# Patient Record
Sex: FEMALE | Race: WHITE | NOT HISPANIC OR LATINO | Employment: OTHER | URBAN - METROPOLITAN AREA
[De-identification: names, ages, dates, MRNs, and addresses within clinical notes are randomized per-mention and may not be internally consistent; named-entity substitution may affect disease eponyms.]

---

## 2017-06-29 RX ORDER — LISINOPRIL 40 MG/1
40 TABLET ORAL NIGHTLY
Qty: 90 TABLET | Refills: 1 | Status: SHIPPED | OUTPATIENT
Start: 2017-06-29 | End: 2018-02-02 | Stop reason: SDUPTHER

## 2017-08-01 ENCOUNTER — OFFICE VISIT (OUTPATIENT)
Dept: CARDIOLOGY | Facility: CLINIC | Age: 82
End: 2017-08-01
Payer: MEDICARE

## 2017-08-01 ENCOUNTER — TELEPHONE (OUTPATIENT)
Dept: PHARMACY | Facility: CLINIC | Age: 82
End: 2017-08-01

## 2017-08-01 ENCOUNTER — LAB VISIT (OUTPATIENT)
Dept: LAB | Facility: HOSPITAL | Age: 82
End: 2017-08-01
Attending: INTERNAL MEDICINE
Payer: MEDICARE

## 2017-08-01 VITALS
SYSTOLIC BLOOD PRESSURE: 156 MMHG | BODY MASS INDEX: 35.9 KG/M2 | DIASTOLIC BLOOD PRESSURE: 64 MMHG | WEIGHT: 210.31 LBS | HEIGHT: 64 IN | HEART RATE: 68 BPM

## 2017-08-01 DIAGNOSIS — E78.2 MIXED HYPERLIPIDEMIA: ICD-10-CM

## 2017-08-01 DIAGNOSIS — I11.9 HYPERTENSIVE HEART DISEASE WITHOUT HEART FAILURE: ICD-10-CM

## 2017-08-01 DIAGNOSIS — I25.118 CORONARY ARTERY DISEASE OF NATIVE ARTERY OF NATIVE HEART WITH STABLE ANGINA PECTORIS: ICD-10-CM

## 2017-08-01 DIAGNOSIS — I25.118 CORONARY ARTERY DISEASE OF NATIVE ARTERY OF NATIVE HEART WITH STABLE ANGINA PECTORIS: Primary | ICD-10-CM

## 2017-08-01 LAB
ALBUMIN SERPL BCP-MCNC: 4.3 G/DL
ALP SERPL-CCNC: 48 U/L
ALT SERPL W/O P-5'-P-CCNC: 17 U/L
ANION GAP SERPL CALC-SCNC: 11 MMOL/L
AST SERPL-CCNC: 18 U/L
BILIRUB SERPL-MCNC: 0.3 MG/DL
BUN SERPL-MCNC: 20 MG/DL
CALCIUM SERPL-MCNC: 10.5 MG/DL
CHLORIDE SERPL-SCNC: 103 MMOL/L
CO2 SERPL-SCNC: 23 MMOL/L
CREAT SERPL-MCNC: 1 MG/DL
EST. GFR  (AFRICAN AMERICAN): >60 ML/MIN/1.73 M^2
EST. GFR  (NON AFRICAN AMERICAN): 52 ML/MIN/1.73 M^2
GLUCOSE SERPL-MCNC: 115 MG/DL
POTASSIUM SERPL-SCNC: 4.6 MMOL/L
PROT SERPL-MCNC: 7.2 G/DL
SODIUM SERPL-SCNC: 137 MMOL/L

## 2017-08-01 PROCEDURE — 1159F MED LIST DOCD IN RCRD: CPT | Mod: S$GLB,,, | Performed by: INTERNAL MEDICINE

## 2017-08-01 PROCEDURE — 99214 OFFICE O/P EST MOD 30 MIN: CPT | Mod: S$GLB,,, | Performed by: INTERNAL MEDICINE

## 2017-08-01 PROCEDURE — 99999 PR PBB SHADOW E&M-EST. PATIENT-LVL III: CPT | Mod: PBBFAC,,, | Performed by: INTERNAL MEDICINE

## 2017-08-01 PROCEDURE — 80053 COMPREHEN METABOLIC PANEL: CPT | Mod: PO

## 2017-08-01 PROCEDURE — 36415 COLL VENOUS BLD VENIPUNCTURE: CPT | Mod: PO

## 2017-08-01 PROCEDURE — 1126F AMNT PAIN NOTED NONE PRSNT: CPT | Mod: S$GLB,,, | Performed by: INTERNAL MEDICINE

## 2017-08-01 PROCEDURE — 99499 UNLISTED E&M SERVICE: CPT | Mod: S$GLB,,, | Performed by: INTERNAL MEDICINE

## 2017-08-01 RX ORDER — RANOLAZINE 500 MG/1
500 TABLET, EXTENDED RELEASE ORAL NIGHTLY
Qty: 60 TABLET | Refills: 3 | Status: SHIPPED | OUTPATIENT
Start: 2017-08-01 | End: 2017-08-08 | Stop reason: SDUPTHER

## 2017-08-01 RX ORDER — PRAVASTATIN SODIUM 20 MG/1
10 TABLET ORAL DAILY
COMMUNITY
End: 2018-03-19

## 2017-08-01 NOTE — PROGRESS NOTES
Subjective:    Patient ID:  Yisel العلي is a 83 y.o. female who presents for Hypertension; Coronary Artery Disease; Hyperlipidemia; and Valvular Heart Disease      HPI  Pt was followed at [facility], new to this clinic, doing ok, see ros  Past Medical History:   Diagnosis Date    Anticoagulant long-term use     Arthritis     CAD (coronary artery disease)     Cancer     skin    Cataract     CKD (chronic kidney disease), stage III     HEARING LOSS     Hematoma complicating a procedure 1213    ant abd wall    [place] (hard of hearing)     BILAT AIDS    Hyperlipidemia     Hypertension     Meniere disease     Pneumonia     Retinal detachment     not sure which eye    Vertigo     Wears glasses      Past Surgical History:   Procedure Laterality Date    carotid surgery      left endarterectomy    CATARACT EXTRACTION      ou    CORONARY STENT PLACEMENT      x1    HYSTERECTOMY      PARTIAL NEPHRECTOMY  1013    benign left kidney neoplasm    RETINAL DETACHMENT SURGERY       Family History   Problem Relation Age of Onset    Cancer Father      ?    Heart failure Father     Amblyopia Neg Hx     Blindness Neg Hx     Cataracts Neg Hx     Diabetes Neg Hx     Glaucoma Neg Hx     Hypertension Neg Hx     Macular degeneration Neg Hx     Retinal detachment Neg Hx     Strabismus Neg Hx     Stroke Neg Hx     Thyroid disease Neg Hx      Social History     Social History    Marital status:      Spouse name: N/A    Number of children: N/A    Years of education: N/A     Social History Main Topics    Smoking status: Former Smoker    Smokeless tobacco: Never Used    Alcohol use Yes      Comment: occasional    Drug use: No    Sexual activity: Not Asked     Other Topics Concern    None     Social History Narrative    None       Review of patient's allergies indicates:   Allergen Reactions    Codeine Other (See Comments)     dosent remember reaction;maybe nausea       Current Outpatient Prescriptions:     " aspirin (ECOTRIN) 81 MG EC tablet, Take 81 mg by mouth once daily., Disp: , Rfl:     diazePAM (VALIUM) 5 MG tablet, TAKE 1 TABLET BY MOUTH EACH DAY AS NEEDED FOR ANXIETY (MAY CAUSE DROWSINESS!) "THANK YOU", Disp: 30 tablet, Rfl: 1    lisinopril (PRINIVIL,ZESTRIL) 40 MG tablet, Take 1 tablet (40 mg total) by mouth nightly., Disp: 90 tablet, Rfl: 1    nitroGLYCERIN (NITRODUR) 0.1 mg/hr, 1 patch once daily. , Disp: , Rfl:     pravastatin (PRAVACHOL) 20 MG tablet, Take 10 mg by mouth once daily., Disp: , Rfl:     ranolazine (RANEXA) 500 MG Tb12, Take 1 tablet (500 mg total) by mouth nightly., Disp: 60 tablet, Rfl: 3    Review of Systems   Constitution: Negative for chills, diaphoresis, weakness, malaise/fatigue and night sweats.   HENT: Negative for congestion, hearing loss and nosebleeds.    Eyes: Negative for blurred vision, discharge, double vision and visual disturbance.   Cardiovascular: Negative for chest pain (rare angina), claudication, cyanosis, dyspnea on exertion (minimal), irregular heartbeat, leg swelling, near-syncope, orthopnea, palpitations, paroxysmal nocturnal dyspnea and syncope.   Respiratory: Negative for cough, hemoptysis, shortness of breath, sleep disturbances due to breathing, sputum production and wheezing.    Endocrine: Negative for cold intolerance, heat intolerance and polyuria.   Hematologic/Lymphatic: Negative for adenopathy. Does not bruise/bleed easily.   Skin: Negative for color change, itching and nail changes.   Musculoskeletal: Positive for arthritis and back pain. Negative for falls. Muscle cramps: some.   Gastrointestinal: Negative for bloating, abdominal pain, change in bowel habit, dysphagia, heartburn, hematemesis, jaundice, melena and vomiting.   Genitourinary: Negative for dysuria, flank pain and frequency.   Neurological: Negative for brief paralysis, difficulty with concentration, disturbances in coordination, dizziness, focal weakness, light-headedness, loss of " "balance, numbness, paresthesias, seizures, sensory change and tremors.   Psychiatric/Behavioral: Negative for altered mental status, depression, memory loss and substance abuse. The patient is not nervous/anxious.    Allergic/Immunologic: Negative for persistent infections.        Objective:      Vitals:    08/01/17 1309   BP: (!) 156/64   Pulse: 68   Weight: 95.4 kg (210 lb 5.1 oz)   Height: 5' 4" (1.626 m)   PainSc: 0-No pain     Body mass index is 36.1 kg/m².    Physical Exam   Constitutional: She is oriented to person, place, and time. She appears well-developed and well-nourished. She is active.   obese   HENT:   Head: Normocephalic and atraumatic.   Mouth/Throat: Oropharynx is clear and moist and mucous membranes are normal.   Eyes: Conjunctivae and EOM are normal. Pupils are equal, round, and reactive to light.   Neck: Trachea normal and normal range of motion. Neck supple. Normal carotid pulses, no hepatojugular reflux and no JVD present. Carotid bruit is not present. No tracheal deviation, no edema and no erythema present. No thyromegaly present.   Cardiovascular: Normal rate and regular rhythm.   No extrasystoles are present. PMI is not displaced.  Exam reveals no gallop and no friction rub.    Murmur heard.   Systolic murmur is present with a grade of 1/6  at the lower left sternal border  Pulses:       Carotid pulses are 2+ on the right side, and 2+ on the left side.       Radial pulses are 2+ on the right side, and 2+ on the left side.        Femoral pulses are 2+ on the right side, and 2+ on the left side.       Posterior tibial pulses are 2+ on the right side, and 2+ on the left side.   Pulmonary/Chest: Effort normal and breath sounds normal. No tachypnea and no bradypnea. She has no wheezes. She has no rales. She exhibits no tenderness.   Abdominal: Soft. Bowel sounds are normal. She exhibits no distension and no mass. There is no hepatosplenomegaly. There is no tenderness. There is no guarding and " no CVA tenderness.   Musculoskeletal: Normal range of motion. She exhibits no edema or tenderness.   Lymphadenopathy:     She has no cervical adenopathy.   Neurological: She is alert and oriented to person, place, and time. She has normal strength. She displays no tremor. No cranial nerve deficit. She exhibits normal muscle tone. Coordination normal.   Skin: Skin is warm and dry. No rash noted. No cyanosis or erythema. No pallor.   Psychiatric: She has a normal mood and affect. Her speech is normal and behavior is normal. Judgment and thought content normal.               ..    Chemistry        Component Value Date/Time     08/01/2017 1404    K 4.6 08/01/2017 1404     08/01/2017 1404    CO2 23 08/01/2017 1404    BUN 20 08/01/2017 1404    CREATININE 1.0 08/01/2017 1404     (H) 08/01/2017 1404        Component Value Date/Time    CALCIUM 10.5 08/01/2017 1404    ALKPHOS 48 (L) 08/01/2017 1404    AST 18 08/01/2017 1404    ALT 17 08/01/2017 1404    BILITOT 0.3 08/01/2017 1404    ESTGFRAFRICA >60 08/01/2017 1404    EGFRNONAA 52 (A) 08/01/2017 1404            ..  Lab Results   Component Value Date    CHOL 221 (H) 01/23/2015     Lab Results   Component Value Date    HDL 41 (L) 01/23/2015     Lab Results   Component Value Date    LDLCALC 108 01/23/2015     Lab Results   Component Value Date    TRIG 361 (H) 01/23/2015     Lab Results   Component Value Date    CHOLHDL 5.4 (H) 01/23/2015     ..  Lab Results   Component Value Date    WBC 6.60 07/27/2016    HGB 12.8 07/27/2016    HCT 37.4 07/27/2016    MCV 93 07/27/2016     (H) 07/27/2016       Test(s) Reviewed  I have reviewed the following in detail:  [] Stress test   [] Angiography   [] Echocardiogram   [] Labs   [x] Other:       Assessment:         ICD-10-CM ICD-9-CM   1. Coronary artery disease of native artery of native heart with stable angina pectoris I25.118 414.01     413.9   2. Hypertensive heart disease without heart failure I11.9 402.90   3.  Mixed hyperlipidemia E78.2 272.2     Problem List Items Addressed This Visit        Cardiac    Coronary artery disease of native artery of native heart with stable angina pectoris - Primary    Relevant Orders    Comprehensive metabolic panel (Completed)    Comprehensive metabolic panel    Hypertensive heart disease without heart failure    Relevant Orders    Comprehensive metabolic panel (Completed)    Comprehensive metabolic panel       Fluids/Electrolytes/Nutrition/GI    Mixed hyperlipidemia    Relevant Orders    Comprehensive metabolic panel (Completed)    Lipid panel    Comprehensive metabolic panel      Other Visit Diagnoses    None.          Plan:     compliance with meds, refill medsd, c/o cost, daily PRAVACHOL PLUS CO-Q-10, LABS TODAY,ALL CV CLINICALLY STABLE, CLASS 1  ANGINA, NO HF, NO TIA, NO CLINICAL ARRHYTHMIA,CONTINUE CURRENT MEDS, EDUCATION, DIET, EXERCISE, WEIGHT LOSS,  RTC IN 6 MO WITH LABS      Coronary artery disease of native artery of native heart with stable angina pectoris  -     Comprehensive metabolic panel; Future; Expected date: 08/01/2017  -     Comprehensive metabolic panel; Future; Expected date: 08/01/2017    Hypertensive heart disease without heart failure  -     Comprehensive metabolic panel; Future; Expected date: 08/01/2017  -     Comprehensive metabolic panel; Future; Expected date: 08/01/2017    Mixed hyperlipidemia  -     Comprehensive metabolic panel; Future; Expected date: 08/01/2017  -     Lipid panel; Future; Expected date: 08/01/2017  -     Comprehensive metabolic panel; Future; Expected date: 08/01/2017    Other orders  -     ranolazine (RANEXA) 500 MG Tb12; Take 1 tablet (500 mg total) by mouth nightly.  Dispense: 60 tablet; Refill: 3    RTC Low level/low impact aerobic exercise 5x's/wk. Heart healthy diet and risk factor modification.    See labs and med orders.    Aerobic exercise 5x's/wk. Heart healthy diet and risk factor modification.    See labs and med  orders.

## 2017-08-01 NOTE — PATIENT INSTRUCTIONS
What Is Angina?  Angina is a warning that your heart muscle is not getting enough oxygen-rich blood and is at risk for damage. Medicines, certain medical procedures, and lifestyle changes can help control angina. Talk with your healthcare provider about how to prevent angina and what to do if you get it.    How does angina feel?  Angina is often described as chest pain, but this can be misleading. Angina is not always painful, and it isnt always felt in the chest. Angina might feel like:  · Discomfort, aching, tightness, or pressure that comes and goes. You may feel this in your chest, back, abdomen, arm, shoulder, neck, or jaw.  · Tiredness that gets worse or you have more tiredness than usual for no clear reason  · Shortness of breath while doing something that used to be easy  · Heartburn, indigestion, nausea, or sweating  Call 911 right away if any of your symptoms lasts for more than a few minutes. Or if they go away and come back. Or if they happen at rest and don't go away after taking nitroglycerin. Or if they continue to get worse. You could be having a heart attack (acute myocardial infarction).  When does angina happen?  · Angina usually happens during activity. It can also occur when youre upset or after a large meal. Sometimes angina can happen when the weather is too hot or too cold. All of these things can put more stress on your body and your heart.  · You may have unstable angina if angina starts occurring more often, lasts longer, happens even when you are resting, or causes more discomfort. Its a sign that your heart problem may be getting worse. You need to call your healthcare provider right away.  Date Last Reviewed: 10/1/2016  © 3932-9242 [employer]. [address] [place] [address], [place] [address]. All rights reserved. This information is not intended as a substitute for professional medical care. Always follow your healthcare professional's instructions.        Controlling Your  "Cholesterol  Cholesterol is a waxy substance. It travels in your blood through the blood vessels. When you have high cholesterol, it builds up in the walls of the blood vessels. This makes the vessels narrower. Blood flow decreases. You are then at greater risk for having a heart attack or a stroke.  Good and bad cholesterol  Lipids are fats. Blood is mostly water. Fat and water don't mix. So our bodies need lipoproteins (lipids inside a protein shell) to carry the lipids. The protein shell carries its lipids through the bloodstream. There are two main kinds of lipoproteins:  · LDL (low-density lipoprotein) is known as "bad cholesterol." It mainly carries cholesterol. It delivers this cholesterol to body cells. Excess LDL cholesterol will build up in artery walls. This increases your risk for heart disease and stroke.  · HDL (high-density lipoprotein) is known as "good cholesterol." This protein shell collects excess cholesterol that LDLs have left behind on blood vessel walls. That's why high levels of HDL cholesterol can decrease your risk of heart disease and stroke.  Controlling cholesterol levels  Total cholesterol includes LDL and HDL cholesterol, as well as other fats in the bloodstream. If your total cholesterol is high, follow the steps below to help lower your total cholesterol level:  · Eat less unhealthy fat:  ¨ Cut back on saturated fats and trans (also called hydrogenated) fats by selecting lean cuts of meat, low-fat dairy, and using oils instead of solid fats. Limit baked goods, processed meats, and fried foods. A diet thats high in these fats increases your bad cholesterol. It's not enough to just cut back on foods containing cholesterol.  ¨ Eat about 2 servings of fish per week. Most fish contain omega-3 fatty acids. These help lower blood cholesterol.  ¨ Eat more whole grains and soluble fiber (such as oat bran). These lower overall cholesterol.  · Be active:  ¨ Choose an activity you enjoy. " Walking, swimming, and riding a bike are some good ways to be active.  ¨ Start at a level where you feel comfortable. Increase your time and pace a little each week.  ¨ Work up to 40 minutes of moderate to high intensity physical activity at least 3 to 4 days per week.  ¨ Remember, some activity is better than none.  ¨ If you haven't been exercising regularly, start slowly. Check with your doctor to make sure the exercise plan is right for you.  · Quit smoking. Quitting smoking can improve your lipid levels. It also lowers your risk for heart disease and stroke.  · Weight management. If you are overweight or obese, your health care provider will work with you to lose weight and lower your BMI (body mass index) to a normal or near-normal level. Making diet changes and increasing physical activity can help.  · Take medication as directed. Many people need medication to get their LDL levels to a safe level. Medication to lower cholesterol levels is effective and safe. (But taking medication is not a substitute for exercise or watching your diet!) Your doctor can tell you whether you might benefit from a cholesterol-lowering medication.  Date Last Reviewed: 5/11/2015  © 6968-8229 [employer]. [address] [place] [address], [place] [id number]. All rights reserved. This information is not intended as a substitute for professional medical care. Always follow your healthcare professional's instructions.        Established High Blood Pressure    High blood pressure (hypertension) is a chronic disease. Often health care providers dont know what causes it. But it can be caused by certain health conditions and medicines.  If you have high blood pressure, you may not have any symptoms. If you do have symptoms, they may include headache, dizziness, changes in your vision, chest pain, and shortness of breath. But even without symptoms, high blood pressure thats not treated raises your risk for heart attack and stroke. High  blood pressure is a serious health risk and shouldnt be ignored.  A blood pressure reading is made up of two numbers: a higher number over a lower number. The top number is the systolic pressure. The bottom number is the diastolic pressure. A normal blood pressure is less than 120 over less than 80.  High blood pressure is when either the top number is 140 or higher, or the bottom number is 90 or higher. This must be the result when taking your blood pressure a number of times. The blood pressures between normal and high are called prehypertension.  Home care  If you have high blood pressure, you should do what is listed below to lower your blood pressure. If you are taking medicines for high blood pressure, these methods may reduce or end your need for medicines in the future.  · Begin a weight-loss program if you are overweight.  · Cut back on how much salt you get in your diet. Heres how to do this:  ¨ Dont eat foods that have a lot of salt. These include olives, pickles, smoked meats, and salted potato chips.  ¨ Dont add salt to your food at the table.  ¨ Use only small amounts of salt when cooking.  · Begin an exercise program. Talk with your health care provider about the type of exercise program that would be best for you. It doesn't have to be hard. Even brisk walking for 20 minutes 3 times a week is a good form of exercise.  · Dont take medicines that have heart stimulants. This includes many cold and sinus decongestant pills and sprays, as well as diet pills. Check the warnings about hypertension on the label. Stimulants such as amphetamine or cocaine could be lethal for someone with high blood pressure. Never take these.  · Limit how much caffeine you get in your diet. Switch to caffeine-free products.  · Stop smoking. If you are a long-time smoker, this can be hard. Enroll in a stop-smoking program to make it more likely that you will quit for good.  · Learn how to handle stress. This is an  important part of any program to lower blood pressure. Learn about relaxation methods like meditation, yoga, or biofeedback.  · If your provider prescribed medicines, take them exactly as directed. Missing doses may cause your blood pressure get out of control.  · Consider buying an automatic blood pressure machine. You can get one of these at most pharmacies. Use this to watch your blood pressure at home. Give the results to your provider.  Follow-up care  You will need to make regular visits to your health care provider. This is to check your blood pressure and to make changes to your medicines. Make a follow-up appointment as directed.  When to seek medical advice  Call your health care provider right away if any of these occur:  · Chest pain or shortness of breath  · Severe headache  · Throbbing or rushing sound in the ears  · Nosebleed  · Sudden severe pain in your belly (abdomen)  · Extreme drowsiness, confusion, or fainting  · Dizziness or dizziness with a spinning sensation (vertigo)  · Weakness of an arm or leg or one side of the face  · You have problems speaking or seeing   Date Last Reviewed: 11/25/2014  © 4795-4807 [employer]. [address] [place] [address]. All rights reserved. This information is not intended as a substitute for professional medical care. Always follow your healthcare professional's instructions.        Weight Management: Getting Started  Healthy bodies come in all shapes and sizes. Not all bodies are made to be thin. For some people, a healthy weight is higher than the average weight listed on weight charts. Your healthcare provider can help you decide on a healthy weight for you.    Reasons to lose weight  Losing weight can help with some health problems, such as high blood pressure, heart disease, diabetes, sleep apnea, and arthritis. You may also feel more energy.  Set your long-term goal  Your goal doesn't even have to be a specific weight. You may  decide on a fitness goal (such as being able to walk 10 miles a week), or a health goal (such as lowering your blood pressure). Choose a goal that is measurable and reasonable, so you know when you've reached it. A goal of reaching a BMI of less than 25 is not always reasonable (or possible).   Make an action plan  Habits dont change overnight. Setting your goals too high can leave you feeling discouraged if you cant reach them. Be realistic. Choose one or two small changes you can make now. Set an action plan for how you are going to make these changes. When you can stick to this plan, keep making a few more small changes. Taking small steps will help you stay on the path to success.  Track your progress  Write down your goals. Then, keep a daily record of your progress. Write down what you eat and how active you are. This record lets you look back on how much youve done. It may also help when youre feeling frustrated. Reward yourself for success. Even if you dont reach every goal, give yourself credit for what you do get done.  Get support  Encouragement from others can help make losing weight easier. Ask your family members and friends for support. They may even want to join you. Also look to your healthcare provider, registered dietitian, and  for help. Your local hospital can give you more information about nutrition, exercise, and weight loss.  Date Last Reviewed: 1/31/2016  © 5603-7395 [employer]. [address] [place] [address], [place] [address]. All rights reserved. This information is not intended as a substitute for professional medical care. Always follow your healthcare professional's instructions.

## 2017-08-02 NOTE — PROGRESS NOTES
Patient, Yisel العلي (MRN #[id number]), presented with a recorded BMI of 36.1 kg/m^2 and a documented comorbidity(s):  - Hypertension  - Hyperlipidemia  to which the severe obesity is a contributing factor. This is consistent with the definition of severe obesity (BMI 35.0-35.9) with comorbidity (ICD-10 E66.01, Z68.35). The patient's severe obesity was monitored, evaluated, addressed and/or treated. This addendum to the medical record is made on 08/02/2017.

## 2017-08-08 RX ORDER — RANOLAZINE 500 MG/1
500 TABLET, EXTENDED RELEASE ORAL NIGHTLY
Qty: 60 TABLET | Refills: 3 | Status: SHIPPED | OUTPATIENT
Start: 2017-08-08 | End: 2018-11-05

## 2017-08-08 NOTE — TELEPHONE ENCOUNTER
----- Message from Cecily Ladd sent at 8/8/2017  3:38 PM CDT -----  Contact: [phone]  Patient is requesting a call back from the nurse stated she's requesting a generic prescription renaxa.    Please call the patient upon request at phone number [phone].

## 2017-10-24 ENCOUNTER — PATIENT MESSAGE (OUTPATIENT)
Dept: CARDIOLOGY | Facility: CLINIC | Age: 82
End: 2017-10-24

## 2017-10-24 ENCOUNTER — TELEPHONE (OUTPATIENT)
Dept: CARDIOLOGY | Facility: CLINIC | Age: 82
End: 2017-10-24

## 2017-10-24 NOTE — TELEPHONE ENCOUNTER
----- Message from Flora Willett sent at 10/24/2017  4:11 PM CDT -----  Contact: Patient  Patient states that she would like to talk to you and can you please call her back at [phone].  Thank you

## 2017-10-25 ENCOUNTER — TELEPHONE (OUTPATIENT)
Dept: CARDIOLOGY | Facility: CLINIC | Age: 82
End: 2017-10-25

## 2017-10-25 ENCOUNTER — OFFICE VISIT (OUTPATIENT)
Dept: FAMILY MEDICINE | Facility: CLINIC | Age: 82
End: 2017-10-25
Payer: MEDICARE

## 2017-10-25 VITALS
HEIGHT: 64 IN | TEMPERATURE: 97 F | WEIGHT: 202.38 LBS | OXYGEN SATURATION: 96 % | SYSTOLIC BLOOD PRESSURE: 130 MMHG | BODY MASS INDEX: 34.55 KG/M2 | DIASTOLIC BLOOD PRESSURE: 74 MMHG | HEART RATE: 72 BPM | RESPIRATION RATE: 16 BRPM

## 2017-10-25 DIAGNOSIS — F41.9 ANXIETY: ICD-10-CM

## 2017-10-25 DIAGNOSIS — F43.9 STRESS: ICD-10-CM

## 2017-10-25 DIAGNOSIS — J30.9 ALLERGIC RHINITIS, UNSPECIFIED CHRONICITY, UNSPECIFIED SEASONALITY, UNSPECIFIED TRIGGER: Primary | ICD-10-CM

## 2017-10-25 PROCEDURE — 99999 PR PBB SHADOW E&M-EST. PATIENT-LVL III: CPT | Mod: PBBFAC,,, | Performed by: INTERNAL MEDICINE

## 2017-10-25 PROCEDURE — 99214 OFFICE O/P EST MOD 30 MIN: CPT | Mod: S$GLB,,, | Performed by: INTERNAL MEDICINE

## 2017-10-25 RX ORDER — LORAZEPAM 0.5 MG/1
TABLET ORAL
Qty: 30 TABLET | Refills: 0 | Status: SHIPPED | OUTPATIENT
Start: 2017-10-25 | End: 2018-03-19

## 2017-10-25 RX ORDER — TRIAMCINOLONE ACETONIDE 55 UG/1
2 SPRAY, METERED NASAL DAILY PRN
Qty: 16.9 G | Refills: 0 | Status: SHIPPED | OUTPATIENT
Start: 2017-10-25 | End: 2018-03-19

## 2017-10-25 RX ORDER — TRIAMCINOLONE ACETONIDE 55 UG/1
2 SPRAY, METERED NASAL DAILY PRN
Qty: 16.9 G | Refills: 0 | Status: SHIPPED | OUTPATIENT
Start: 2017-10-25 | End: 2017-10-25 | Stop reason: SDUPTHER

## 2017-10-25 RX ORDER — LORAZEPAM 0.5 MG/1
TABLET ORAL
Qty: 30 TABLET | Refills: 0 | Status: SHIPPED | OUTPATIENT
Start: 2017-10-25 | End: 2017-10-25 | Stop reason: SDUPTHER

## 2017-10-25 NOTE — TELEPHONE ENCOUNTER
----- Message from Cristobal MAJANO Lisseth sent at 10/25/2017 12:48 PM CDT -----  Contact: same  Patient called in and stated her  passed away and would like to see if Dr. Celeste could call her in a Rx for her anxiety that she is having.  ( Valium 5 mg) Patient would like a call back number is [phone] cell.      For today only : [facility] [phone] phone     Patient is going back home tomorrow and would need Rx called in to the below:      [id number] [place] - [address]  [address] [place] [address]  Phone: [phone] Fax: [phone]

## 2017-10-25 NOTE — TELEPHONE ENCOUNTER
----- Message from Ellen Garrett sent at 10/25/2017  8:49 AM CDT -----  Contact: [phone]  Patient requesting to speak with nurse (only information given)please call patient back at [phone].

## 2017-10-25 NOTE — PROGRESS NOTES
Subjective:       Patient ID: Yisel العلي is a 83 y.o. female.    Chief Complaint: Establish Care; Anxiety; and Insomnia  -est care---------here with daughter------------who she has been staying with in ------lives in [facility].       passed 2 weeks ago and she is having stress/anxiety,/insomnia.          ---------  HPI  Review of Systems   Constitutional: Negative for activity change, appetite change, chills, diaphoresis, fatigue, fever and unexpected weight change.   HENT: Positive for postnasal drip. Negative for congestion, drooling, ear discharge, ear pain, facial swelling, hearing loss, mouth sores, nosebleeds, rhinorrhea, sinus pressure, sneezing, sore throat, tinnitus, trouble swallowing and voice change.    Eyes: Negative for photophobia and redness.   Respiratory: Negative for apnea, cough, choking, chest tightness, shortness of breath and wheezing.    Cardiovascular: Negative for chest pain, palpitations and leg swelling.   Gastrointestinal: Negative for abdominal distention, abdominal pain, blood in stool, constipation, diarrhea, nausea and vomiting.   Endocrine: Negative for cold intolerance, heat intolerance, polydipsia, polyphagia and polyuria.   Genitourinary: Negative for decreased urine volume, difficulty urinating, dysuria, flank pain, frequency, genital sores, hematuria and urgency.   Musculoskeletal: Negative for arthralgias, back pain, gait problem, joint swelling, myalgias, neck pain and neck stiffness.   Skin: Negative for color change, pallor, rash and wound.   Allergic/Immunologic: Negative for food allergies and immunocompromised state.   Neurological: Negative for dizziness, tremors, seizures, syncope, speech difficulty, weakness, light-headedness, numbness and headaches.   Hematological: Negative for adenopathy. Does not bruise/bleed easily.   Psychiatric/Behavioral: Positive for sleep disturbance. Negative for agitation, behavioral problems, confusion, decreased concentration,  dysphoric mood, hallucinations, self-injury and suicidal ideas. The patient is nervous/anxious. The patient is not hyperactive.    All other systems reviewed and are negative.      Objective:      Physical Exam   Constitutional: She is oriented to person, place, and time. She appears well-developed and well-nourished. No distress.   HENT:   Head: Normocephalic and atraumatic.   Neck: Normal range of motion. Neck supple. Carotid bruit is not present.   Cardiovascular: Normal rate, regular rhythm, normal heart sounds and intact distal pulses.    Pulmonary/Chest: Effort normal and breath sounds normal. No respiratory distress. She has no wheezes. She has no rales. She exhibits no tenderness.   Abdominal: Soft. Bowel sounds are normal. She exhibits no distension. There is no tenderness. There is no rebound and no guarding.   Musculoskeletal: Normal range of motion. She exhibits no edema or tenderness.   Neurological: She is alert and oriented to person, place, and time. Coordination normal.   Skin: Skin is warm and dry. No rash noted. She is not diaphoretic. No erythema. No pallor.   Psychiatric: She has a normal mood and affect. Her behavior is normal. Judgment and thought content normal.   Nursing note and vitals reviewed.      Assessment:       1. Allergic rhinitis, unspecified chronicity, unspecified seasonality, unspecified trigger    2. Anxiety    3. Stress        Plan:         notes/labs reviewed.          Continue meds.              Trial ativan 0.5 mg--1/2 to 1 bid prn.        Call for problems.

## 2017-10-26 ENCOUNTER — TELEPHONE (OUTPATIENT)
Dept: CARDIOLOGY | Facility: CLINIC | Age: 82
End: 2017-10-26

## 2017-10-26 NOTE — TELEPHONE ENCOUNTER
30 day supply of Valium 5mg PO daily PRN anxiety, called into [facility] in [place], pt verbalizes understanding

## 2017-12-04 ENCOUNTER — TELEPHONE (OUTPATIENT)
Dept: CARDIOLOGY | Facility: CLINIC | Age: 82
End: 2017-12-04

## 2017-12-04 NOTE — TELEPHONE ENCOUNTER
----- Message from Mayte Sanchez sent at 12/4/2017 10:22 AM CST -----  Contact: self  Patient called regarding her lab orders would like mailed to her before her appt in January. Please contact [phone] (home)

## 2018-01-18 ENCOUNTER — TELEPHONE (OUTPATIENT)
Dept: CARDIOLOGY | Facility: CLINIC | Age: 83
End: 2018-01-18

## 2018-01-18 NOTE — TELEPHONE ENCOUNTER
----- Message from Domonique Liu sent at 1/16/2018  4:01 PM CST -----  Contact: patient  Patient calling to request lab work orders to be mailed to her and she will have them done before her appt. Please advise.  Call back   Thanks!

## 2018-02-02 RX ORDER — LISINOPRIL 40 MG/1
40 TABLET ORAL NIGHTLY
Qty: 90 TABLET | Refills: 1 | Status: SHIPPED | OUTPATIENT
Start: 2018-02-02 | End: 2018-03-19 | Stop reason: SDUPTHER

## 2018-02-02 NOTE — TELEPHONE ENCOUNTER
----- Message from Adwoa Argueta sent at 2/2/2018 10:37 AM CST -----  Contact: self  Patient [phone]  has been trying to reach Dr Celeste's office for a refill on her blood pressure medication but has not been able to reach anyone for two weeks/she has been out of her Lisinopril 40mg for two weeks and her blood pressure is running high - per patient/please call prescription to [facility] [phone] as soon as possible today and advise patient when sent to pharmacy/if patient is not able to answer please leave her a voicemail

## 2018-02-12 ENCOUNTER — PATIENT MESSAGE (OUTPATIENT)
Dept: CARDIOLOGY | Facility: CLINIC | Age: 83
End: 2018-02-12

## 2018-02-19 ENCOUNTER — TELEPHONE (OUTPATIENT)
Dept: CARDIOLOGY | Facility: CLINIC | Age: 83
End: 2018-02-19

## 2018-02-19 NOTE — TELEPHONE ENCOUNTER
----- Message from Mayte Sanchez sent at 2/19/2018  4:27 PM CST -----  Contact: self  Patient called regarding orders needed for labs before her appt. Please mail to her home. Please contact [phone] (home)

## 2018-03-19 ENCOUNTER — OFFICE VISIT (OUTPATIENT)
Dept: CARDIOLOGY | Facility: CLINIC | Age: 83
End: 2018-03-19
Payer: MEDICARE

## 2018-03-19 VITALS — BODY MASS INDEX: 32.85 KG/M2 | WEIGHT: 192.44 LBS | HEIGHT: 64 IN

## 2018-03-19 DIAGNOSIS — E87.1 HYPONATREMIA: ICD-10-CM

## 2018-03-19 DIAGNOSIS — E78.2 MIXED HYPERLIPIDEMIA: ICD-10-CM

## 2018-03-19 DIAGNOSIS — I10 HTN (HYPERTENSION), BENIGN: ICD-10-CM

## 2018-03-19 DIAGNOSIS — E66.9 OBESITY, CLASS I, BMI 30-34.9: ICD-10-CM

## 2018-03-19 DIAGNOSIS — I25.118 CORONARY ARTERY DISEASE OF NATIVE ARTERY OF NATIVE HEART WITH STABLE ANGINA PECTORIS: Primary | ICD-10-CM

## 2018-03-19 PROBLEM — E66.811 OBESITY, CLASS I, BMI 30-34.9: Status: ACTIVE | Noted: 2018-03-19

## 2018-03-19 PROCEDURE — 99214 OFFICE O/P EST MOD 30 MIN: CPT | Mod: S$GLB,,, | Performed by: INTERNAL MEDICINE

## 2018-03-19 PROCEDURE — 99999 PR PBB SHADOW E&M-EST. PATIENT-LVL II: CPT | Mod: PBBFAC,,, | Performed by: INTERNAL MEDICINE

## 2018-03-19 RX ORDER — LISINOPRIL 40 MG/1
40 TABLET ORAL NIGHTLY
Qty: 90 TABLET | Refills: 1 | Status: SHIPPED | OUTPATIENT
Start: 2018-03-19 | End: 2018-07-21 | Stop reason: SDUPTHER

## 2018-03-19 RX ORDER — PRAVASTATIN SODIUM 40 MG/1
40 TABLET ORAL DAILY
Qty: 90 TABLET | Refills: 1 | Status: SHIPPED | OUTPATIENT
Start: 2018-03-19 | End: 2018-11-07 | Stop reason: SDUPTHER

## 2018-03-19 NOTE — PROGRESS NOTES
Subjective:    Patient ID:  Yisel العلي is a 84 y.o. female who presents for Coronary Artery Disease (Labs); Hypertension; and Hyperlipidemia        HPI  DISCUSSED LABS AND GOALS, , NOT TAKING MEDS, , CR 1.06, NO CP OR OCC / SOB, NOT ACTIVE, SEE ROS    Past Medical History:   Diagnosis Date    Anticoagulant long-term use     Arthritis     CAD (coronary artery disease)     Cancer     skin    Cataract     CKD (chronic kidney disease), stage III     HEARING LOSS     Hematoma complicating a procedure 1213    ant abd wall    [place] (hard of hearing)     BILAT AIDS    Hyperlipidemia     Hypertension     Meniere disease     Pneumonia     Retinal detachment     not sure which eye    Vertigo     Wears glasses      Past Surgical History:   Procedure Laterality Date    carotid surgery      left endarterectomy    CATARACT EXTRACTION      ou    CORONARY STENT PLACEMENT      x1    HYSTERECTOMY      PARTIAL NEPHRECTOMY  1013    benign left kidney neoplasm    RETINAL DETACHMENT SURGERY       Family History   Problem Relation Age of Onset    Cancer Father      ?    Heart failure Father     Amblyopia Neg Hx     Blindness Neg Hx     Cataracts Neg Hx     Diabetes Neg Hx     Glaucoma Neg Hx     Hypertension Neg Hx     Macular degeneration Neg Hx     Retinal detachment Neg Hx     Strabismus Neg Hx     Stroke Neg Hx     Thyroid disease Neg Hx      Social History     Social History    Marital status:      Spouse name: N/A    Number of children: N/A    Years of education: N/A     Social History Main Topics    Smoking status: Former Smoker    Smokeless tobacco: Never Used    Alcohol use Yes      Comment: occasional    Drug use: No    Sexual activity: Not on file     Other Topics Concern    Not on file     Social History Narrative    No narrative on file       Review of patient's allergies indicates:   Allergen Reactions    Codeine Other (See Comments)     dosent remember  reaction;maybe nausea       Current Outpatient Prescriptions:     aspirin (ECOTRIN) 81 MG EC tablet, Take 81 mg by mouth once daily., Disp: , Rfl:     FLUZONE HIGH-DOSE 2017-18, PF, 180 mcg/0.5 mL vaccine, ADM 0.5ML IM UTD, Disp: , Rfl: 0    lisinopril (PRINIVIL,ZESTRIL) 40 MG tablet, Take 1 tablet (40 mg total) by mouth nightly., Disp: 90 tablet, Rfl: 1    nitroGLYCERIN (NITRODUR) 0.1 mg/hr, 1 patch once daily. , Disp: , Rfl:     ranolazine (RANEXA) 500 MG Tb12, Take 1 tablet (500 mg total) by mouth nightly., Disp: 60 tablet, Rfl: 3    pravastatin (PRAVACHOL) 40 MG tablet, Take 1 tablet (40 mg total) by mouth once daily., Disp: 90 tablet, Rfl: 1    Review of Systems   Constitution: Negative for chills, diaphoresis, weakness, malaise/fatigue and night sweats.   HENT: Negative for congestion, hearing loss and nosebleeds.    Eyes: Negative for blurred vision, discharge, double vision and visual disturbance.   Cardiovascular: Negative for chest pain (OCC ANGINA, NITRATES), claudication, cyanosis, dyspnea on exertion (minimal), irregular heartbeat, leg swelling, near-syncope, orthopnea, palpitations, paroxysmal nocturnal dyspnea and syncope.   Respiratory: Negative for cough, hemoptysis, shortness of breath and wheezing.    Endocrine: Negative for cold intolerance, heat intolerance and polyuria.   Hematologic/Lymphatic: Negative for adenopathy. Does not bruise/bleed easily.   Skin: Negative for color change, itching and rash.   Musculoskeletal: Negative for back pain (CHRONIC, MILD) and falls. Muscle cramps: some.   Gastrointestinal: Negative for bloating, abdominal pain, change in bowel habit, dysphagia, heartburn, hematemesis, jaundice, melena and vomiting.   Genitourinary: Negative for dysuria, flank pain and frequency.   Neurological: Negative for brief paralysis, dizziness, focal weakness, light-headedness, loss of balance, numbness, paresthesias, seizures, sensory change and tremors.  "  Psychiatric/Behavioral: Negative for altered mental status, depression and memory loss. The patient is not nervous/anxious.    Allergic/Immunologic: Negative for hives and persistent infections.        Objective:      Vitals:    03/19/18 1333   Weight: 87.3 kg (192 lb 7.4 oz)   Height: 5' 4" (1.626 m)     Body mass index is 33.04 kg/m².    Physical Exam   Constitutional: She is oriented to person, place, and time. She appears well-developed and well-nourished. She is active.   obese   HENT:   Head: Normocephalic and atraumatic.   Mouth/Throat: Oropharynx is clear and moist and mucous membranes are normal.   Eyes: Conjunctivae and EOM are normal. Pupils are equal, round, and reactive to light.   Neck: Trachea normal and normal range of motion. Neck supple. Normal carotid pulses, no hepatojugular reflux and no JVD present. Carotid bruit is not present. No tracheal deviation, no edema and no erythema present. No thyromegaly present.   Cardiovascular: Normal rate and regular rhythm.   No extrasystoles are present. PMI is not displaced.  Exam reveals no gallop and no friction rub.    Murmur heard.   Systolic murmur is present with a grade of 1/6  at the lower left sternal border  Pulses:       Carotid pulses are 2+ on the right side, and 2+ on the left side.       Radial pulses are 2+ on the right side, and 2+ on the left side.        Femoral pulses are 2+ on the right side, and 2+ on the left side.       Posterior tibial pulses are 2+ on the right side, and 2+ on the left side.   Pulmonary/Chest: Effort normal and breath sounds normal. No tachypnea and no bradypnea. She has no wheezes. She has no rales. She exhibits no tenderness.   Abdominal: Soft. Bowel sounds are normal. She exhibits no distension and no mass. There is no hepatosplenomegaly. There is no tenderness. There is no guarding and no CVA tenderness.   Musculoskeletal: Normal range of motion. She exhibits no edema or tenderness.   Lymphadenopathy:     She " has no cervical adenopathy.   Neurological: She is alert and oriented to person, place, and time. She has normal strength. She displays no tremor. No cranial nerve deficit. She exhibits normal muscle tone. Coordination normal.   Skin: Skin is warm and dry. No rash noted. No cyanosis or erythema. No pallor.   Psychiatric: She has a normal mood and affect. Her speech is normal and behavior is normal. Judgment and thought content normal.               ..    Chemistry        Component Value Date/Time     08/01/2017 1404    K 4.6 08/01/2017 1404     08/01/2017 1404    CO2 23 08/01/2017 1404    BUN 20 08/01/2017 1404    CREATININE 1.0 08/01/2017 1404     (H) 08/01/2017 1404        Component Value Date/Time    CALCIUM 10.5 08/01/2017 1404    ALKPHOS 48 (L) 08/01/2017 1404    AST 18 08/01/2017 1404    ALT 17 08/01/2017 1404    BILITOT 0.3 08/01/2017 1404    ESTGFRAFRICA >60 08/01/2017 1404    EGFRNONAA 52 (A) 08/01/2017 1404            ..  Lab Results   Component Value Date    CHOL 221 (H) 01/23/2015     Lab Results   Component Value Date    HDL 41 (L) 01/23/2015     Lab Results   Component Value Date    LDLCALC 108 01/23/2015     Lab Results   Component Value Date    TRIG 361 (H) 01/23/2015     Lab Results   Component Value Date    CHOLHDL 5.4 (H) 01/23/2015     ..  Lab Results   Component Value Date    WBC 6.60 07/27/2016    HGB 12.8 07/27/2016    HCT 37.4 07/27/2016    MCV 93 07/27/2016     (H) 07/27/2016       Test(s) Reviewed  I have reviewed the following in detail:  [] Stress test   [] Angiography   [] Echocardiogram   [x] Labs   [] Other:       Assessment:         ICD-10-CM ICD-9-CM   1. Coronary artery disease of native artery of native heart with stable angina pectoris I25.118 414.01     413.9   2. HTN (hypertension), benign I10 401.1   3. Hyponatremia E87.1 276.1   4. Mixed hyperlipidemia E78.2 272.2   5. Obesity, Class I, BMI 30-34.9 E66.9 278.00     Problem List Items Addressed This  Visit        Cardiac/Vascular    Coronary artery disease of native artery of native heart with stable angina pectoris - Primary    Relevant Orders    Comprehensive metabolic panel    Lipid panel    Mixed hyperlipidemia    Relevant Orders    Comprehensive metabolic panel    Lipid panel    HTN (hypertension), benign    Relevant Orders    Comprehensive metabolic panel       Renal/    Hyponatremia    Relevant Orders    Comprehensive metabolic panel       Endocrine    Obesity, Class I, BMI 30-34.9    Relevant Orders    Comprehensive metabolic panel           Plan:     RE-START PRAVACHOL, DIET,AVOID EXCESS WATER,  ALL CV CLINICALLY STABLE, CLASS 1  ANGINA, NO HF, NO TIA, NO CLINICAL ARRHYTHMIA,CONTINUE CURRENT MEDS, EDUCATION, DIET, EXERCISE, WEIGHT LOSS, F/U IN SLIDEL/ DRIVING DISTANCE      Coronary artery disease of native artery of native heart with stable angina pectoris  -     Comprehensive metabolic panel; Future; Expected date: 03/19/2018  -     Lipid panel; Future; Expected date: 03/19/2018    HTN (hypertension), benign  -     Comprehensive metabolic panel; Future; Expected date: 03/19/2018    Hyponatremia  -     Comprehensive metabolic panel; Future; Expected date: 03/19/2018    Mixed hyperlipidemia  -     Comprehensive metabolic panel; Future; Expected date: 03/19/2018  -     Lipid panel; Future; Expected date: 03/19/2018    Obesity, Class I, BMI 30-34.9  -     Comprehensive metabolic panel; Future; Expected date: 03/19/2018    Other orders  -     pravastatin (PRAVACHOL) 40 MG tablet; Take 1 tablet (40 mg total) by mouth once daily.  Dispense: 90 tablet; Refill: 1  -     lisinopril (PRINIVIL,ZESTRIL) 40 MG tablet; Take 1 tablet (40 mg total) by mouth nightly.  Dispense: 90 tablet; Refill: 1    RTC Low level/low impact aerobic exercise 5x's/wk. Heart healthy diet and risk factor modification.    See labs and med orders.    Aerobic exercise 5x's/wk. Heart healthy diet and risk factor modification.    See labs  and med orders.

## 2018-07-21 RX ORDER — LISINOPRIL 40 MG/1
TABLET ORAL
Qty: 90 TABLET | Refills: 1 | Status: SHIPPED | OUTPATIENT
Start: 2018-07-21 | End: 2019-03-12 | Stop reason: SDUPTHER

## 2018-10-15 ENCOUNTER — TELEPHONE (OUTPATIENT)
Dept: CARDIOLOGY | Facility: CLINIC | Age: 83
End: 2018-10-15

## 2018-10-15 NOTE — TELEPHONE ENCOUNTER
Called to schedule appt with provider. New pt//self referral// former Dr Celeste pt. Appt 11/5/2018

## 2018-10-15 NOTE — TELEPHONE ENCOUNTER
----- Message from Sravan Hughes sent at 10/15/2018 10:08 AM CDT -----  Type:  Patient Requesting Referral    Who Called:  Patient  Does the patient already have the specialty appointment scheduled?:  No  If yes, what is the date of that appointment?:   Referral to What Specialty:  Cardiology  Reason for Referral:  Establish care for [facility].  Patient doesn't want to go to [place]  Does the patient want the referral with a specific physician?:  Dr. Franklin or anyone in [place]  [facility] the [facility]  Patient Requesting a Call Back?:  Yes  Best Call Back Number:  [phone]

## 2018-10-15 NOTE — TELEPHONE ENCOUNTER
Please contact patient to schedule appt. Former patient of Dr. Celeste, wanting to see a provider in [place].

## 2018-10-24 RX ORDER — PRAVASTATIN SODIUM 40 MG/1
40 TABLET ORAL DAILY
Qty: 90 TABLET | Refills: 1 | OUTPATIENT
Start: 2018-10-24 | End: 2019-10-24

## 2018-11-05 ENCOUNTER — OFFICE VISIT (OUTPATIENT)
Dept: CARDIOLOGY | Facility: CLINIC | Age: 83
End: 2018-11-05
Payer: MEDICARE

## 2018-11-05 VITALS
OXYGEN SATURATION: 98 % | WEIGHT: 193.56 LBS | BODY MASS INDEX: 33.05 KG/M2 | HEIGHT: 64 IN | DIASTOLIC BLOOD PRESSURE: 70 MMHG | HEART RATE: 65 BPM | SYSTOLIC BLOOD PRESSURE: 168 MMHG

## 2018-11-05 DIAGNOSIS — E65 ABDOMINAL OBESITY: ICD-10-CM

## 2018-11-05 DIAGNOSIS — Z76.89 ENCOUNTER TO ESTABLISH CARE: Primary | ICD-10-CM

## 2018-11-05 DIAGNOSIS — G89.29 CHRONIC BILATERAL LOW BACK PAIN WITH LEFT-SIDED SCIATICA: ICD-10-CM

## 2018-11-05 DIAGNOSIS — Z91.89 SEDENTARY LIFESTYLE: ICD-10-CM

## 2018-11-05 DIAGNOSIS — R41.3 MEMORY DIFFICULTIES: ICD-10-CM

## 2018-11-05 DIAGNOSIS — Z76.89 ENCOUNTER TO ESTABLISH CARE: ICD-10-CM

## 2018-11-05 DIAGNOSIS — M54.42 CHRONIC BILATERAL LOW BACK PAIN WITH LEFT-SIDED SCIATICA: ICD-10-CM

## 2018-11-05 DIAGNOSIS — I25.118 CORONARY ARTERY DISEASE OF NATIVE ARTERY OF NATIVE HEART WITH STABLE ANGINA PECTORIS: Primary | ICD-10-CM

## 2018-11-05 DIAGNOSIS — I25.10 ATHEROSCLEROSIS OF NATIVE CORONARY ARTERY OF NATIVE HEART WITHOUT ANGINA PECTORIS: ICD-10-CM

## 2018-11-05 DIAGNOSIS — G47.19 EXCESSIVE DAYTIME SLEEPINESS: ICD-10-CM

## 2018-11-05 DIAGNOSIS — I11.9 HYPERTENSIVE HEART DISEASE WITHOUT HEART FAILURE: ICD-10-CM

## 2018-11-05 DIAGNOSIS — E78.2 MIXED HYPERLIPIDEMIA: ICD-10-CM

## 2018-11-05 DIAGNOSIS — R26.89 POOR BALANCE: ICD-10-CM

## 2018-11-05 DIAGNOSIS — Z95.5 STATUS POST CORONARY ARTERY STENT PLACEMENT: ICD-10-CM

## 2018-11-05 PROCEDURE — 3077F SYST BP >= 140 MM HG: CPT | Mod: CPTII,S$GLB,, | Performed by: INTERNAL MEDICINE

## 2018-11-05 PROCEDURE — 1101F PT FALLS ASSESS-DOCD LE1/YR: CPT | Mod: CPTII,S$GLB,, | Performed by: INTERNAL MEDICINE

## 2018-11-05 PROCEDURE — 99999 PR PBB SHADOW E&M-EST. PATIENT-LVL III: CPT | Mod: PBBFAC,,, | Performed by: INTERNAL MEDICINE

## 2018-11-05 PROCEDURE — 93000 ELECTROCARDIOGRAM COMPLETE: CPT | Mod: S$GLB,,, | Performed by: INTERNAL MEDICINE

## 2018-11-05 PROCEDURE — 99215 OFFICE O/P EST HI 40 MIN: CPT | Mod: S$GLB,,, | Performed by: INTERNAL MEDICINE

## 2018-11-05 PROCEDURE — 3078F DIAST BP <80 MM HG: CPT | Mod: CPTII,S$GLB,, | Performed by: INTERNAL MEDICINE

## 2018-11-05 NOTE — PATIENT INSTRUCTIONS

## 2018-11-05 NOTE — PROGRESS NOTES
"Subjective:    Patient ID:  Yisel العلي is a 84 y.o. female who presents for evaluation of Establish Care  Came on own  PCP: none  Gyn: Dr. Carmona in [facility]  Prior cardiologist: Dr. Celeste, last seen in 3/2018  Lives alone, no pet, worry about tripping with poor balance for 5 years.   Daughter, Vandana, RN in [facility], do not have POA   Son, David, in [place] do have POA  Retire teacher, [employer], play competitive bridge twice a week    Patient is a new patient to me.  Difficult historian with memory difficulties.     HPI  Dr. Celeste's note - "84 y.o. female who presents for Coronary Artery Disease (Labs); Hypertension; and Hyperlipidemia   DISCUSSED LABS AND GOALS, , NOT TAKING MEDS, , CR 1.06, NO CP OR OCC / SOB, NOT ACTIVE.   Recall CAD with single stent placed maybe 15+ year ago. Do not recall any cardiac testing. ECG in sinus bradycardia with right axis deviation. Had smoked socially during college. 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 No recent repeat lipid panel. Report compliance with medications with restart of Pravastatin since visit in 3/2018. Not active now except for card playing, stopped walking in the poor for the last 3 weeks due to the water temperature. Was doing about an hour a day. Recall being on Ranexa for a number of years, only take one a day, do not see much benefit. Lot of worries about the not able to walk due to OA with CLBP with left sciatica, also concern of poor balance and falling. Willing to consider going to the gym.    Review of Systems   Constitution: Negative for diaphoresis, fever, weakness, malaise/fatigue, night sweats and weight gain.   HENT: Positive for congestion. Negative for nosebleeds and tinnitus.    Eyes: Positive for redness. Negative for visual disturbance.   Cardiovascular: Positive for chest pain and irregular heartbeat. Negative for claudication, cyanosis, dyspnea on exertion, leg swelling, " "near-syncope, orthopnea, palpitations and paroxysmal nocturnal dyspnea.   Respiratory: Positive for snoring and sputum production. Negative for cough, shortness of breath, sleep disturbances due to breathing and wheezing.         Gunlock score 11   Endocrine: Positive for polyuria. Negative for polydipsia.   Hematologic/Lymphatic: Does not bruise/bleed easily.   Skin: Positive for itching. Negative for color change, flushing, nail changes, poor wound healing and suspicious lesions.   Musculoskeletal: Positive for arthritis, back pain, joint pain, muscle cramps, muscle weakness, myalgias, neck pain and stiffness. Negative for falls, gout and joint swelling.   Gastrointestinal: Positive for bloating, bowel incontinence and constipation. Negative for heartburn, hematemesis, hematochezia, melena and nausea.   Genitourinary: Positive for bladder incontinence, flank pain, frequency, nocturia, pelvic pain and urgency.   Neurological: Positive for excessive daytime sleepiness, dizziness, light-headedness, loss of balance, paresthesias, sensory change and vertigo. Negative for disturbances in coordination, focal weakness, headaches and numbness.   Psychiatric/Behavioral: Positive for memory loss. Negative for depression and substance abuse. The patient does not have insomnia and is not nervous/anxious.         Objective:    Physical Exam   Constitutional: She is oriented to person, place, and time. She appears well-developed and well-nourished.   HENT:   Head: Normocephalic.   Eyes: Conjunctivae and EOM are normal. Pupils are equal, round, and reactive to light.   Neck: Normal range of motion. Neck supple. No JVD present. No thyromegaly present.   Circumference 14"   Cardiovascular: Normal rate, regular rhythm and intact distal pulses. Exam reveals no gallop and no friction rub.   Murmur heard.   Harsh midsystolic murmur is present with a grade of 2/6 at the upper right sternal border radiating to the neck.  Pulses:       " "Carotid pulses are 2+ on the right side, and 2+ on the left side.       Radial pulses are 2+ on the right side, and 2+ on the left side.        Femoral pulses are 2+ on the right side, and 2+ on the left side.       Popliteal pulses are 2+ on the right side, and 2+ on the left side.        Dorsalis pedis pulses are 2+ on the right side, and 2+ on the left side.        Posterior tibial pulses are 2+ on the right side, and 2+ on the left side.   Pulmonary/Chest: Effort normal and breath sounds normal. She has no rales. She exhibits no tenderness.   Abdominal: Soft. Bowel sounds are normal. There is no tenderness.   Waist 46", hip 45"   Musculoskeletal: Normal range of motion. She exhibits no edema.   Lymphadenopathy:     She has no cervical adenopathy.   Neurological: She is alert and oriented to person, place, and time.   Skin: Skin is warm and dry. No rash noted.         Assessment:       1. Coronary artery disease of native artery of native heart with stable angina pectoris    2. Memory difficulties    3. Poor balance, onset 2014    4. Mixed hyperlipidemia, baseline LDL not available    5. Status post coronary artery stent placement    6. Hypertensive heart disease without heart failure    7. Atherosclerosis of native coronary artery of native heart without angina pectoris     8. BMI 33.0-33.9,adult    9. Abdominal obesity    10. Sedentary lifestyle    11. Chronic bilateral low back pain with left-sided sciatica         Plan:       Yisel was seen today for establish care.    Diagnoses and all orders for this visit:    Coronary artery disease of native artery of native heart with stable angina pectoris  -     Echocardiogram stress test with CFD (Cupid Only); Future  -     Lipid panel; Future  -     High sensitivity CRP (Cardiac CRP); Future    Memory difficulties    Poor balance, onset 2014    Mixed hyperlipidemia, baseline LDL not available  -     Lipid panel; Future    Status post coronary artery stent " placement  -     Echocardiogram stress test with CFD (Cupid Only); Future  -     High sensitivity CRP (Cardiac CRP); Future    Hypertensive heart disease without heart failure  -     Echocardiogram stress test with CFD (Cupid Only); Future    Atherosclerosis of native coronary artery of native heart without angina pectoris   -     High sensitivity CRP (Cardiac CRP); Future    BMI 33.0-33.9,adult    Abdominal obesity    Sedentary lifestyle    Chronic bilateral low back pain with left-sided sciatica    Excessive daytime sleepiness    - All medical issues reviewed, continue current Rx.  - CV status stable, continue current Rx, all medications reviewed, patient acknowledge good understanding.  - Instruction for Mediterranean diet and heart healthy exercise given.  - Check home blood pressure, 2 days weekly, do 2 readings within 5 minutes in AM and PM, keep log for review.  - Weigh twice weekly, try to lose 1-2 lbs per week  - Highly recommend 30 minutes of exercise daily, can have Sunday off, with 2-3 sessions of muscle strengthening weekly. A  would be very helpful.  - Highly recommend Yoga, Macho-Chi and or meditation  - Recommend at least biannual cardiovascular evaluation in view of her significant risk factors. Patient's preference  - Phone review / encourage use of [employer]      Patient Active Problem List   Diagnosis    Coronary artery disease of native artery of native heart with stable angina pectoris    Elevated fasting glucose    Hypercalcemia    Mixed hyperlipidemia, baseline LDL not available    HTN (hypertension), benign    Annual physical exam    Rhinitis    Tear of retina without detachment - Right Eye    EPIPHORA    Dry eyes    Pseudophakia - Both Eyes    Unspecified vitamin D deficiency    Anemia    Difficulty walking    Lower extremity pain    Back pain    Nephrolithiasis    Hypertensive heart disease without heart failure    Anxiety    Stress    Hyponatremia     Obesity, Class I, BMI 30-34.9    Memory difficulties    Poor balance, onset 2014    Status post coronary artery stent placement    BMI 33.0-33.9,adult    Abdominal obesity    Sedentary lifestyle    Chronic bilateral low back pain with left-sided sciatica    Excessive daytime sleepiness     Total face-to-face time with the patient was 60 minutes and greater than 50% was spent in counseling and coordination of care. The above assessment and plan have been discussed at length. Referring physician's note reviewed. Labs and procedure over the last 6 months reviewed. Problem List updated. Asked to bring in all active medications / pills bottles with next visit.

## 2018-11-07 DIAGNOSIS — E78.2 MIXED HYPERLIPIDEMIA: Primary | ICD-10-CM

## 2018-11-07 RX ORDER — PRAVASTATIN SODIUM 40 MG/1
40 TABLET ORAL DAILY
Qty: 90 TABLET | Refills: 3 | Status: SHIPPED | OUTPATIENT
Start: 2018-11-07 | End: 2019-05-30

## 2018-11-07 NOTE — TELEPHONE ENCOUNTER
----- Message from Stormy Miranda sent at 11/7/2018  9:52 AM CST -----  Contact: self  Type:  RX Refill Request    Who Called:  self  Refill or New Rx:  new  RX Name and Strength:  pravastatin (PRAVACHOL) 40 MG tablet  How is the patient currently taking it? (ex. 1XDay):  1Xday  Is this a 30 day or 90 day RX:  90  Preferred Pharmacy with phone number:  [facility]  Local or Mail Order:  Mail order  Ordering Provider:  Dr Celeste  [facility] Call Back Number:  [phone]  Additional Information:  Patient needs doctor to call it needs so he will be in the system for her medications. Please call patient when called in. Thanks!

## 2018-11-08 ENCOUNTER — HOSPITAL ENCOUNTER (OUTPATIENT)
Dept: CARDIOLOGY | Facility: HOSPITAL | Age: 83
Discharge: HOME | End: 2018-11-08
Attending: INTERNAL MEDICINE
Payer: MEDICARE

## 2018-11-08 VITALS
HEIGHT: 64 IN | HEART RATE: 58 BPM | SYSTOLIC BLOOD PRESSURE: 147 MMHG | DIASTOLIC BLOOD PRESSURE: 93 MMHG | WEIGHT: 193 LBS | BODY MASS INDEX: 32.95 KG/M2

## 2018-11-08 DIAGNOSIS — I11.9 HYPERTENSIVE HEART DISEASE WITHOUT HEART FAILURE: ICD-10-CM

## 2018-11-08 DIAGNOSIS — I25.118 CORONARY ARTERY DISEASE OF NATIVE ARTERY OF NATIVE HEART WITH STABLE ANGINA PECTORIS: ICD-10-CM

## 2018-11-08 DIAGNOSIS — Z95.5 STATUS POST CORONARY ARTERY STENT PLACEMENT: ICD-10-CM

## 2018-11-08 PROCEDURE — 93351 STRESS TTE COMPLETE: CPT | Mod: 26,,, | Performed by: INTERNAL MEDICINE

## 2018-11-08 PROCEDURE — 93320 DOPPLER ECHO COMPLETE: CPT | Mod: 26,,, | Performed by: INTERNAL MEDICINE

## 2018-11-08 PROCEDURE — 63600175 PHARM REV CODE 636 W HCPCS

## 2018-11-08 PROCEDURE — 93320 DOPPLER ECHO COMPLETE: CPT

## 2018-11-08 PROCEDURE — 93325 DOPPLER ECHO COLOR FLOW MAPG: CPT | Mod: 26,,, | Performed by: INTERNAL MEDICINE

## 2018-11-08 RX ORDER — DOBUTAMINE HYDROCHLORIDE 200 MG/100ML
50 INJECTION INTRAVENOUS ONCE
Status: COMPLETED | OUTPATIENT
Start: 2018-11-08 | End: 2018-11-08

## 2018-11-08 RX ORDER — ATROPINE SULFATE 0.1 MG/ML
INJECTION INTRAVENOUS
Status: DISCONTINUED
Start: 2018-11-08 | End: 2018-11-08 | Stop reason: WASHOUT

## 2018-11-08 RX ORDER — DOBUTAMINE HYDROCHLORIDE 200 MG/100ML
INJECTION INTRAVENOUS
Status: COMPLETED
Start: 2018-11-08 | End: 2018-11-08

## 2018-11-08 RX ADMIN — DOBUTAMINE HYDROCHLORIDE 500000 MCG: 200 INJECTION INTRAVENOUS at 10:11

## 2018-11-09 LAB
AORTIC ROOT ANNULUS: 2.57 CM
AORTIC VALVE CUSP SEPERATION: 1.49 CM
AV MEAN GRADIENT: 6.31 MMHG
AV PEAK GRADIENT: 10.76 MMHG
AV VALVE AREA: 1.61 CM2
BSA FOR ECHO PROCEDURE: 1.99 M2
CV ECHO LV RWT: 0.45 CM
CV STRESS BASE HR: 79
DIASTOLIC BLOOD PRESSURE: 73
DOP CALC AO PEAK VEL: 1.64 M/S
DOP CALC AO VTI: 35.8 CM
DOP CALC LVOT AREA: 2.43 CM2
DOP CALC LVOT DIAMETER: 1.76 CM
DOP CALC LVOT STROKE VOLUME: 57.73 CM3
DOP CALCLVOT PEAK VEL VTI: 23.74 CM
E WAVE DECELERATION TIME: 286.44 MSEC
E/A RATIO: 0.69
E/E' RATIO: 10.71
ECHO LV POSTERIOR WALL: 1.08 CM (ref 0.6–1.1)
FRACTIONAL SHORTENING: 42 % (ref 28–44)
INTERVENTRICULAR SEPTUM: 1.08 CM (ref 0.6–1.1)
IVRT: 0.05 MSEC
LEFT ATRIUM SIZE: 3.57 CM
LEFT INTERNAL DIMENSION IN SYSTOLE: 2.81 CM (ref 2.1–4)
LEFT VENTRICLE DIASTOLIC VOLUME INDEX: 54.67 ML/M2
LEFT VENTRICLE DIASTOLIC VOLUME: 108.79 ML
LEFT VENTRICLE MASS INDEX: 95.7 G/M2
LEFT VENTRICLE SYSTOLIC VOLUME INDEX: 15 ML/M2
LEFT VENTRICLE SYSTOLIC VOLUME: 29.84 ML
LEFT VENTRICULAR INTERNAL DIMENSION IN DIASTOLE: 4.82 CM (ref 3.5–6)
LEFT VENTRICULAR MASS: 190.37 G
LV LATERAL E/E' RATIO: 10.71
LV SEPTAL E/E' RATIO: 10.71
MV PEAK A VEL: 1.08 M/S
MV PEAK E VEL: 0.75 M/S
MV STENOSIS PRESSURE HALF TIME: 83.07 MS
MV VALVE AREA P 1/2 METHOD: 2.65 CM2
OHS CV CPX 85 PERCENT MAX PREDICTED HEART RATE MALE: 112
OHS CV CPX MAX PREDICTED HEART RATE: 132
OHS CV CPX PATIENT IS FEMALE: 1
OHS CV CPX PATIENT IS MALE: 0
OHS CV CPX PEAK DIASTOLIC BLOOD PRESSURE: 84 MMHG
OHS CV CPX PEAK HEAR RATE: 126
OHS CV CPX PEAK RATE PRESSURE PRODUCT: NORMAL
OHS CV CPX PEAK SYSTOLIC BLOOD PRESSURE: 150
OHS CV CPX PERCENT MAX PREDICTED HEART RATE ACHIEVED: 95
OHS CV CPX RATE PRESSURE PRODUCT PRESENTING: NORMAL
PISA TR MAX VEL: 1.59 M/S
PV PEAK VELOCITY: 1.22 CM/S
RA PRESSURE: 3 MMHG
RIGHT VENTRICULAR END-DIASTOLIC DIMENSION: 2.95 CM
STRESS ECHO POST EXERCISE DUR MIN: 5 MIN
STRESS ECHO POST EXERCISE DUR SEC: 51
SYSTOLIC BLOOD PRESSURE: 147
TDI LATERAL: 0.07
TDI SEPTAL: 0.07
TDI: 0.07
TR MAX PG: 10.11 MMHG
TRICUSPID ANNULAR PLANE SYSTOLIC EXCURSION: 2.32 CM
TV REST PULMONARY ARTERY PRESSURE: 13.11 MMHG

## 2018-11-29 ENCOUNTER — OFFICE VISIT (OUTPATIENT)
Dept: FAMILY MEDICINE | Facility: CLINIC | Age: 83
End: 2018-11-29
Payer: MEDICARE

## 2018-11-29 VITALS
TEMPERATURE: 98 F | BODY MASS INDEX: 33.29 KG/M2 | DIASTOLIC BLOOD PRESSURE: 65 MMHG | SYSTOLIC BLOOD PRESSURE: 160 MMHG | WEIGHT: 195 LBS | HEIGHT: 64 IN

## 2018-11-29 DIAGNOSIS — M54.50 CHRONIC BILATERAL LOW BACK PAIN WITHOUT SCIATICA: Primary | ICD-10-CM

## 2018-11-29 DIAGNOSIS — G89.29 CHRONIC BILATERAL LOW BACK PAIN WITHOUT SCIATICA: Primary | ICD-10-CM

## 2018-11-29 DIAGNOSIS — K11.5 SALIVARY GLAND CALCULI: ICD-10-CM

## 2018-11-29 DIAGNOSIS — N39.45 CONTINUOUS LEAKAGE OF URINE: ICD-10-CM

## 2018-11-29 PROCEDURE — 3078F DIAST BP <80 MM HG: CPT | Mod: CPTII,S$GLB,, | Performed by: NURSE PRACTITIONER

## 2018-11-29 PROCEDURE — 99999 PR PBB SHADOW E&M-EST. PATIENT-LVL III: CPT | Mod: PBBFAC,,, | Performed by: NURSE PRACTITIONER

## 2018-11-29 PROCEDURE — 3077F SYST BP >= 140 MM HG: CPT | Mod: CPTII,S$GLB,, | Performed by: NURSE PRACTITIONER

## 2018-11-29 PROCEDURE — 1101F PT FALLS ASSESS-DOCD LE1/YR: CPT | Mod: CPTII,S$GLB,, | Performed by: NURSE PRACTITIONER

## 2018-11-29 PROCEDURE — 99204 OFFICE O/P NEW MOD 45 MIN: CPT | Mod: S$GLB,,, | Performed by: NURSE PRACTITIONER

## 2019-02-18 DIAGNOSIS — I10 HTN (HYPERTENSION), BENIGN: Primary | ICD-10-CM

## 2019-02-18 RX ORDER — LISINOPRIL 40 MG/1
40 TABLET ORAL NIGHTLY
Qty: 90 TABLET | Refills: 1 | OUTPATIENT
Start: 2019-02-18

## 2019-02-21 RX ORDER — LISINOPRIL 40 MG/1
TABLET ORAL
Qty: 90 TABLET | Refills: 1 | OUTPATIENT
Start: 2019-02-21

## 2019-03-12 RX ORDER — LISINOPRIL 40 MG/1
40 TABLET ORAL NIGHTLY
Qty: 90 TABLET | Refills: 2 | Status: SHIPPED | OUTPATIENT
Start: 2019-03-12 | End: 2019-05-13 | Stop reason: SDUPTHER

## 2019-03-12 NOTE — TELEPHONE ENCOUNTER
+Refill request. Please advise.  Thank you.      ----- Message from Alejo MIKE Fermin sent at 3/12/2019  2:33 PM CDT -----  Contact: pt  Type:  RX Refill Request    Who Called:  pt  Refill or New Rx:  Refill / NEW (previously filled by Dr Celeste)  RX Name and Strength:  lisinopril (PRINIVIL,ZESTRIL) 40 MG tablet  How is the patient currently taking it? (ex. 1XDay):  1 x day  Is this a 30 day or 90 day RX:  90  Preferred Pharmacy with phone number:      [facility]/pharmacy #[id number] - [place], [place] - [address]  [address] [place] [address]  Phone: [phone] Fax: [phone]    Local or Mail Order:    Ordering Provider:    Best Call Back Number:  [phone]  Additional Information:  Pt is wanting this Rx to be filled by Dr Franklin going forward since she does not see Dr Hedrick anymore. Pt only has 2 pills left and is requesting a call back from the office to discuss.

## 2019-04-01 ENCOUNTER — OFFICE VISIT (OUTPATIENT)
Dept: UROLOGY | Facility: CLINIC | Age: 84
End: 2019-04-01
Payer: MEDICARE

## 2019-04-01 VITALS
WEIGHT: 194.69 LBS | DIASTOLIC BLOOD PRESSURE: 77 MMHG | BODY MASS INDEX: 33.24 KG/M2 | HEART RATE: 54 BPM | SYSTOLIC BLOOD PRESSURE: 136 MMHG | HEIGHT: 64 IN

## 2019-04-01 DIAGNOSIS — N20.0 NEPHROLITHIASIS: ICD-10-CM

## 2019-04-01 DIAGNOSIS — R79.9 ABNORMAL FINDING OF BLOOD CHEMISTRY: ICD-10-CM

## 2019-04-01 DIAGNOSIS — R32 URINARY INCONTINENCE, UNSPECIFIED TYPE: Primary | ICD-10-CM

## 2019-04-01 LAB
BILIRUB SERPL-MCNC: ABNORMAL MG/DL
BLOOD URINE, POC: ABNORMAL
COLOR, POC UA: ABNORMAL
GLUCOSE UR QL STRIP: ABNORMAL
KETONES UR QL STRIP: ABNORMAL
LEUKOCYTE ESTERASE URINE, POC: ABNORMAL
NITRITE, POC UA: ABNORMAL
PH, POC UA: ABNORMAL
PROTEIN, POC: ABNORMAL
SPECIFIC GRAVITY, POC UA: 1.02
UROBILINOGEN, POC UA: ABNORMAL

## 2019-04-01 PROCEDURE — 81002 URINALYSIS NONAUTO W/O SCOPE: CPT | Mod: S$GLB,,, | Performed by: UROLOGY

## 2019-04-01 PROCEDURE — 3078F PR MOST RECENT DIASTOLIC BLOOD PRESSURE < 80 MM HG: ICD-10-PCS | Mod: CPTII,S$GLB,, | Performed by: UROLOGY

## 2019-04-01 PROCEDURE — 99215 OFFICE O/P EST HI 40 MIN: CPT | Mod: 25,S$GLB,, | Performed by: UROLOGY

## 2019-04-01 PROCEDURE — 99999 PR PBB SHADOW E&M-EST. PATIENT-LVL III: CPT | Mod: PBBFAC,,, | Performed by: UROLOGY

## 2019-04-01 PROCEDURE — 3075F SYST BP GE 130 - 139MM HG: CPT | Mod: CPTII,S$GLB,, | Performed by: UROLOGY

## 2019-04-01 PROCEDURE — 3075F PR MOST RECENT SYSTOLIC BLOOD PRESS GE 130-139MM HG: ICD-10-PCS | Mod: CPTII,S$GLB,, | Performed by: UROLOGY

## 2019-04-01 PROCEDURE — 81002 POCT URINE DIPSTICK WITHOUT MICROSCOPE: ICD-10-PCS | Mod: S$GLB,,, | Performed by: UROLOGY

## 2019-04-01 PROCEDURE — 99999 PR PBB SHADOW E&M-EST. PATIENT-LVL III: ICD-10-PCS | Mod: PBBFAC,,, | Performed by: UROLOGY

## 2019-04-01 PROCEDURE — 99215 PR OFFICE/OUTPT VISIT, EST, LEVL V, 40-54 MIN: ICD-10-PCS | Mod: 25,S$GLB,, | Performed by: UROLOGY

## 2019-04-01 PROCEDURE — 1101F PT FALLS ASSESS-DOCD LE1/YR: CPT | Mod: CPTII,S$GLB,, | Performed by: UROLOGY

## 2019-04-01 PROCEDURE — 3078F DIAST BP <80 MM HG: CPT | Mod: CPTII,S$GLB,, | Performed by: UROLOGY

## 2019-04-01 PROCEDURE — 1101F PR PT FALLS ASSESS DOC 0-1 FALLS W/OUT INJ PAST YR: ICD-10-PCS | Mod: CPTII,S$GLB,, | Performed by: UROLOGY

## 2019-04-01 NOTE — PATIENT INSTRUCTIONS
PLEASE READ    1. Call Dr.Rory Mcmanus to make an appointment for primary care with him/his group or nurse practitioner. He will review your labs ordering I am ordering today.   Address: [address] [place] [address]  Phone: [phone]  2. Go get a blood tests done to check your sugars, kidney function and for anemia.  should review with you   3. Go get pictures of your kidneys to look for stone:  an xray and ultrasound to monitor the stone that was still In your kidney  4. Start Myrbetriq 50mg once daily for overactive bladder. Can take at least 4 weeks to work. If it's too expensive call your insurance company with my list and see what other overactive bladder medicines are covered and call us back and we can write for this.  5. Stop drinking coffee.      We will try two medicines. If no improvement will do further testing.     5. FOLLOW UP WITH MY NURSE PRACTIONER IN 6 WEEKS. Might try new medicine if no improvement.

## 2019-04-01 NOTE — PROGRESS NOTES
[facility] Note - [place]  Staff: MD Mady    Referring provider and please cc: self  PCP: none    [facility]: inactive    Chief Complaint: incontinence    Subjective:        HPI: Yisel العلي is a 85 y.o. female presents with     Pt is hard of hearing      Incontinence   She has mixed incontinence. She is  vaginal, + hysterectomy. had a colporaphy but denies vaginal bulge. She's not sure why it didn't last. She's had mixed incontinence for many years (8+) and has worn pads for about 9 years and now wears depends for the last 2 years. She changes depends 6-7 a day. Sounds more like UUI as she describes her leakage on the way to the bathroom. She's never tried an OAB med. She drinks 1.5 cups of coffeein the am. Takes fiber daily and has a bm every to every 3 days (hard in the beginning, sometimes uses a suppository and has to use gloves to empty her stool. Not getting uti's.     nephrolithiasis  [facility] with [facility] to ER on  and was found to have right mid ureteral 4mm stone. She remained in the ICU for 4 days. She had a stent that was placed emergently. That was her first stone.I took her to the OR for right ureteroscopy on 8/3/16. She removed the stent herself. review of ct also showed a 2mm right mid pole stone.      She is also has a h/o left partial nephrectomy in  she thinks for a benign mass. Done in [place] by . Complicated by hematoma.     Urinalysis void: sml wbc  Urine history:   16  No cx, void: tr wbc  16 Ng, void: 1+wbc  16   E.coli sensitive to bactrim      ECOG Status: 0    REVIEW OF SYSTEMS:  General ROS: no fevers, no chills  Psychological ROS: no depression  Endocrine ROS: no heat or cold  Respiratory ROS: no SOB  Cardiovascular ROS: no CP  Gastrointestinal ROS: no abdominal pain, no constipation, no diarrhea, noBRBPR  Musculoskeletal ROS: no muscle pain  Neurological ROS: no  headaches  Dermatological ROS: no rashes  HEENT: noglasses, no sinus   ROS: per HPI     PMHx:  Past Medical History:   Diagnosis Date    Anticoagulant long-term use     Arthritis     CAD (coronary artery disease)     Cancer     skin    Cataract     CKD (chronic kidney disease), stage III     HEARING LOSS     Hematoma complicating a procedure 1213    ant abd wall    [place] (hard of hearing)     BILAT AIDS    Hyperlipidemia     Hypertension     Meniere disease     Pneumonia     Retinal detachment     not sure which eye    Vertigo     Wears glasses      Kidney stones: Yes -    PSHx:  Past Surgical History:   Procedure Laterality Date    carotid surgery      left endarterectomy    CATARACT EXTRACTION      ou    CORONARY STENT PLACEMENT      x1    CYSTOSCOPY, RETROGRADE, URETEROSCOPY, STENT PLACEMENT Right 8/3/2016    [facility]formed [facility] at [facility]    EXTRACTION-STONE-URETEROSCOPY - digital Right 8/3/2016    [facility] at [facility] OR    HYSTERECTOMY      PARTIAL NEPHRECTOMY  1013    benign left kidney neoplasm    RETINAL DETACHMENT SURGERY       Urologic or Gynecologic Surgery: ureteroscopy    Stents/Valves/Foreign Bodies: yes  Cardiologist:   Gynecologist:     /Valves/Foreign Bodies: Yes - 1 placed <10 years ago  Cardiac Evaluation: Yes - [facility]     Screening Studies  Colonoscopy: none recently     Fam Hx:   malignancies: No , gyn malignancies: No .   kidney stones: Yes - son with multiple stones      Soc Hx:  Quit tobacco.  Social smoker  1-2 drinks alcohol per night   Lives in [place]  : passed  Children: 3  Occupation:housewife and teacher     Allergies:  Codeine    Home Medications: reviewed   Urologic Medications: none  Anticoagulation: Yes - supposed to be on plavix but doesn't take for cardiac stent, she is on aspirin    Objective:     Vitals:    04/01/19 1136   BP: 136/77   Pulse: (!) 54       General:WDWN in  NAD  Eyes: PERRLA, normal conjunctiva  Respiratory: no increased work on breathing. No wheezing.   Cardiovascular: No obvious extremity edema. Warm and well perfused.   GI: no palpation of masses. No tenderness. No hepatosplenomegaly to palpation.  Musculoskeletal: normal range of motion of bilateral upper extremities. Normal muscle strength and tone.  Skin: no obvious rashes or lesions. No tightening of skin noted.  Neurologic: CN grossly normal. Normal sensation.   Psychiatric: awake, alert and oriented x 3. Mood and affect normal. Cooperative.    Pelvic exam  deferred    LABS REVIEW:    Lab Results   Component Value Date    WBC 6.60 07/27/2016    HGB 12.8 07/27/2016    HCT 37.4 07/27/2016    MCV 93 07/27/2016     (H) 07/27/2016     BMP  Lab Results   Component Value Date     08/01/2017    K 4.6 08/01/2017     08/01/2017    CO2 23 08/01/2017    BUN 20 08/01/2017    CREATININE 1.0 08/01/2017    CALCIUM 10.5 08/01/2017    ANIONGAP 11 08/01/2017    ESTGFRAFRICA >60 08/01/2017    EGFRNONAA 52 (A) 08/01/2017         PATHOLOGY REVIEW:  none     RADIOGRAPHIC REVIEW:  ctrss 7/8/16  Right punctate kdiney stone rmp 2mm stone  4mm igh ureteral stone at L5  No left renal stones  Hiatal hernia  Fatty liver  Diverticulosis        Assessment:       1. Urinary incontinence, unspecified type    2. Nephrolithiasis          Plan:      Unclear if she is on plavix. Has a h/o cardiac stent. Last saw  11/29/18.    She does not have a pcp - last platelets were elevated in 2016, last glucose was elevated in 2017. No glucose in urine. No recent labs since then. Ordering a cbc, cmp and a1c but she knows she has to follow-up with a primary care. Referral made to Dr.Rory Mcmanus.     1. Call Dr.Rory Mcmanus or  to make an appointment for primary care with him/his group or nurse practitioner. He will review your labs ordering I am ordering today.   Address: [facility] [address] [place] [address]  Phone: [phone]  2. Go get a blood tests done to check your sugars, kidney function and for anemia.  should review with you   3. Go get pictures of your kidneys to look for stone:  an xray and ultrasound to monitor the stone that was still In your kidney  4. Start Myrbetriq 50mg once daily for overactive bladder. Can take at least 4 weeks to work. If it's too expensive call your insurance company with my list and see what other overactive bladder medicines are covered and call us back and we can write for this.  5. Stop drinking coffee.      We will try two medicines. If no improvement will do further testing.     5. FOLLOW UP WITH MY NURSE PRACTIONER IN 6 WEEKS. Might try new medicine if no improvement.  -return to see me if no improvement after 2 oral meds for stress test       Vangie Earl MD

## 2019-04-05 ENCOUNTER — TELEPHONE (OUTPATIENT)
Dept: UROLOGY | Facility: CLINIC | Age: 84
End: 2019-04-05

## 2019-04-05 NOTE — TELEPHONE ENCOUNTER
Pt needs to call insurance company with the list I provided her and find out what is covered. I will write for a medication that is covered    This was told to her on discharge and written clearly in her instructions      ·  If your insurance does not cover the medication I write you for, you will need to call them and find out your co-pays for overactive bladder medications and then notify us which ones are affordable. You can ask them about the following co-pays for each: Vesicare, Enablex, Sanctura, Detrol, Ditropan/oxtybuynin extended release, Myrbetriq.  · Oxybutynin/ditropan 5mg immediate release are the cheapest and have the most side effects and so I typically do not prescribe these.    · For dry mouth: Try sour, sugar free lozenge or gum or try therabreath products over the counter.    · For constipation you can take 2 fiber gummies a day in addition to your regular regimen.

## 2019-04-05 NOTE — TELEPHONE ENCOUNTER
----- Message from Alejo Fermin sent at 4/5/2019 11:30 AM CDT -----  Contact: pt  Type: Needs Medical Advice    Who Called:  pt    Pharmacy name and phone #:   [facility] - [place], [place] - [address] [address]  [address] [address]  Phone: [phone] Fax: [phone]        Best Call Back Number: [phone]  Additional Information: Pt states that the medication mirabegron (MYRBETRIQ) 50 mg Tb24 is too expensive for her. Pt states that Dr. Earl said that she would call in a new rx if it was too expensive. Please call to advise.

## 2019-04-08 ENCOUNTER — TELEPHONE (OUTPATIENT)
Dept: UROLOGY | Facility: CLINIC | Age: 84
End: 2019-04-08

## 2019-04-08 NOTE — TELEPHONE ENCOUNTER
Call placed to inquire did patient contact her insurance to see what medication is covered, so medication can be changed,since the myrbetriq is too expensive. No answer, unable to leave message as mailbox is full.

## 2019-04-11 ENCOUNTER — PATIENT MESSAGE (OUTPATIENT)
Dept: CARDIOLOGY | Facility: CLINIC | Age: 84
End: 2019-04-11

## 2019-04-12 ENCOUNTER — HOSPITAL ENCOUNTER (OUTPATIENT)
Dept: RADIOLOGY | Facility: HOSPITAL | Age: 84
Discharge: HOME | End: 2019-04-12
Attending: UROLOGY
Payer: MEDICARE

## 2019-04-12 ENCOUNTER — TELEPHONE (OUTPATIENT)
Dept: UROLOGY | Facility: CLINIC | Age: 84
End: 2019-04-12

## 2019-04-12 DIAGNOSIS — R79.9 ABNORMAL FINDING OF BLOOD CHEMISTRY: ICD-10-CM

## 2019-04-12 DIAGNOSIS — R32 URINARY INCONTINENCE, UNSPECIFIED TYPE: ICD-10-CM

## 2019-04-12 PROCEDURE — 76770 US RETROPERITONEAL COMPLETE: ICD-10-PCS | Mod: 26,,, | Performed by: RADIOLOGY

## 2019-04-12 PROCEDURE — 74018 RADEX ABDOMEN 1 VIEW: CPT | Mod: 26,,, | Performed by: RADIOLOGY

## 2019-04-12 PROCEDURE — 76770 US EXAM ABDO BACK WALL COMP: CPT | Mod: TC

## 2019-04-12 PROCEDURE — 74018 XR ABDOMEN AP 1 VIEW: ICD-10-PCS | Mod: 26,,, | Performed by: RADIOLOGY

## 2019-04-12 PROCEDURE — 74018 RADEX ABDOMEN 1 VIEW: CPT | Mod: TC,FY

## 2019-04-12 PROCEDURE — 76770 US EXAM ABDO BACK WALL COMP: CPT | Mod: 26,,, | Performed by: RADIOLOGY

## 2019-04-12 NOTE — TELEPHONE ENCOUNTER
----- Message from Sravan Hughes sent at 4/11/2019  4:48 PM CDT -----  Type: Needs Medical Advice    Who Called: Patient    Pharmacy name and phone #:     [facility][id number] - [place], [place] - [address]  [address] [place] [address]  Phone: [phone] Fax: [phone]     Best Call Back Number: [phone]  Additional Information: Patient states that she would like a callback regarding a medication (She doesn't recall the name) that was too expensive and needed a cheaper alternative.

## 2019-04-12 NOTE — TELEPHONE ENCOUNTER
Spoke with patient, informed she will need to contact her insurance company to find out what medication they will cover instead of the myrbetriq. Patient will do and give us a call back with the meds suggested, patient verbally understood.

## 2019-04-21 ENCOUNTER — TELEPHONE (OUTPATIENT)
Dept: UROLOGY | Facility: CLINIC | Age: 84
End: 2019-04-21

## 2019-04-22 NOTE — TELEPHONE ENCOUNTER
----- Message from Clary Mahmood MA sent at 4/18/2019 10:05 AM CDT -----  Called pt re results. Pt also stated she would like a new Rx sent in to [facility] for the bladder medication as previously discussed. Other medication was too expensive.Please advise

## 2019-04-22 NOTE — TELEPHONE ENCOUNTER
The last not I saw about OAB medication was on 4/12 when we asked her to call her insurance bc the myrbetriq was too expensvie and she would call us back. Please reminder her about this exchange.     Then please ask the patient to find the sheet in her discharge paperwork that has the list of all the overactive bladder medications. If the patient cannot find it, give them the list below.    Then ask the patient  to call their pharmacy or insurance and find out exactly what their copay is for each overactive bladder medicine and call us or more preferable write us back with which medicine she would like to try (that she has not tried already) and where she wants it sent.    Remind patient it may take 4 to 6 weeks to see any results, all medications except oxybutynin can cause dry mouth or constipation.     Ditropan/oxybutynin immediate release - this will be the cheapest but have the most side effects.   Ditropan/oxbutynin extended release  Detrol/Tolteridine  Sanctura/trospium  Vesicare/solfenacin  Enablex/darifenacin  Myrbetriq/Mirbegron

## 2019-05-13 NOTE — TELEPHONE ENCOUNTER
+Refill request.  Medication is pended for your review.  Thank you.      ----- Message from Janisstanislav Campos sent at 5/13/2019 11:13 AM CDT -----  Contact: pt  Pt is requesting a refill on her medication(lisinopril (PRINIVIL,ZESTRIL) 40 MG tablet)  Pt stated she is currently out of her medication, need refilled ASAP  Please call to advise  Call back   Thanks   [facility]/pharmacy #[id number] - [place], [place] - [address]  [address] [place] [address]  Phone: [phone] Fax: [phone]

## 2019-05-14 RX ORDER — LISINOPRIL 40 MG/1
40 TABLET ORAL NIGHTLY
Qty: 90 TABLET | Refills: 1 | Status: SHIPPED | OUTPATIENT
Start: 2019-05-14 | End: 2019-07-30 | Stop reason: SDUPTHER

## 2019-05-30 ENCOUNTER — OFFICE VISIT (OUTPATIENT)
Dept: FAMILY MEDICINE | Facility: CLINIC | Age: 84
End: 2019-05-30
Payer: MEDICARE

## 2019-05-30 VITALS
BODY MASS INDEX: 33.97 KG/M2 | RESPIRATION RATE: 16 BRPM | DIASTOLIC BLOOD PRESSURE: 68 MMHG | WEIGHT: 199 LBS | OXYGEN SATURATION: 97 % | TEMPERATURE: 98 F | HEIGHT: 64 IN | SYSTOLIC BLOOD PRESSURE: 142 MMHG | HEART RATE: 62 BPM

## 2019-05-30 DIAGNOSIS — I10 ESSENTIAL (PRIMARY) HYPERTENSION: ICD-10-CM

## 2019-05-30 DIAGNOSIS — N18.30 CKD (CHRONIC KIDNEY DISEASE), STAGE III: ICD-10-CM

## 2019-05-30 DIAGNOSIS — M54.42 CHRONIC BILATERAL LOW BACK PAIN WITH LEFT-SIDED SCIATICA: ICD-10-CM

## 2019-05-30 DIAGNOSIS — Z13.820 SCREENING FOR OSTEOPOROSIS: ICD-10-CM

## 2019-05-30 DIAGNOSIS — E78.2 MIXED HYPERLIPIDEMIA: ICD-10-CM

## 2019-05-30 DIAGNOSIS — Z23 IMMUNIZATION DUE: ICD-10-CM

## 2019-05-30 DIAGNOSIS — G89.29 CHRONIC BILATERAL LOW BACK PAIN WITH LEFT-SIDED SCIATICA: ICD-10-CM

## 2019-05-30 DIAGNOSIS — Z00.00 PREVENTATIVE HEALTH CARE: ICD-10-CM

## 2019-05-30 DIAGNOSIS — N95.1 MENOPAUSAL AND FEMALE CLIMACTERIC STATES: ICD-10-CM

## 2019-05-30 DIAGNOSIS — M15.9 PRIMARY OSTEOARTHRITIS INVOLVING MULTIPLE JOINTS: ICD-10-CM

## 2019-05-30 DIAGNOSIS — I10 HTN (HYPERTENSION), BENIGN: ICD-10-CM

## 2019-05-30 DIAGNOSIS — N20.0 NEPHROLITHIASIS: ICD-10-CM

## 2019-05-30 DIAGNOSIS — E55.9 VITAMIN D DEFICIENCY: ICD-10-CM

## 2019-05-30 DIAGNOSIS — N95.9 MENOPAUSAL AND PERIMENOPAUSAL DISORDER: ICD-10-CM

## 2019-05-30 DIAGNOSIS — I25.118 CORONARY ARTERY DISEASE OF NATIVE ARTERY OF NATIVE HEART WITH STABLE ANGINA PECTORIS: Primary | ICD-10-CM

## 2019-05-30 PROBLEM — E78.5 HYPERLIPIDEMIA: Status: ACTIVE | Noted: 2019-05-30

## 2019-05-30 PROCEDURE — 90670 PNEUMOCOCCAL CONJUGATE VACCINE 13-VALENT LESS THAN 5YO & GREATER THAN: ICD-10-PCS | Mod: ,,, | Performed by: FAMILY MEDICINE

## 2019-05-30 PROCEDURE — 3078F DIAST BP <80 MM HG: CPT | Mod: ,,, | Performed by: FAMILY MEDICINE

## 2019-05-30 PROCEDURE — 1101F PR PT FALLS ASSESS DOC 0-1 FALLS W/OUT INJ PAST YR: ICD-10-PCS | Mod: ,,, | Performed by: FAMILY MEDICINE

## 2019-05-30 PROCEDURE — 3077F PR MOST RECENT SYSTOLIC BLOOD PRESSURE >= 140 MM HG: ICD-10-PCS | Mod: ,,, | Performed by: FAMILY MEDICINE

## 2019-05-30 PROCEDURE — 3077F SYST BP >= 140 MM HG: CPT | Mod: ,,, | Performed by: FAMILY MEDICINE

## 2019-05-30 PROCEDURE — G0009 ADMIN PNEUMOCOCCAL VACCINE: HCPCS | Mod: ,,, | Performed by: FAMILY MEDICINE

## 2019-05-30 PROCEDURE — 90670 PCV13 VACCINE IM: CPT | Mod: ,,, | Performed by: FAMILY MEDICINE

## 2019-05-30 PROCEDURE — 99204 OFFICE O/P NEW MOD 45 MIN: CPT | Mod: 25,,, | Performed by: FAMILY MEDICINE

## 2019-05-30 PROCEDURE — 3078F PR MOST RECENT DIASTOLIC BLOOD PRESSURE < 80 MM HG: ICD-10-PCS | Mod: ,,, | Performed by: FAMILY MEDICINE

## 2019-05-30 PROCEDURE — G0009 PNEUMOCOCCAL CONJUGATE VACCINE 13-VALENT LESS THAN 5YO & GREATER THAN: ICD-10-PCS | Mod: ,,, | Performed by: FAMILY MEDICINE

## 2019-05-30 PROCEDURE — 99204 PR OFFICE/OUTPT VISIT, NEW, LEVL IV, 45-59 MIN: ICD-10-PCS | Mod: 25,,, | Performed by: FAMILY MEDICINE

## 2019-05-30 PROCEDURE — 1101F PT FALLS ASSESS-DOCD LE1/YR: CPT | Mod: ,,, | Performed by: FAMILY MEDICINE

## 2019-05-30 NOTE — PROGRESS NOTES
Subjective:       Patient ID: Yisel العلي is a 85 y.o. female.    Chief Complaint: Establish Care      Patient is here to get established.        Allergies and Medications:   Review of patient's allergies indicates:   Allergen Reactions    Codeine Other (See Comments)     dosent remember reaction;maybe nausea     Current Outpatient Medications   Medication Sig Dispense Refill    aspirin (ECOTRIN) 81 MG EC tablet Take 81 mg by mouth once daily.      diaper,brief,adult,disposable (ADULT BRIEFS - LARGE) Misc 1 each by Misc.(Non-Drug; Combo Route) route 5 (five) times daily. 300 each 11    diaper,brief,adult,disposable Misc       lisinopril (PRINIVIL,ZESTRIL) 40 MG tablet Take 1 tablet (40 mg total) by mouth nightly. 90 tablet 1    mirabegron (MYRBETRIQ) 50 mg Tb24 Take 1 tablet (50 mg total) by mouth once daily. 90 tablet 3    nitroGLYCERIN (NITRODUR) 0.1 mg/hr 1 patch once daily.       tetanus and diphther. tox, PF, (TENIVAC, PF,) 5-2 Lf unit/0.5 mL Syrg Inject 0.5 mLs into the muscle once. for 1 dose 0.5 mL 0    varicella-zoster gE-AS01B, PF, (SHINGRIX, PF,) 50 mcg/0.5 mL injection Inject 0.5 mLs into the muscle once. for 1 dose 0.5 mL 0     No current facility-administered medications for this visit.        Family History:   Family History   Problem Relation Age of Onset    Cancer Father         ?    Heart failure Father     Amblyopia Neg Hx     Blindness Neg Hx     Cataracts Neg Hx     Diabetes Neg Hx     Glaucoma Neg Hx     Hypertension Neg Hx     Macular degeneration Neg Hx     Retinal detachment Neg Hx     Strabismus Neg Hx     Stroke Neg Hx     Thyroid disease Neg Hx        Social History:   Social History     Socioeconomic History    Marital status:      Spouse name: Not on file    Number of children: Not on file    Years of education: Not on file    Highest education level: Not on file   Occupational History    Not on file   Social Needs    Financial resource strain:  Not on file    Food insecurity:     Worry: Not on file     Inability: Not on file    Transportation needs:     Medical: Not on file     Non-medical: Not on file   Tobacco Use    Smoking status: Former Smoker    Smokeless tobacco: Never Used   Substance and Sexual Activity    Alcohol use: Yes     Comment: occasional    Drug use: No    Sexual activity: Not on file   Lifestyle    Physical activity:     Days per week: Not on file     Minutes per session: Not on file    Stress: Not on file   Relationships    Social connections:     Talks on phone: Not on file     Gets together: Not on file     Attends Islam service: Not on file     Active member of club or organization: Not on file     Attends meetings of clubs or organizations: Not on file     Relationship status: Not on file   Other Topics Concern    Not on file   Social History Narrative    Not on file       Review of Systems   HENT: Positive for congestion.         Had a stone assist in the left submandibular cyst.     Respiratory: Positive for cough ( 1 year with production of yellow sputum).    Neurological:        Dizziness x 45 years. Has episodes of brain for but has never had a formal diagnosis of TIA or CVA.       Objective:     Vitals:    05/30/19 1430   BP: (!) 142/68   Pulse:    Resp:    Temp:         Physical Exam    Assessment:       1. Coronary artery disease of native artery of native heart with stable angina pectoris    2. HTN (hypertension), benign    3. Mixed hyperlipidemia    4. Nephrolithiasis    5. CKD (chronic kidney disease), stage III    6. Vitamin D deficiency    7. Chronic bilateral low back pain with left-sided sciatica    8. Primary osteoarthritis involving multiple joints    9. Immunization due    10. Screening for osteoporosis    11. Menopausal and female climacteric states    12. Preventative health care    13. Menopausal and perimenopausal disorder     14. Essential (primary) hypertension         Plan:       Yisel keller  seen today for establish care.    Diagnoses and all orders for this visit:    Coronary artery disease of native artery of native heart with stable angina pectoris    HTN (hypertension), benign    Mixed hyperlipidemia    Nephrolithiasis    CKD (chronic kidney disease), stage III    Vitamin D deficiency    Chronic bilateral low back pain with left-sided sciatica    Primary osteoarthritis involving multiple joints    Immunization due  -     tetanus and diphther. tox, PF, (TENIVAC, PF,) 5-2 Lf unit/0.5 mL Syrg; Inject 0.5 mLs into the muscle once. for 1 dose  -     varicella-zoster gE-AS01B, PF, (SHINGRIX, PF,) 50 mcg/0.5 mL injection; Inject 0.5 mLs into the muscle once. for 1 dose  -     Pneumococcal Conjugate Vaccine (13 Valent) (IM)    Screening for osteoporosis  -     DXA Bone Density Spine And Hip; Future    Menopausal and female climacteric states  -     DXA Bone Density Spine And Hip; Future    Preventative health care  -     CBC auto differential; Future  -     Comprehensive metabolic panel; Future  -     Lipid panel; Future  -     Urinalysis; Future  -     CBC auto differential  -     Comprehensive metabolic panel  -     Lipid panel  -     Urinalysis    Menopausal and perimenopausal disorder   -     DXA Bone Density Spine And Hip; Future    Essential (primary) hypertension   -     Lipid panel; Future  -     Lipid panel         Follow up in about 2 months (around 7/30/2019) for with Destinee Irvin.

## 2019-07-30 RX ORDER — LISINOPRIL 40 MG/1
40 TABLET ORAL NIGHTLY
Qty: 90 TABLET | Refills: 1 | Status: SHIPPED | OUTPATIENT
Start: 2019-07-30 | End: 2019-08-06 | Stop reason: SDUPTHER

## 2019-07-30 NOTE — TELEPHONE ENCOUNTER
----- Message from Veronica Kirkpatrick sent at 7/30/2019  4:31 PM CDT -----    Type:  RX Refill Request    Who Called:  pt  Refill  RX Name and Strength: lisinopril  40  mg   How is the patient currently taking it? (ex. 1XDay): 1  A day  Is this a 30 day or 90 day RX:  90 day  Preferred Pharmacy with phone number:   [facility]/pharmacy #[id number] - [place], [place] - [address]  [address] [place] [address]  Phone: [phone] Fax: [phone]  Best Call Back Number: [phone]    Pt  Was  Moving and  Lost   The  Med // please call  If  Any  questions

## 2019-08-06 RX ORDER — LISINOPRIL 40 MG/1
40 TABLET ORAL NIGHTLY
Qty: 30 TABLET | Refills: 2 | Status: SHIPPED | OUTPATIENT
Start: 2019-08-06 | End: 2019-12-18 | Stop reason: SDUPTHER

## 2019-08-06 NOTE — TELEPHONE ENCOUNTER
----- Message from Karolyn Wood sent at 8/6/2019 10:01 AM CDT -----  Contact: self   Patient need a refill on lisinopril 40 MG 30day supply please send to [facility], any questions please call back at [phone] (home)     [facility]/pharmacy #[id number] - [place], [place] - [address]  [address] [place] [address]  Phone: [phone] Fax: [phone]

## 2019-09-10 ENCOUNTER — OFFICE VISIT (OUTPATIENT)
Dept: CARDIOLOGY | Facility: CLINIC | Age: 84
End: 2019-09-10
Payer: MEDICARE

## 2019-09-10 VITALS
OXYGEN SATURATION: 96 % | SYSTOLIC BLOOD PRESSURE: 138 MMHG | WEIGHT: 194 LBS | RESPIRATION RATE: 19 BRPM | HEIGHT: 64 IN | BODY MASS INDEX: 33.12 KG/M2 | DIASTOLIC BLOOD PRESSURE: 86 MMHG | TEMPERATURE: 98 F | HEART RATE: 60 BPM

## 2019-09-10 DIAGNOSIS — I10 HTN (HYPERTENSION), BENIGN: ICD-10-CM

## 2019-09-10 DIAGNOSIS — I25.118 CORONARY ARTERY DISEASE OF NATIVE ARTERY OF NATIVE HEART WITH STABLE ANGINA PECTORIS: ICD-10-CM

## 2019-09-10 DIAGNOSIS — I11.9 HYPERTENSIVE HEART DISEASE WITHOUT HEART FAILURE: ICD-10-CM

## 2019-09-10 DIAGNOSIS — E65 ABDOMINAL OBESITY: ICD-10-CM

## 2019-09-10 DIAGNOSIS — R42 DIZZINESS: ICD-10-CM

## 2019-09-10 DIAGNOSIS — E78.2 MIXED HYPERLIPIDEMIA: ICD-10-CM

## 2019-09-10 DIAGNOSIS — R41.3 MEMORY DIFFICULTIES: ICD-10-CM

## 2019-09-10 DIAGNOSIS — N18.30 CKD (CHRONIC KIDNEY DISEASE), STAGE III: ICD-10-CM

## 2019-09-10 DIAGNOSIS — R07.89 ATYPICAL CHEST PAIN: Primary | ICD-10-CM

## 2019-09-10 DIAGNOSIS — Z95.5 STATUS POST CORONARY ARTERY STENT PLACEMENT: ICD-10-CM

## 2019-09-10 DIAGNOSIS — Z91.89 SEDENTARY LIFESTYLE: ICD-10-CM

## 2019-09-10 DIAGNOSIS — R26.89 POOR BALANCE: ICD-10-CM

## 2019-09-10 DIAGNOSIS — R09.89 BRUIT OF RIGHT CAROTID ARTERY: ICD-10-CM

## 2019-09-10 DIAGNOSIS — Z98.890 HISTORY OF LEFT-SIDED CAROTID ENDARTERECTOMY: ICD-10-CM

## 2019-09-10 PROCEDURE — 99215 OFFICE O/P EST HI 40 MIN: CPT | Mod: S$GLB,,, | Performed by: INTERNAL MEDICINE

## 2019-09-10 PROCEDURE — 99999 PR PBB SHADOW E&M-EST. PATIENT-LVL III: ICD-10-PCS | Mod: PBBFAC,,, | Performed by: INTERNAL MEDICINE

## 2019-09-10 PROCEDURE — 99215 PR OFFICE/OUTPT VISIT, EST, LEVL V, 40-54 MIN: ICD-10-PCS | Mod: S$GLB,,, | Performed by: INTERNAL MEDICINE

## 2019-09-10 PROCEDURE — 3075F PR MOST RECENT SYSTOLIC BLOOD PRESS GE 130-139MM HG: ICD-10-PCS | Mod: CPTII,S$GLB,, | Performed by: INTERNAL MEDICINE

## 2019-09-10 PROCEDURE — 1101F PR PT FALLS ASSESS DOC 0-1 FALLS W/OUT INJ PAST YR: ICD-10-PCS | Mod: CPTII,S$GLB,, | Performed by: INTERNAL MEDICINE

## 2019-09-10 PROCEDURE — 1101F PT FALLS ASSESS-DOCD LE1/YR: CPT | Mod: CPTII,S$GLB,, | Performed by: INTERNAL MEDICINE

## 2019-09-10 PROCEDURE — 3079F PR MOST RECENT DIASTOLIC BLOOD PRESSURE 80-89 MM HG: ICD-10-PCS | Mod: CPTII,S$GLB,, | Performed by: INTERNAL MEDICINE

## 2019-09-10 PROCEDURE — 99999 PR PBB SHADOW E&M-EST. PATIENT-LVL III: CPT | Mod: PBBFAC,,, | Performed by: INTERNAL MEDICINE

## 2019-09-10 PROCEDURE — 3075F SYST BP GE 130 - 139MM HG: CPT | Mod: CPTII,S$GLB,, | Performed by: INTERNAL MEDICINE

## 2019-09-10 PROCEDURE — 3079F DIAST BP 80-89 MM HG: CPT | Mod: CPTII,S$GLB,, | Performed by: INTERNAL MEDICINE

## 2019-09-10 NOTE — PROGRESS NOTES
" Patient ID:  Yisel العلي is a 85 y.o. female who presents for Follow-up (6 month f/u)      Health literacy: Medium  Activities: ADL's only  Nicotine: Never  Alcohol: 1-2 glasses wine/week  Illicit drugs: Denies  Cardiac symptoms: None at present   Home BP:Yes - 140/80's  Medication compliance: Yes   Diet: regular, starting low calorie  Caffeine: 2 cups coffee/day  Labs: 11/082018 & 04/12/2019: No Troponin and TSH; .0; A1C 5.6; Sodium 135L; K+ 4.5; GFR 58L; Glucose 99; WBC 6.9; Hemoglobin 13.2; High Sensitivity 0.30  Last Echo: 11/2018  Last stress test: 11/08/2018  Cardiovascular angiogram: Can't remember   ECG:   Fundoscopic exam: 08/2019, No retinopathy noted at present    No flowsheet data found.      HPI    Review of Systems   Constitution: Negative.        Neck 14; Waist 44; Hip 45   HENT: Positive for hearing loss ([place]) and sore throat (Left Tonsil stone).    Eyes: Negative.    Cardiovascular: Positive for leg swelling (Bilateral lower extrimities).   Respiratory: Negative.         Carnegie = 8   Endocrine: Negative.    Hematologic/Lymphatic: Negative.    Skin: Positive for skin cancer (Right lower leg, removed 06/2019) and suspicious lesions (Frequesnts her dermatologist).   Musculoskeletal: Positive for arthritis (Hip & knees), back pain (If standing for long periods) and muscle cramps ("ivanna horses" to bilateral lower extrimities and in the left side of the neck).   Gastrointestinal: Positive for abdominal pain (ULQ, pain is mild) and constipation (Treated with OTC Fiber).   Genitourinary: Positive for bladder incontinence and non-menstrual bleeding (Menopause).   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   Psychiatric/Behavioral: Positive for memory loss (Short term).   Allergic/Immunologic: Negative.         Objective:    " Physical Exam      Assessment:       No diagnosis found.     Plan:         There are no diagnoses linked to this encounter.

## 2019-09-10 NOTE — PROGRESS NOTES
"Subjective:    Patient ID:  Yisel العلي is a 85 y.o. female who presents for evaluation of Follow-up (6 month f/u)  for atypical CP and chronic dizziness  Came on own  PCP: Dr. Mcmanus in [place], considering changing to Dr. Martinez  Gyn: [place]. Carmona in [facility]  Prior cardiologist: Dr. Celeste, last seen in 3/2018  Lives alone, no pet, worry about tripping with poor balance for 5 years. Have moved to a big house in [facility] [facility], able to walk inside with walker   Daughter, Vandana, RN in [place], do not have POA   Son, David, in [place] do have POA, not medical  Retire teacher, [employer], play competitive bridge twice a week    Difficult historian with memory difficulties.     Health literacy: Medium  Activities: ADL's only, no regular exercise, planning to do more walking in the new home  Nicotine: Never  Alcohol: 1-2 glasses wine/week  Illicit drugs: Denies  Cardiac symptoms: None at present   Home BP:Yes - 140/80's  Medication compliance: Yes   Diet: regular, starting low calorie  Caffeine: 2 cups coffee/day  Labs: 2018 & 2019: No Troponin and TSH; .0 not on Rx, own preference; A1C 5.6%; Sodium 135L; K+ 4.5; GFR 58L; Glucose 99; WBC 6.9; Hemoglobin 13.2; High Sensitivity 0.30  Last Echo: 2018, DSE  Last stress test: 2018  Cardiovascular angiogram: Can't remember   EC2018, SB, 58, right axis  Fundoscopic exam: 2019, No retinopathy noted at present    In 2018:  Follow-up   Associated symptoms include chest pain, congestion, myalgias, neck pain and vertigo. Pertinent negatives include no coughing, diaphoresis, fever, headaches, joint swelling, nausea, numbness or weakness.     Dr. Celeste's note - "84 y.o. female who presents for Coronary Artery Disease (Labs); Hypertension; and Hyperlipidemia   DISCUSSED LABS AND GOALS, , NOT TAKING MEDS, , CR 1.06, NO CP OR OCC / SOB, NOT ACTIVE.   Recall CAD with single stent placed maybe 15+ year " ago. Do not recall any cardiac testing. ECG in sinus bradycardia with right axis deviation. Had smoked socially during college. 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 No recent repeat lipid panel. Report compliance with medications with restart of Pravastatin since visit in 3/2018. Not active now except for card playing, stopped walking in the poor for the last 3 weeks due to the water temperature. Was doing about an hour a day. Recall being on Ranexa for a number of years, only take one a day, do not see much benefit. Lot of worries about the not able to walk due to OA with CLBP with left sciatica, also concern of poor balance and falling. Willing to consider going to the gym.    HPI comments: in 9/2019, return for routine follow up. Still some concern about atypical CP for the past 12 years with some chest wall tenderness with hand pressure, also some recurrent dizziness also over the past 25 years. Worry about Carotid stenosis, post left side CEA.    DSE 11/2018 Left ventricle shows mild concentric hypertrophy.  Normal central venous pressure (3 mm Hg).  The estimated PA systolic pressure is 13.11 mm Hg  Left ventricle ejection fraction is increased (hyperdynamic) at 73%  Normal LV diastolic function.  RV systolic function is normal.  There were no arrhythmias during stress.  The EKG portion of this study is negative for myocardial ischemia.  The patient reported no symptoms during the stress test.  No Echo evidence for ischemia      Review of Systems   Constitution: Negative for diaphoresis, fever, malaise/fatigue, night sweats and weight gain.   HENT: Positive for congestion. Negative for nosebleeds and tinnitus.    Eyes: Positive for redness. Negative for visual disturbance.   Cardiovascular: Positive for chest pain and irregular heartbeat. Negative for claudication, cyanosis, dyspnea on exertion, leg swelling, near-syncope, orthopnea, palpitations and paroxysmal nocturnal dyspnea.  "  Respiratory: Positive for snoring and sputum production. Negative for cough, shortness of breath, sleep disturbances due to breathing and wheezing.         Hallam score 11   Endocrine: Positive for polyuria. Negative for polydipsia.   Hematologic/Lymphatic: Does not bruise/bleed easily.   Skin: Positive for itching. Negative for color change, flushing, nail changes, poor wound healing and suspicious lesions.   Musculoskeletal: Positive for arthritis, back pain, joint pain, muscle cramps, muscle weakness, myalgias, neck pain and stiffness. Negative for falls, gout and joint swelling.   Gastrointestinal: Positive for bloating, bowel incontinence and constipation. Negative for heartburn, hematemesis, hematochezia, melena and nausea.   Genitourinary: Positive for bladder incontinence, flank pain, frequency, nocturia, pelvic pain and urgency.   Neurological: Positive for excessive daytime sleepiness, dizziness, light-headedness, loss of balance, paresthesias, sensory change and vertigo. Negative for disturbances in coordination, focal weakness, headaches, numbness and weakness.   Psychiatric/Behavioral: Positive for memory loss. Negative for depression and substance abuse. The patient does not have insomnia and is not nervous/anxious.      Constitution: Negative.        Neck 14; Waist 44; Hip 45   HENT: Positive for hearing loss ([place]) and sore throat (Left Tonsil stone).    Eyes: Negative.    Cardiovascular: Positive for leg swelling (Bilateral lower extrimities).   Respiratory: Negative.         Hallam = 8   Endocrine: Negative.    Hematologic/Lymphatic: Negative.    Skin: Positive for skin cancer (Right lower leg, removed 06/2019) and suspicious lesions (Frequesnts her dermatologist).   Musculoskeletal: Positive for arthritis (Hip & knees), back pain (If standing for long periods) and muscle cramps ("ivanna horses" to bilateral lower extrimities and in the left side of the neck).   Gastrointestinal: Positive " "for abdominal pain (ULQ, pain is mild) and constipation (Treated with OTC Fiber).   Genitourinary: Positive for bladder incontinence and non-menstrual bleeding (Menopause).   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   Psychiatric/Behavioral: Positive for memory loss (Short term).   Allergic/Immunologic: Negative.       Objective:    Physical Exam   Constitutional: She is oriented to person, place, and time. She appears well-developed and well-nourished.   HENT:   Head: Normocephalic.   Eyes: Pupils are equal, round, and reactive to light. Conjunctivae and EOM are normal.   Neck: Normal range of motion. Neck supple. No JVD present. No thyromegaly present.   Circumference 14"   Cardiovascular: Normal rate, regular rhythm and intact distal pulses. Exam reveals no gallop and no friction rub.   Murmur heard.   Harsh midsystolic murmur is present with a grade of 2/6 at the upper right sternal border radiating to the neck.  Pulses:       Carotid pulses are 2+ on the right side with bruit, and 2+ on the left side.       Radial pulses are 2+ on the right side, and 2+ on the left side.        Femoral pulses are 2+ on the right side, and 2+ on the left side.       Popliteal pulses are 2+ on the right side, and 2+ on the left side.        Dorsalis pedis pulses are 2+ on the right side, and 2+ on the left side.        Posterior tibial pulses are 2+ on the right side, and 2+ on the left side.   Pulmonary/Chest: Effort normal and breath sounds normal. She has no rales. She exhibits no tenderness.   Abdominal: Soft. Bowel sounds are normal. There is no tenderness.   Waist 46", hip 45"   Musculoskeletal: Normal range of motion. She exhibits no edema.   Lymphadenopathy:     She has no cervical adenopathy.   Neurological: She is alert and oriented to person, place, and " time.   Skin: Skin is warm and dry. No rash noted.         Assessment:       1. Atypical chest pain, onset 2007    2. Abdominal obesity    3. BMI 33.0-33.9,adult    4. Coronary artery disease of native artery of native heart with stable angina pectoris    5. HTN (hypertension), benign    6. Mixed hyperlipidemia, baseline LDL not available    7. Hypertensive heart disease without heart failure    8. Memory difficulties    9. Poor balance, onset 2014    10. Status post coronary artery stent placement    11. Sedentary lifestyle    12. CKD (chronic kidney disease), stage III    13. History of left-sided carotid endarterectomy    14. Dizziness, onset 1995    15. Bruit of right carotid artery         Plan:       Yisel was seen today for follow-up.    Diagnoses and all orders for this visit:    Atypical chest pain, onset 2007    Abdominal obesity    BMI 33.0-33.9,adult    Coronary artery disease of native artery of native heart with stable angina pectoris    HTN (hypertension), benign    Mixed hyperlipidemia, baseline LDL not available    Hypertensive heart disease without heart failure    Memory difficulties    Poor balance, onset 2014    Status post coronary artery stent placement    Sedentary lifestyle    CKD (chronic kidney disease), stage III    History of left-sided carotid endarterectomy  -     US Carotid Bilateral; Future    Dizziness, onset 1995  -     US Carotid Bilateral; Future    Bruit of right carotid artery  -     US Carotid Bilateral; Future    - All medical issues reviewed, continue current Rx. Do not want statin Rx  - CV status stable, all medications reviewed, patient acknowledge good understanding.  - Instruction for Mediterranean diet and heart healthy exercise given.  - Check home blood pressure, 2 days weekly, do 2 readings within 5 minutes in AM and PM, keep log for review.  - Weigh twice weekly, try to lose 1-2 lbs per week  - Highly recommend 30 minutes of exercise daily, can have Sunday off, with  2-3 sessions of muscle strengthening weekly. A  would be very helpful.  - Highly recommend Yoga, Macho-Chi and or meditation  - Recommend at least biannual cardiovascular evaluation in view of her significant risk factors. Patient's preference  - Phone review / encourage use of [facility]      Patient Active Problem List   Diagnosis    Coronary artery disease of native artery of native heart with stable angina pectoris    Elevated fasting glucose    Hypercalcemia    Mixed hyperlipidemia, baseline LDL not available    HTN (hypertension), benign    Annual physical exam    Rhinitis    Tear of retina without detachment - Right Eye    EPIPHORA    Dry eyes    Pseudophakia - Both Eyes    Vitamin D deficiency    Anemia    Difficulty walking    Lower extremity pain    Back pain    Nephrolithiasis    Hypertensive heart disease without heart failure    Anxiety    Stress    Hyponatremia    Obesity, Class I, BMI 30-34.9    Memory difficulties    Poor balance, onset 2014    Status post coronary artery stent placement    BMI 33.0-33.9,adult    Abdominal obesity    Sedentary lifestyle    Chronic bilateral low back pain with left-sided sciatica    Excessive daytime sleepiness    Hyperlipidemia    CKD (chronic kidney disease), stage III    Osteoarthritis of multiple joints    HEARING LOSS    Atypical chest pain, onset 2007    History of left-sided carotid endarterectomy    Dizziness, onset 1985    Bruit of right carotid artery     Total face-to-face time with the patient was 35 minutes and greater than 50% was spent in counseling and coordination of care. The above assessment and plan have been discussed at length. Labs and procedure over the last 6 months reviewed. Problem List updated. Asked to bring in all active medications / pills bottles with next visit.

## 2019-09-10 NOTE — LETTER
September 10, 2019      Jimy Mcmanus MD  [address]  [address]  [place] [address]           [facility]  [address], [address]  [place] [address]  Phone: [phone]  Fax: [phone]          Patient: Yisel العلي   MR Number: [id number]   YOB: 1933   Date of Visit: 9/10/2019       Dear Dr. Jimy Mcmanus:    Thank you for referring Yisel العلي to me for evaluation. Attached you will find relevant portions of my assessment and plan of care.    If you have questions, please do not hesitate to call me. I look forward to following Yisel العلي along with you.    Sincerely,    Raphael Franklin MD    Enclosure  CC:  No Recipients    If you would like to receive this communication electronically, please contact [email or web address] or [phone] to request more information on [facility] Link access.    For providers and/or their staff who would like to refer a patient to [facility], please contact us through our one-stop-shop provider referral line, [facility], at [phone].    If you feel you have received this communication in error or would no longer like to receive these types of communications, please e-mail [email or web address]

## 2019-09-25 ENCOUNTER — PATIENT OUTREACH (OUTPATIENT)
Dept: ADMINISTRATIVE | Facility: HOSPITAL | Age: 84
End: 2019-09-25

## 2019-09-25 NOTE — LETTER
October 7, 2019    Yisel العلي  [address] [place] [address]             [facility]  [address]  [facility] [address]  Phone: [phone] Dear Yisel العلي,     [facility] is committed to your overall health.  To help you get the most out of each of your visits, we will review your information to make sure you are up to date on all of your recommended tests and/or procedures.       As a new patient to KAREY FONG MD , we may not have your complete medical records. She has found that your chart shows you may be due for:     TETANUS VACCINE   Shingles Vaccine(1 of 2)   DEXA SCAN   Influenza Vaccine     If you have had any of the above done at another facility, please bring the records or information with you so that your record at [facility] will be complete.       If you are currently taking medication, please bring it with you to your appointment for review.     Also, if you have any type of Advanced Directives, please bring them with you to your office visit so we may scan them into your chart.       Thank You,   Your [facility] Team   Glory Coates L.P.N. Clinical Care Coordinator   [address] [place] [address], [place] [address] [phone]

## 2019-10-01 ENCOUNTER — HOSPITAL ENCOUNTER (OUTPATIENT)
Dept: RADIOLOGY | Facility: HOSPITAL | Age: 84
Discharge: HOME | End: 2019-10-01
Attending: INTERNAL MEDICINE
Payer: MEDICARE

## 2019-10-01 DIAGNOSIS — Z98.890 HISTORY OF LEFT-SIDED CAROTID ENDARTERECTOMY: ICD-10-CM

## 2019-10-01 DIAGNOSIS — R42 DIZZINESS: ICD-10-CM

## 2019-10-01 DIAGNOSIS — R09.89 BRUIT OF RIGHT CAROTID ARTERY: ICD-10-CM

## 2019-10-01 PROCEDURE — 93880 EXTRACRANIAL BILAT STUDY: CPT | Mod: 26,,, | Performed by: RADIOLOGY

## 2019-10-01 PROCEDURE — 93880 EXTRACRANIAL BILAT STUDY: CPT | Mod: TC

## 2019-10-01 PROCEDURE — 93880 US CAROTID BILATERAL: ICD-10-PCS | Mod: 26,,, | Performed by: RADIOLOGY

## 2019-10-03 ENCOUNTER — OFFICE VISIT (OUTPATIENT)
Dept: UROLOGY | Facility: CLINIC | Age: 84
End: 2019-10-03
Payer: MEDICARE

## 2019-10-03 VITALS
RESPIRATION RATE: 16 BRPM | WEIGHT: 194.25 LBS | TEMPERATURE: 98 F | HEART RATE: 61 BPM | DIASTOLIC BLOOD PRESSURE: 76 MMHG | SYSTOLIC BLOOD PRESSURE: 156 MMHG | BODY MASS INDEX: 33.16 KG/M2 | HEIGHT: 64 IN

## 2019-10-03 DIAGNOSIS — R32 URINARY INCONTINENCE, UNSPECIFIED TYPE: Primary | ICD-10-CM

## 2019-10-03 LAB
BILIRUB SERPL-MCNC: NORMAL MG/DL
BLOOD URINE, POC: NORMAL
COLOR, POC UA: NORMAL
GLUCOSE UR QL STRIP: NORMAL
KETONES UR QL STRIP: NORMAL
LEUKOCYTE ESTERASE URINE, POC: NORMAL
NITRITE, POC UA: NORMAL
PH, POC UA: 6
PROTEIN, POC: NORMAL
SPECIFIC GRAVITY, POC UA: 1.01
UROBILINOGEN, POC UA: NORMAL

## 2019-10-03 PROCEDURE — 99214 OFFICE O/P EST MOD 30 MIN: CPT | Mod: 25,S$GLB,, | Performed by: NURSE PRACTITIONER

## 2019-10-03 PROCEDURE — 99999 PR PBB SHADOW E&M-EST. PATIENT-LVL III: CPT | Mod: PBBFAC,,, | Performed by: NURSE PRACTITIONER

## 2019-10-03 PROCEDURE — 1101F PT FALLS ASSESS-DOCD LE1/YR: CPT | Mod: CPTII,S$GLB,, | Performed by: NURSE PRACTITIONER

## 2019-10-03 PROCEDURE — 1101F PR PT FALLS ASSESS DOC 0-1 FALLS W/OUT INJ PAST YR: ICD-10-PCS | Mod: CPTII,S$GLB,, | Performed by: NURSE PRACTITIONER

## 2019-10-03 PROCEDURE — 3078F DIAST BP <80 MM HG: CPT | Mod: CPTII,S$GLB,, | Performed by: NURSE PRACTITIONER

## 2019-10-03 PROCEDURE — 81002 POCT URINE DIPSTICK WITHOUT MICROSCOPE: ICD-10-PCS | Mod: S$GLB,,, | Performed by: NURSE PRACTITIONER

## 2019-10-03 PROCEDURE — 3078F PR MOST RECENT DIASTOLIC BLOOD PRESSURE < 80 MM HG: ICD-10-PCS | Mod: CPTII,S$GLB,, | Performed by: NURSE PRACTITIONER

## 2019-10-03 PROCEDURE — 99999 PR PBB SHADOW E&M-EST. PATIENT-LVL III: ICD-10-PCS | Mod: PBBFAC,,, | Performed by: NURSE PRACTITIONER

## 2019-10-03 PROCEDURE — 99214 PR OFFICE/OUTPT VISIT, EST, LEVL IV, 30-39 MIN: ICD-10-PCS | Mod: 25,S$GLB,, | Performed by: NURSE PRACTITIONER

## 2019-10-03 PROCEDURE — 3077F PR MOST RECENT SYSTOLIC BLOOD PRESSURE >= 140 MM HG: ICD-10-PCS | Mod: CPTII,S$GLB,, | Performed by: NURSE PRACTITIONER

## 2019-10-03 PROCEDURE — 3077F SYST BP >= 140 MM HG: CPT | Mod: CPTII,S$GLB,, | Performed by: NURSE PRACTITIONER

## 2019-10-03 PROCEDURE — 81002 URINALYSIS NONAUTO W/O SCOPE: CPT | Mod: S$GLB,,, | Performed by: NURSE PRACTITIONER

## 2019-10-03 RX ORDER — OXYBUTYNIN CHLORIDE 10 MG/1
10 TABLET, EXTENDED RELEASE ORAL DAILY
Qty: 30 TABLET | Refills: 1 | Status: SHIPPED | OUTPATIENT
Start: 2019-10-03 | End: 2019-10-25 | Stop reason: SDUPTHER

## 2019-10-03 NOTE — LETTER
October 8, 2019      Josefina Martinez DO  [address]  [place] [address]           [facility]  [address]  [place] [address]  Phone: [phone]  Fax: [phone]          Patient: Yisel العلي   MR Number: [id number]   YOB: 1933   Date of Visit: 10/3/2019       Dear Dr. Josefina Martinez:    Thank you for referring Yisel العلي to me for evaluation. Attached you will find relevant portions of my assessment and plan of care.    If you have questions, please do not hesitate to call me. I look forward to following Yisel العلي along with you.    Sincerely,    Poppy Lagunas, NP    Enclosure  CC:  No Recipients    If you would like to receive this communication electronically, please contact [email or web address] or [phone] to request more information on [facility] Link access.    For providers and/or their staff who would like to refer a patient to [facility], please contact us through our one-stop-shop provider referral line, [facility] , at [phone].    If you feel you have received this communication in error or would no longer like to receive these types of communications, please e-mail [email or web address]

## 2019-10-07 NOTE — PROGRESS NOTES
"Attempted to outreach patient for pre-visit via "[employer]", no answer after a week. Sending outreach via Mail Out Letter now.    "

## 2019-10-08 NOTE — PROGRESS NOTES
"Chief Complaint  Chief Complaint   Patient presents with    Urinary Incontinence     HPI:  Yisel العلي is a 85 y.o. female with medical diagnoses as listed and reviewed within the medical history and problem list that presents for evaluation for incontinence. Pt last seen by  in 2019 and given Myrbetric, which she never got filled due to cost.   She has mixed incontinence. She is  vaginal, + hysterectomy. had a colporaphy but denies vaginal bulge. She reports that she began activity quickly post-op and the repair did not last but about a year. She's had mixed incontinence for many years (8+) and has worn pads for about 9 years and now wears depends for the last 2 years. She changes depends 6-7 a day. Sounds more like UUI as she describes her leakage on the way to the bathroom. She's never tried an OAB med. Did not ever take the Myrbetric prescribed. She drinks 1.5 cups of coffeein the am. Takes fiber daily and has a bm every to every 3 days (hard in the beginning, sometimes uses a suppository and has to use gloves to empty her stool. Not getting uti's.      History of left partial nephrectomy in  for a benign mass on her kidney that was done by  in [place]. She does have a history of sepsis due to a kidney stone in 2016. She has not had any problems with stones since this time.   She denies dysuria. Admits to frequency and urgency. She has no hematuria. She wears the depends so she is unable to discern nocturia except that "I am soaked and sometimes I leak through at night". She is unable to get a urine sample at this time.     PAST MEDICAL HISTORY:  Past Medical History:   Diagnosis Date    Anticoagulant long-term use     Arthritis     CAD (coronary artery disease)     Cancer     skin    Cataract     CKD (chronic kidney disease), stage III     HEARING LOSS     Hematoma complicating a procedure 1213    ant abd wall    [place] (hard of hearing)     BILAT AIDS    " Hyperlipidemia     Hypertension     Meniere disease     Pneumonia     Retinal detachment     not sure which eye    Vertigo     Wears glasses        PAST SURGICAL HISTORY:  Past Surgical History:   Procedure Laterality Date    carotid surgery      left endarterectomy    CATARACT EXTRACTION      ou    CORONARY STENT PLACEMENT      x1    HYSTERECTOMY      PARTIAL NEPHRECTOMY  1013    benign left kidney neoplasm    RETINAL DETACHMENT SURGERY         SOCIAL HISTORY:  Social History     Socioeconomic History    Marital status:      Spouse name: Not on file    Number of children: Not on file    Years of education: Not on file    Highest education level: Not on file   Occupational History    Not on file   Social Needs    Financial resource strain: Not on file    Food insecurity:     Worry: Not on file     Inability: Not on file    Transportation needs:     Medical: Not on file     Non-medical: Not on file   Tobacco Use    Smoking status: Former Smoker    Smokeless tobacco: Never Used   Substance and Sexual Activity    Alcohol use: Yes     Comment: occasional    Drug use: No    Sexual activity: Not on file   Lifestyle    Physical activity:     Days per week: Not on file     Minutes per session: Not on file    Stress: Not on file   Relationships    Social connections:     Talks on phone: Not on file     Gets together: Not on file     Attends Latter day service: Not on file     Active member of club or organization: Not on file     Attends meetings of clubs or organizations: Not on file     Relationship status: Not on file   Other Topics Concern    Not on file   Social History Narrative    Not on file       FAMILY HISTORY:  Family History   Problem Relation Age of Onset    Cancer Father         ?    Heart failure Father     Amblyopia Neg Hx     Blindness Neg Hx     Cataracts Neg Hx     Diabetes Neg Hx     Glaucoma Neg Hx     Hypertension Neg Hx     Macular degeneration Neg Hx      Retinal detachment Neg Hx     Strabismus Neg Hx     Stroke Neg Hx     Thyroid disease Neg Hx        ALLERGIES AND MEDICATIONS: updated and reviewed.  Review of patient's allergies indicates:   Allergen Reactions    Codeine Other (See Comments)     dosent remember reaction;maybe nausea     Current Outpatient Medications   Medication Sig Dispense Refill    aspirin (ECOTRIN) 81 MG EC tablet Take 81 mg by mouth once daily.      diaper,brief,adult,disposable (ADULT BRIEFS - LARGE) Misc 1 each by Misc.(Non-Drug; Combo Route) route 5 (five) times daily. 300 each 11    diaper,brief,adult,disposable Misc       lisinopril (PRINIVIL,ZESTRIL) 40 MG tablet Take 1 tablet (40 mg total) by mouth nightly. Need office visit before next refill, last seen 11/2018. LAST Rx if visit is not made. 30 tablet 2    nitroGLYCERIN (NITRODUR) 0.1 mg/hr 1 patch once daily.       oxybutynin (DITROPAN-XL) 10 MG 24 hr tablet Take 1 tablet (10 mg total) by mouth once daily. 30 tablet 1     No current facility-administered medications for this visit.          ROS  Review of Systems   Constitutional: Positive for fatigue. Negative for activity change, chills and fever.   HENT: Negative for congestion and sore throat.    Respiratory: Negative for cough, shortness of breath and wheezing.    Cardiovascular: Negative for chest pain and palpitations.   Gastrointestinal: Negative for abdominal pain, nausea and vomiting.   Genitourinary: Positive for dysuria, frequency and urgency. Negative for decreased urine volume, difficulty urinating, flank pain, hematuria, pelvic pain and vaginal discharge.   Musculoskeletal: Positive for arthralgias, back pain and myalgias. Negative for gait problem.   Skin: Negative for color change, rash and wound.   Neurological: Negative for dizziness and syncope.   Psychiatric/Behavioral: Negative for agitation and dysphoric mood. The patient is not nervous/anxious.            PHYSICAL EXAM  Vitals:    10/03/19 1128  "  BP: (!) 156/76   Pulse: 61   Resp: 16   Temp: 98 °F (36.7 °C)   TempSrc: Oral   Weight: 88.1 kg (194 lb 3.6 oz)   Height: 5' 4" (1.626 m)    Body mass index is 33.34 kg/m².  Weight: 88.1 kg (194 lb 3.6 oz)   Height: 5' 4" (162.6 cm)       Physical Exam   Constitutional: She is oriented to person, place, and time. She appears well-developed and well-nourished.   HENT:   Head: Normocephalic.   Eyes: Pupils are equal, round, and reactive to light.   Neck: Normal range of motion.   Cardiovascular: Normal rate, S1 normal and S2 normal.   Pulmonary/Chest: Effort normal.   Abdominal: Soft. Normal appearance. She exhibits no distension. There is no tenderness.   Genitourinary:   Genitourinary Comments: + stress incontinence  No bulge or cystocele noted  Urinary opening appears patent   Neurological: She is alert and oriented to person, place, and time.   Skin: Skin is warm and dry. Capillary refill takes less than 2 seconds.   Psychiatric: She has a normal mood and affect. Her speech is normal and behavior is normal. Thought content normal. Cognition and memory are normal.   Vitals reviewed.        Health Maintenance       Date Due Completion Date    TETANUS VACCINE [birth year]/29/1951 ---    Shingles Vaccine (1 of 2) 11/29/1983 ---    DEXA SCAN 02/25/2017 2/25/2014    Influenza Vaccine (1) 09/01/2019 1/12/2010    Aspirin/Antiplatelet Therapy 10/03/2020 10/3/2019    Lipid Panel 11/08/2023 11/8/2018               Assessment & Plan    Yisel was seen today for urinary incontinence.    Diagnoses and all orders for this visit:    Urinary incontinence, unspecified type  -     oxybutynin (DITROPAN-XL) 10 MG 24 hr tablet; Take 1 tablet (10 mg total) by mouth once daily.  -     POCT urine dipstick without microscope        Follow-up: Follow up in about 4 weeks (around 10/31/2019).      Risks, benefits, and side effects were discussed with the patient. All questions were answered to the fullest satisfaction of the patient, and pt verbalized " understanding and agreement to treatment plan. Pt was to call with any new or worsening symptoms, or present to the ER.

## 2019-10-10 ENCOUNTER — OFFICE VISIT (OUTPATIENT)
Dept: FAMILY MEDICINE | Facility: CLINIC | Age: 84
End: 2019-10-10
Payer: MEDICARE

## 2019-10-10 VITALS
DIASTOLIC BLOOD PRESSURE: 84 MMHG | TEMPERATURE: 97 F | WEIGHT: 197.13 LBS | OXYGEN SATURATION: 97 % | BODY MASS INDEX: 33.66 KG/M2 | SYSTOLIC BLOOD PRESSURE: 148 MMHG | RESPIRATION RATE: 14 BRPM | HEIGHT: 64 IN | HEART RATE: 57 BPM

## 2019-10-10 DIAGNOSIS — I11.9 HYPERTENSIVE HEART DISEASE WITHOUT HEART FAILURE: ICD-10-CM

## 2019-10-10 DIAGNOSIS — R42 DIZZINESS: ICD-10-CM

## 2019-10-10 DIAGNOSIS — M19.90 ARTHRITIS: ICD-10-CM

## 2019-10-10 DIAGNOSIS — Z76.89 ENCOUNTER TO ESTABLISH CARE: Primary | ICD-10-CM

## 2019-10-10 DIAGNOSIS — Z78.0 ASYMPTOMATIC MENOPAUSAL STATE: ICD-10-CM

## 2019-10-10 PROCEDURE — 1101F PT FALLS ASSESS-DOCD LE1/YR: CPT | Mod: CPTII,S$GLB,, | Performed by: FAMILY MEDICINE

## 2019-10-10 PROCEDURE — 3079F PR MOST RECENT DIASTOLIC BLOOD PRESSURE 80-89 MM HG: ICD-10-PCS | Mod: CPTII,S$GLB,, | Performed by: FAMILY MEDICINE

## 2019-10-10 PROCEDURE — 99999 PR PBB SHADOW E&M-EST. PATIENT-LVL IV: CPT | Mod: PBBFAC,,, | Performed by: FAMILY MEDICINE

## 2019-10-10 PROCEDURE — 99214 OFFICE O/P EST MOD 30 MIN: CPT | Mod: S$GLB,,, | Performed by: FAMILY MEDICINE

## 2019-10-10 PROCEDURE — 99999 PR PBB SHADOW E&M-EST. PATIENT-LVL IV: ICD-10-PCS | Mod: PBBFAC,,, | Performed by: FAMILY MEDICINE

## 2019-10-10 PROCEDURE — 3079F DIAST BP 80-89 MM HG: CPT | Mod: CPTII,S$GLB,, | Performed by: FAMILY MEDICINE

## 2019-10-10 PROCEDURE — 99214 PR OFFICE/OUTPT VISIT, EST, LEVL IV, 30-39 MIN: ICD-10-PCS | Mod: S$GLB,,, | Performed by: FAMILY MEDICINE

## 2019-10-10 PROCEDURE — 3077F SYST BP >= 140 MM HG: CPT | Mod: CPTII,S$GLB,, | Performed by: FAMILY MEDICINE

## 2019-10-10 PROCEDURE — 3077F PR MOST RECENT SYSTOLIC BLOOD PRESSURE >= 140 MM HG: ICD-10-PCS | Mod: CPTII,S$GLB,, | Performed by: FAMILY MEDICINE

## 2019-10-10 PROCEDURE — 1101F PR PT FALLS ASSESS DOC 0-1 FALLS W/OUT INJ PAST YR: ICD-10-PCS | Mod: CPTII,S$GLB,, | Performed by: FAMILY MEDICINE

## 2019-10-10 RX ORDER — FLUTICASONE PROPIONATE 50 MCG
2 SPRAY, SUSPENSION (ML) NASAL DAILY
Qty: 48 G | Refills: 3 | Status: SHIPPED | OUTPATIENT
Start: 2019-10-10 | End: 2020-09-29

## 2019-10-10 NOTE — LETTER
October 10, 2019      Raphael Franklin MD  [address]  [place] [address]           [facility] [place] [address] [place] [address]  Phone: [phone]  Fax: [phone]          Patient: Yisel العلي   MR Number: [id number]   YOB: 1933   Date of Visit: 10/10/2019       Dear Dr. Raphael Franklin:    Thank you for referring Yisel العلي to me for evaluation. Attached you will find relevant portions of my assessment and plan of care.    If you have questions, please do not hesitate to call me. I look forward to following Yisel العلي along with you.    Sincerely,    DO Chirag Arguelles  CC:  No Recipients    If you would like to receive this communication electronically, please contact [email or web address] or [phone] to request more information on [employer] access.    For providers and/or their staff who would like to refer a patient to [facility], please contact us through our one-stop-shop provider referral line, [facility], at [phone].    If you feel you have received this communication in error or would no longer like to receive these types of communications, please e-mail [email or web address]

## 2019-10-10 NOTE — PROGRESS NOTES
Subjective:       Patient ID: Yisel العلي is a 85 y.o. female.    Chief Complaint: Establish Care    HPI   Ms. لاعلي presents to establish care. Medical and surgical histories reviewed and discussed. C/o chronic dizziness, and post nasal drip. Not currently taking anything for this.    Also complains of difficulty walking; has to use a cane. Feels like her legs get weak and she has some pain in her low back.     Health maintenance: due for dxa; mammo and colonoscopy no longer age-appropriate; reports she got her flu shot already this year    Review of Systems   Constitutional: Positive for fatigue. Negative for chills, diaphoresis and fever.   HENT: Positive for postnasal drip and sinus pressure. Negative for facial swelling and sore throat.    Cardiovascular: Negative for chest pain, palpitations and leg swelling.   Musculoskeletal: Positive for arthralgias, back pain and gait problem. Negative for neck pain.       Past Medical History:   Diagnosis Date    Anticoagulant long-term use     Arthritis     CAD (coronary artery disease)     Cancer     skin    Cataract     CKD (chronic kidney disease), stage III     HEARING LOSS     Hematoma complicating a procedure 1213    ant abd wall    [place] (hard of hearing)     BILAT AIDS    Hyperlipidemia     Hypertension     Meniere disease     Pneumonia     Retinal detachment     not sure which eye    Vertigo     Wears glasses      Past Surgical History:   Procedure Laterality Date    carotid surgery      left endarterectomy    CATARACT EXTRACTION      ou    CORONARY STENT PLACEMENT      x1    HYSTERECTOMY      PARTIAL NEPHRECTOMY  1013    benign left kidney neoplasm    RETINAL DETACHMENT SURGERY       Social History     Socioeconomic History    Marital status:      Spouse name: Not on file    Number of children: Not on file    Years of education: Not on file    Highest education level: Not on file   Occupational History    Not on file  "  Social Needs    Financial resource strain: Not on file    Food insecurity:     Worry: Not on file     Inability: Not on file    Transportation needs:     Medical: Not on file     Non-medical: Not on file   Tobacco Use    Smoking status: Former Smoker    Smokeless tobacco: Never Used   Substance and Sexual Activity    Alcohol use: Yes     Comment: occasional    Drug use: No    Sexual activity: Not on file   Lifestyle    Physical activity:     Days per week: Not on file     Minutes per session: Not on file    Stress: Not on file   Relationships    Social connections:     Talks on phone: Not on file     Gets together: Not on file     Attends Episcopalian service: Not on file     Active member of club or organization: Not on file     Attends meetings of clubs or organizations: Not on file     Relationship status: Not on file   Other Topics Concern    Not on file   Social History Narrative    Not on file     Family History   Problem Relation Age of Onset    Cancer Father         ?    Heart failure Father     Amblyopia Neg Hx     Blindness Neg Hx     Cataracts Neg Hx     Diabetes Neg Hx     Glaucoma Neg Hx     Hypertension Neg Hx     Macular degeneration Neg Hx     Retinal detachment Neg Hx     Strabismus Neg Hx     Stroke Neg Hx     Thyroid disease Neg Hx        Objective:      BP (!) 148/84 (BP Location: Left arm)   Pulse (!) 57   Temp 97.4 °F (36.3 °C) (Oral)   Resp 14   Ht 5' 4" (1.626 m)   Wt 89.4 kg (197 lb 1.6 oz)   SpO2 97%   BMI 33.83 kg/m²   Physical Exam   Constitutional: She appears well-developed and well-nourished. No distress.   Cardiovascular: Regular rhythm. Exam reveals no gallop and no friction rub.   Bradycardic rate   Pulmonary/Chest: Effort normal and breath sounds normal. No stridor. No respiratory distress.   Skin: Skin is warm and dry. No rash noted. She is not diaphoretic.   Vitals reviewed.      Assessment:       1. Encounter to establish care    2. Asymptomatic " menopausal state    3. Hypertensive heart disease without heart failure    4. Dizziness    5. Arthritis        Plan:       Encounter to establish care    Asymptomatic menopausal state  -     DXA Bone Density Spine And Hip; Future; Expected date: 10/10/2019    Hypertensive heart disease without heart failure  -     Lipid panel; Future; Expected date: 10/10/2019  -     Comprehensive metabolic panel; Future; Expected date: 10/10/2019  -     CBC auto differential; Future; Expected date: 10/10/2019    Dizziness  -     fluticasone propionate (FLONASE) 50 mcg/actuation nasal spray; 2 sprays (100 mcg total) by Each Nostril route once daily.  Dispense: 48 g; Refill: 3    Arthritis            Risks, benefits, and side effects were discussed with the patient. All questions were answered to the fullest satisfaction of the patient, and pt verbalized understanding and agreement to treatment plan. Pt was to call with any new or worsening symptoms, or present to the ER.

## 2019-10-15 ENCOUNTER — LAB VISIT (OUTPATIENT)
Dept: LAB | Facility: HOSPITAL | Age: 84
End: 2019-10-15
Attending: FAMILY MEDICINE
Payer: MEDICARE

## 2019-10-15 DIAGNOSIS — I11.9 HYPERTENSIVE HEART DISEASE WITHOUT HEART FAILURE: ICD-10-CM

## 2019-10-15 LAB
ALBUMIN SERPL BCP-MCNC: 4.1 G/DL (ref 3.5–5.2)
ALP SERPL-CCNC: 54 U/L (ref 55–135)
ALT SERPL W/O P-5'-P-CCNC: 18 U/L (ref 10–44)
ANION GAP SERPL CALC-SCNC: 11 MMOL/L (ref 8–16)
AST SERPL-CCNC: 20 U/L (ref 10–40)
BASOPHILS # BLD AUTO: 0.08 K/UL (ref 0–0.2)
BASOPHILS NFR BLD: 1.5 % (ref 0–1.9)
BILIRUB SERPL-MCNC: 0.7 MG/DL (ref 0.1–1)
BUN SERPL-MCNC: 18 MG/DL (ref 8–23)
CALCIUM SERPL-MCNC: 10.1 MG/DL (ref 8.7–10.5)
CHLORIDE SERPL-SCNC: 102 MMOL/L (ref 95–110)
CHOLEST SERPL-MCNC: 225 MG/DL (ref 120–199)
CHOLEST/HDLC SERPL: 5.5 {RATIO} (ref 2–5)
CO2 SERPL-SCNC: 22 MMOL/L (ref 23–29)
CREAT SERPL-MCNC: 0.9 MG/DL (ref 0.5–1.4)
DIFFERENTIAL METHOD: ABNORMAL
EOSINOPHIL # BLD AUTO: 0.3 K/UL (ref 0–0.5)
EOSINOPHIL NFR BLD: 6.4 % (ref 0–8)
ERYTHROCYTE [DISTWIDTH] IN BLOOD BY AUTOMATED COUNT: 11.9 % (ref 11.5–14.5)
EST. GFR  (AFRICAN AMERICAN): >60 ML/MIN/1.73 M^2
EST. GFR  (NON AFRICAN AMERICAN): 58.5 ML/MIN/1.73 M^2
GLUCOSE SERPL-MCNC: 126 MG/DL (ref 70–110)
HCT VFR BLD AUTO: 39.8 % (ref 37–48.5)
HDLC SERPL-MCNC: 41 MG/DL (ref 40–75)
HDLC SERPL: 18.2 % (ref 20–50)
HGB BLD-MCNC: 13.6 G/DL (ref 12–16)
IMM GRANULOCYTES # BLD AUTO: 0.04 K/UL (ref 0–0.04)
IMM GRANULOCYTES NFR BLD AUTO: 0.7 % (ref 0–0.5)
LDLC SERPL CALC-MCNC: 135.2 MG/DL (ref 63–159)
LYMPHOCYTES # BLD AUTO: 1.7 K/UL (ref 1–4.8)
LYMPHOCYTES NFR BLD: 31.8 % (ref 18–48)
MCH RBC QN AUTO: 31.6 PG (ref 27–31)
MCHC RBC AUTO-ENTMCNC: 34.2 G/DL (ref 32–36)
MCV RBC AUTO: 93 FL (ref 82–98)
MONOCYTES # BLD AUTO: 0.5 K/UL (ref 0.3–1)
MONOCYTES NFR BLD: 8.4 % (ref 4–15)
NEUTROPHILS # BLD AUTO: 2.7 K/UL (ref 1.8–7.7)
NEUTROPHILS NFR BLD: 51.2 % (ref 38–73)
NONHDLC SERPL-MCNC: 184 MG/DL
NRBC BLD-RTO: 0 /100 WBC
PLATELET # BLD AUTO: 323 K/UL (ref 150–350)
PMV BLD AUTO: 10.9 FL (ref 9.2–12.9)
POTASSIUM SERPL-SCNC: 4.4 MMOL/L (ref 3.5–5.1)
PROT SERPL-MCNC: 6.8 G/DL (ref 6–8.4)
RBC # BLD AUTO: 4.3 M/UL (ref 4–5.4)
SODIUM SERPL-SCNC: 135 MMOL/L (ref 136–145)
TRIGL SERPL-MCNC: 244 MG/DL (ref 30–150)
WBC # BLD AUTO: 5.34 K/UL (ref 3.9–12.7)

## 2019-10-15 PROCEDURE — 80061 LIPID PANEL: CPT

## 2019-10-15 PROCEDURE — 36415 COLL VENOUS BLD VENIPUNCTURE: CPT

## 2019-10-15 PROCEDURE — 85025 COMPLETE CBC W/AUTO DIFF WBC: CPT

## 2019-10-15 PROCEDURE — 80053 COMPREHEN METABOLIC PANEL: CPT

## 2019-10-17 ENCOUNTER — HOSPITAL ENCOUNTER (OUTPATIENT)
Dept: RADIOLOGY | Facility: HOSPITAL | Age: 84
Discharge: HOME | End: 2019-10-17
Attending: FAMILY MEDICINE
Payer: MEDICARE

## 2019-10-17 DIAGNOSIS — Z78.0 ASYMPTOMATIC MENOPAUSAL STATE: ICD-10-CM

## 2019-10-17 PROCEDURE — 77080 DXA BONE DENSITY AXIAL: CPT | Mod: 26,,, | Performed by: RADIOLOGY

## 2019-10-17 PROCEDURE — 77080 DXA BONE DENSITY AXIAL: CPT | Mod: TC

## 2019-10-17 PROCEDURE — 77080 DEXA BONE DENSITY SPINE HIP: ICD-10-PCS | Mod: 26,,, | Performed by: RADIOLOGY

## 2019-10-25 DIAGNOSIS — R32 URINARY INCONTINENCE, UNSPECIFIED TYPE: ICD-10-CM

## 2019-10-25 RX ORDER — OXYBUTYNIN CHLORIDE 10 MG/1
TABLET, EXTENDED RELEASE ORAL
Qty: 30 TABLET | Refills: 1 | Status: SHIPPED | OUTPATIENT
Start: 2019-10-25 | End: 2019-11-22 | Stop reason: SDUPTHER

## 2019-10-29 ENCOUNTER — PATIENT OUTREACH (OUTPATIENT)
Dept: ADMINISTRATIVE | Facility: HOSPITAL | Age: 84
End: 2019-10-29

## 2019-11-12 ENCOUNTER — OFFICE VISIT (OUTPATIENT)
Dept: FAMILY MEDICINE | Facility: CLINIC | Age: 84
End: 2019-11-12
Payer: MEDICARE

## 2019-11-12 VITALS
OXYGEN SATURATION: 96 % | SYSTOLIC BLOOD PRESSURE: 128 MMHG | BODY MASS INDEX: 32.56 KG/M2 | RESPIRATION RATE: 18 BRPM | HEIGHT: 64 IN | DIASTOLIC BLOOD PRESSURE: 53 MMHG | HEART RATE: 60 BPM | WEIGHT: 190.69 LBS

## 2019-11-12 DIAGNOSIS — F41.8 SITUATIONAL ANXIETY: ICD-10-CM

## 2019-11-12 DIAGNOSIS — J01.00 ACUTE NON-RECURRENT MAXILLARY SINUSITIS: ICD-10-CM

## 2019-11-12 DIAGNOSIS — M85.80 OSTEOPENIA, UNSPECIFIED LOCATION: Primary | ICD-10-CM

## 2019-11-12 DIAGNOSIS — E78.2 ELEVATED TRIGLYCERIDES WITH HIGH CHOLESTEROL: ICD-10-CM

## 2019-11-12 DIAGNOSIS — R73.01 FASTING HYPERGLYCEMIA: ICD-10-CM

## 2019-11-12 DIAGNOSIS — T75.3XXS MOTION SICKNESS, SEQUELA: ICD-10-CM

## 2019-11-12 PROCEDURE — 99999 PR PBB SHADOW E&M-EST. PATIENT-LVL III: CPT | Mod: PBBFAC,,, | Performed by: FAMILY MEDICINE

## 2019-11-12 PROCEDURE — 3078F PR MOST RECENT DIASTOLIC BLOOD PRESSURE < 80 MM HG: ICD-10-PCS | Mod: CPTII,S$GLB,, | Performed by: FAMILY MEDICINE

## 2019-11-12 PROCEDURE — 99999 PR PBB SHADOW E&M-EST. PATIENT-LVL III: ICD-10-PCS | Mod: PBBFAC,,, | Performed by: FAMILY MEDICINE

## 2019-11-12 PROCEDURE — 99214 OFFICE O/P EST MOD 30 MIN: CPT | Mod: S$GLB,,, | Performed by: FAMILY MEDICINE

## 2019-11-12 PROCEDURE — 1101F PT FALLS ASSESS-DOCD LE1/YR: CPT | Mod: CPTII,S$GLB,, | Performed by: FAMILY MEDICINE

## 2019-11-12 PROCEDURE — 1101F PR PT FALLS ASSESS DOC 0-1 FALLS W/OUT INJ PAST YR: ICD-10-PCS | Mod: CPTII,S$GLB,, | Performed by: FAMILY MEDICINE

## 2019-11-12 PROCEDURE — 99214 PR OFFICE/OUTPT VISIT, EST, LEVL IV, 30-39 MIN: ICD-10-PCS | Mod: S$GLB,,, | Performed by: FAMILY MEDICINE

## 2019-11-12 PROCEDURE — 3074F SYST BP LT 130 MM HG: CPT | Mod: CPTII,S$GLB,, | Performed by: FAMILY MEDICINE

## 2019-11-12 PROCEDURE — 3078F DIAST BP <80 MM HG: CPT | Mod: CPTII,S$GLB,, | Performed by: FAMILY MEDICINE

## 2019-11-12 PROCEDURE — 3074F PR MOST RECENT SYSTOLIC BLOOD PRESSURE < 130 MM HG: ICD-10-PCS | Mod: CPTII,S$GLB,, | Performed by: FAMILY MEDICINE

## 2019-11-12 RX ORDER — AMOXICILLIN 500 MG/1
500 TABLET, FILM COATED ORAL EVERY 12 HOURS
Qty: 20 TABLET | Refills: 0 | Status: SHIPPED | OUTPATIENT
Start: 2019-11-12 | End: 2019-11-22

## 2019-11-12 RX ORDER — MECLIZINE HCL 12.5 MG 12.5 MG/1
12.5 TABLET ORAL 2 TIMES DAILY PRN
Qty: 40 TABLET | Refills: 2 | Status: SHIPPED | OUTPATIENT
Start: 2019-11-12 | End: 2020-09-29

## 2019-11-12 RX ORDER — KETOCONAZOLE 20 MG/ML
SHAMPOO, SUSPENSION TOPICAL
Refills: 11 | COMMUNITY
Start: 2019-10-15 | End: 2020-09-29 | Stop reason: SDUPTHER

## 2019-11-12 RX ORDER — DIAZEPAM 2 MG/1
2 TABLET ORAL DAILY PRN
Qty: 30 TABLET | Refills: 0 | Status: SHIPPED | OUTPATIENT
Start: 2019-11-12 | End: 2020-06-09 | Stop reason: SDUPTHER

## 2019-11-12 RX ORDER — AMMONIUM LACTATE 12 G/100G
LOTION TOPICAL
COMMUNITY
Start: 2019-10-30 | End: 2023-06-29

## 2019-11-12 RX ORDER — NYSTATIN AND TRIAMCINOLONE ACETONIDE 100000; 1 [USP'U]/G; MG/G
OINTMENT TOPICAL
Refills: 2 | COMMUNITY
Start: 2019-10-21 | End: 2020-09-29 | Stop reason: SDUPTHER

## 2019-11-12 NOTE — PATIENT INSTRUCTIONS
Start taking krill oil every day to help lower your triglycerides. Let's recheck some fasting bloodwork in 2 months and see you for follow-up 1 week later.    Continue your pool exercises, as this is good for your circulation and your bone density.

## 2019-11-12 NOTE — PROGRESS NOTES
Subjective:       Patient ID: Yisel العلي is a 85 y.o. female.    Chief Complaint: Follow-up (discuss medications) and Anxiety    HPI   Follow-up and test review. Bone density scan shows osteopenia, improved from prior scan. Labs show fasting hyperglycemia and elevated triglycerides. Other labs within normal limits.     C/o occasional situational anxiety. Valium has helped in the past and she did not have any side effects.    Dizziness did not improve with flonase. Also coughing more and having sinus pressure.    Health maintenance discussed and is up to date.    Review of Systems   Constitutional: Negative for chills, diaphoresis, fatigue and fever.   HENT: Positive for congestion and sinus pressure. Negative for ear discharge and sore throat.    Respiratory: Positive for cough. Negative for shortness of breath, wheezing and stridor.    Cardiovascular: Negative for chest pain, palpitations and leg swelling.       Past Medical History:   Diagnosis Date    Anticoagulant long-term use     Arthritis     CAD (coronary artery disease)     Cancer     skin    Cataract     CKD (chronic kidney disease), stage III     HEARING LOSS     Hematoma complicating a procedure 1213    ant abd wall    [place] (hard of hearing)     BILAT AIDS    Hyperlipidemia     Hypertension     Meniere disease     Pneumonia     Retinal detachment     not sure which eye    Vertigo     Wears glasses      Past Surgical History:   Procedure Laterality Date    carotid surgery      left endarterectomy    CATARACT EXTRACTION      ou    CORONARY STENT PLACEMENT      x1    HYSTERECTOMY      PARTIAL NEPHRECTOMY  1013    benign left kidney neoplasm    RETINAL DETACHMENT SURGERY       Social History     Socioeconomic History    Marital status:      Spouse name: Not on file    Number of children: Not on file    Years of education: Not on file    Highest education level: Not on file   Occupational History    Not on file   Social  "Needs    Financial resource strain: Not on file    Food insecurity:     Worry: Not on file     Inability: Not on file    Transportation needs:     Medical: Not on file     Non-medical: Not on file   Tobacco Use    Smoking status: Former Smoker    Smokeless tobacco: Never Used   Substance and Sexual Activity    Alcohol use: Yes     Comment: occasional    Drug use: No    Sexual activity: Not on file   Lifestyle    Physical activity:     Days per week: Not on file     Minutes per session: Not on file    Stress: Not on file   Relationships    Social connections:     Talks on phone: Not on file     Gets together: Not on file     Attends Taoism service: Not on file     Active member of club or organization: Not on file     Attends meetings of clubs or organizations: Not on file     Relationship status: Not on file   Other Topics Concern    Not on file   Social History Narrative    Not on file     Family History   Problem Relation Age of Onset    Cancer Father         ?    Heart failure Father     Amblyopia Neg Hx     Blindness Neg Hx     Cataracts Neg Hx     Diabetes Neg Hx     Glaucoma Neg Hx     Hypertension Neg Hx     Macular degeneration Neg Hx     Retinal detachment Neg Hx     Strabismus Neg Hx     Stroke Neg Hx     Thyroid disease Neg Hx        Objective:      BP (!) 128/53 (BP Location: Left arm, Patient Position: Sitting, BP Method: Medium (Automatic))   Pulse 60   Resp 18   Ht 5' 4" (1.626 m)   Wt 86.5 kg (190 lb 11.2 oz)   SpO2 96%   BMI 32.73 kg/m²   Physical Exam   Constitutional: She appears well-developed and well-nourished. No distress.   Eyes: Pupils are equal, round, and reactive to light. Conjunctivae and EOM are normal. No scleral icterus.   Cardiovascular: Normal rate and regular rhythm. Exam reveals no friction rub.   Murmur heard.  Pulmonary/Chest: Effort normal and breath sounds normal. No stridor. No respiratory distress.   Skin: Skin is warm and dry. No rash " noted. She is not diaphoretic.   Vitals reviewed.      Assessment:       1. Osteopenia, unspecified location    2. Fasting hyperglycemia    3. Elevated triglycerides with high cholesterol    4. Situational anxiety    5. Motion sickness, sequela    6. Acute non-recurrent maxillary sinusitis        Plan:       Osteopenia, unspecified location  - improved; continue water aerobics    Fasting hyperglycemia  -     Avoid sugar and carbohydrates; repeat labs in 2 months  -     Comprehensive metabolic panel; Future; Expected date: 11/12/2019  -     Hemoglobin A1c; Future; Expected date: 11/12/2019  -     INSULIN, RANDOM; Future; Expected date: 11/12/2019    Elevated triglycerides with high cholesterol  -     Resume omega 3 supplement; repeat triglycerides in 2 months  -     Lipid panel; Future; Expected date: 11/12/2019    Situational anxiety  -     diazePAM (VALIUM) 2 MG tablet; Take 1 tablet (2 mg total) by mouth daily as needed for Anxiety.  Dispense: 30 tablet; Refill: 0    Motion sickness, sequela  -     meclizine (ANTIVERT) 12.5 mg tablet; Take 1 tablet (12.5 mg total) by mouth 2 (two) times daily as needed for Dizziness.  Dispense: 40 tablet; Refill: 2    Acute non-recurrent maxillary sinusitis  -     amoxicillin (AMOXIL) 500 MG Tab; Take 1 tablet (500 mg total) by mouth every 12 (twelve) hours. for 10 days  Dispense: 20 tablet; Refill: 0            Risks, benefits, and side effects were discussed with the patient. All questions were answered to the fullest satisfaction of the patient, and pt verbalized understanding and agreement to treatment plan. Pt was to call with any new or worsening symptoms, or present to the ER.

## 2019-11-22 DIAGNOSIS — R32 URINARY INCONTINENCE, UNSPECIFIED TYPE: ICD-10-CM

## 2019-11-22 RX ORDER — OXYBUTYNIN CHLORIDE 10 MG/1
TABLET, EXTENDED RELEASE ORAL
Qty: 30 TABLET | Refills: 1 | Status: SHIPPED | OUTPATIENT
Start: 2019-11-22 | End: 2019-12-15 | Stop reason: SDUPTHER

## 2019-12-15 DIAGNOSIS — R32 URINARY INCONTINENCE, UNSPECIFIED TYPE: ICD-10-CM

## 2019-12-16 RX ORDER — OXYBUTYNIN CHLORIDE 10 MG/1
TABLET, EXTENDED RELEASE ORAL
Qty: 90 TABLET | Refills: 1 | Status: SHIPPED | OUTPATIENT
Start: 2019-12-16 | End: 2020-03-02

## 2019-12-18 ENCOUNTER — TELEPHONE (OUTPATIENT)
Dept: FAMILY MEDICINE | Facility: CLINIC | Age: 84
End: 2019-12-18

## 2019-12-18 DIAGNOSIS — Z20.828 EXPOSURE TO INFLUENZA: Primary | ICD-10-CM

## 2019-12-18 RX ORDER — LISINOPRIL 40 MG/1
40 TABLET ORAL NIGHTLY
Qty: 90 TABLET | Refills: 2 | Status: SHIPPED | OUTPATIENT
Start: 2019-12-18 | End: 2019-12-19 | Stop reason: SDUPTHER

## 2019-12-18 NOTE — TELEPHONE ENCOUNTER
Please advise    ----- Message from Penny De La Torre sent at 12/18/2019 10:45 AM CST -----  Contact: patient  Type: Needs Medical Advice    Who Called:  patient  Pharmacy name and phone #:    [id number] - [place] - [address]  [address] [place] [address]  Phone: [phone] Fax: [phone]  Best Call Back Number: [phone]  Additional Information: patient states that she will be traveling and would to have a prescription sent to the pharmacy for tamiflu. Please give call back

## 2019-12-18 NOTE — TELEPHONE ENCOUNTER
----- Message from Penny De La Torre sent at 12/18/2019 10:48 AM CST -----  Contact: patient  Type:  RX Refill Request    Who Called:  patient  Refill or New Rx:  refill  RX Name and Strength:  lisinopril (PRINIVIL,ZESTRIL) 40 MG tablet  How is the patient currently taking it? (ex. 1XDay):  1XDAY  Is this a 30 day or 90 day RX:    Preferred Pharmacy with phone number:    [id number] - [place], [place] - [address]  [address] [place] [address]  Phone: [phone] Fax: [phone]      Local or Mail Order:  local  Ordering Provider:  Rigoberto  Best Call Back Number:  [phone]  Additional Information:

## 2019-12-19 ENCOUNTER — OFFICE VISIT (OUTPATIENT)
Dept: FAMILY MEDICINE | Facility: CLINIC | Age: 84
End: 2019-12-19
Payer: MEDICARE

## 2019-12-19 VITALS
SYSTOLIC BLOOD PRESSURE: 175 MMHG | HEIGHT: 64 IN | RESPIRATION RATE: 19 BRPM | OXYGEN SATURATION: 99 % | BODY MASS INDEX: 32.44 KG/M2 | HEART RATE: 79 BPM | WEIGHT: 190 LBS | DIASTOLIC BLOOD PRESSURE: 76 MMHG

## 2019-12-19 DIAGNOSIS — I11.9 HYPERTENSIVE HEART DISEASE WITHOUT HEART FAILURE: Primary | ICD-10-CM

## 2019-12-19 PROCEDURE — 1159F PR MEDICATION LIST DOCUMENTED IN MEDICAL RECORD: ICD-10-PCS | Mod: S$GLB,,, | Performed by: NURSE PRACTITIONER

## 2019-12-19 PROCEDURE — 99213 OFFICE O/P EST LOW 20 MIN: CPT | Mod: S$GLB,,, | Performed by: NURSE PRACTITIONER

## 2019-12-19 PROCEDURE — 99999 PR PBB SHADOW E&M-EST. PATIENT-LVL III: ICD-10-PCS | Mod: PBBFAC,,, | Performed by: NURSE PRACTITIONER

## 2019-12-19 PROCEDURE — 1126F AMNT PAIN NOTED NONE PRSNT: CPT | Mod: S$GLB,,, | Performed by: NURSE PRACTITIONER

## 2019-12-19 PROCEDURE — 99213 PR OFFICE/OUTPT VISIT, EST, LEVL III, 20-29 MIN: ICD-10-PCS | Mod: S$GLB,,, | Performed by: NURSE PRACTITIONER

## 2019-12-19 PROCEDURE — 99999 PR PBB SHADOW E&M-EST. PATIENT-LVL III: CPT | Mod: PBBFAC,,, | Performed by: NURSE PRACTITIONER

## 2019-12-19 PROCEDURE — 1101F PR PT FALLS ASSESS DOC 0-1 FALLS W/OUT INJ PAST YR: ICD-10-PCS | Mod: CPTII,S$GLB,, | Performed by: NURSE PRACTITIONER

## 2019-12-19 PROCEDURE — 1159F MED LIST DOCD IN RCRD: CPT | Mod: S$GLB,,, | Performed by: NURSE PRACTITIONER

## 2019-12-19 PROCEDURE — 1101F PT FALLS ASSESS-DOCD LE1/YR: CPT | Mod: CPTII,S$GLB,, | Performed by: NURSE PRACTITIONER

## 2019-12-19 PROCEDURE — 1126F PR PAIN SEVERITY QUANTIFIED, NO PAIN PRESENT: ICD-10-PCS | Mod: S$GLB,,, | Performed by: NURSE PRACTITIONER

## 2019-12-19 RX ORDER — HYDROCHLOROTHIAZIDE 12.5 MG/1
12.5 TABLET ORAL DAILY PRN
Qty: 30 TABLET | Refills: 11 | Status: SHIPPED | OUTPATIENT
Start: 2019-12-19 | End: 2020-07-17

## 2019-12-19 RX ORDER — OSELTAMIVIR PHOSPHATE 75 MG/1
75 CAPSULE ORAL DAILY
Qty: 7 CAPSULE | Refills: 0 | Status: SHIPPED | OUTPATIENT
Start: 2019-12-19 | End: 2019-12-26

## 2019-12-19 RX ORDER — AMLODIPINE BESYLATE 5 MG/1
5 TABLET ORAL DAILY
Qty: 30 TABLET | Refills: 11 | Status: SHIPPED | OUTPATIENT
Start: 2019-12-19 | End: 2020-07-17

## 2019-12-19 RX ORDER — LISINOPRIL 40 MG/1
40 TABLET ORAL NIGHTLY
Qty: 90 TABLET | Refills: 2 | Status: SHIPPED | OUTPATIENT
Start: 2019-12-19 | End: 2020-03-10 | Stop reason: SDUPTHER

## 2019-12-19 NOTE — PROGRESS NOTES
"Subjective:       Patient ID: Yisel العلي is a 86 y.o. female.    Chief Complaint: Hypertension    Ms. Yisel العلي is a 86 year old female who presents to the clinic today for blood pressure check. She is a walk in. She is leaving for vacation. Reports the last 2 days, she has checked her bp because she felt "strange" and reports elevated BP readings. Reports swelling in her legs often. Currently, she is taking 40 mg lisinopril daily. She is compliant. She reports headache, dizziness today. She feels "off." Denies cp or sob.     Review of Systems   Constitutional: Positive for activity change. Negative for chills, diaphoresis, fatigue and fever.   HENT: Negative for congestion, ear discharge, sinus pressure and sore throat.    Respiratory: Negative for apnea, cough, chest tightness, shortness of breath, wheezing and stridor.    Cardiovascular: Negative for chest pain, palpitations and leg swelling.   Gastrointestinal: Negative for abdominal distention, abdominal pain, constipation, diarrhea and vomiting.   Musculoskeletal: Positive for gait problem. Negative for arthralgias and myalgias.   Neurological: Positive for dizziness and headaches. Negative for tremors, syncope and weakness.         Reviewed family, medical, surgical, and social history.    Objective:      BP (!) 175/76 (BP Location: Right arm, Patient Position: Sitting, BP Method: Medium (Automatic))   Pulse 79   Resp 19   Ht 5' 4" (1.626 m)   Wt 86.2 kg (190 lb)   SpO2 99%   BMI 32.61 kg/m²   Physical Exam   Constitutional: She is oriented to person, place, and time. She appears well-developed.   HENT:   Head: Normocephalic.   Right Ear: External ear normal.   Left Ear: External ear normal.   Nose: Nose normal.   Mouth/Throat: Oropharynx is clear and moist.   Eyes: Pupils are equal, round, and reactive to light. Conjunctivae are normal.   Neck: Normal range of motion.   Cardiovascular: Normal rate, regular rhythm, normal heart sounds and " intact distal pulses.   No murmur heard.  Pulmonary/Chest: Effort normal and breath sounds normal. She has no wheezes.   Abdominal: Soft. Bowel sounds are normal. There is no guarding.   Musculoskeletal: Normal range of motion.   Neurological: She is alert and oriented to person, place, and time.   Skin: Skin is warm. Capillary refill takes less than 2 seconds. No rash noted. No erythema.   Psychiatric: She has a normal mood and affect. Her behavior is normal. Judgment and thought content normal.       Assessment:       1. Hypertensive heart disease without heart failure        Plan:       Hypertensive heart disease without heart failure  -     lisinopril (PRINIVIL,ZESTRIL) 40 MG tablet; Take 1 tablet (40 mg total) by mouth nightly.  Dispense: 90 tablet; Refill: 2  -     amLODIPine (NORVASC) 5 MG tablet; Take 1 tablet (5 mg total) by mouth once daily.  Dispense: 30 tablet; Refill: 11  -     hydroCHLOROthiazide (HYDRODIURIL) 12.5 MG Tab; Take 1 tablet (12.5 mg total) by mouth daily as needed.  Dispense: 30 tablet; Refill: 11          PLAN:  - Discussed with patient the plan of care  - continue to monitor BP  - monitor swelling  - start new medications, monitor BP closely  - Medications reviewed. Medication side effects discussed. Patient has no questions or concerns at this time. Informed patient to notify me regarding any concerns.   - Informed patient to please notify me with any questions or concerns at anytime  - Follow up ordered for 2-4 weeks      Risks, benefits, and side effects were discussed with the patient. All questions were answered to the fullest satisfaction of the patient, and pt verbalized understanding and agreement to treatment plan. Pt was to call with any new or worsening symptoms, or present to the ER.

## 2020-01-07 ENCOUNTER — LAB VISIT (OUTPATIENT)
Dept: LAB | Facility: HOSPITAL | Age: 85
End: 2020-01-07
Attending: FAMILY MEDICINE
Payer: MEDICARE

## 2020-01-07 DIAGNOSIS — E78.2 ELEVATED TRIGLYCERIDES WITH HIGH CHOLESTEROL: ICD-10-CM

## 2020-01-07 DIAGNOSIS — R73.01 FASTING HYPERGLYCEMIA: ICD-10-CM

## 2020-01-07 LAB
ALBUMIN SERPL BCP-MCNC: 4.6 G/DL (ref 3.5–5.2)
ALP SERPL-CCNC: 48 U/L (ref 55–135)
ALT SERPL W/O P-5'-P-CCNC: 16 U/L (ref 10–44)
ANION GAP SERPL CALC-SCNC: 5 MMOL/L (ref 8–16)
AST SERPL-CCNC: 23 U/L (ref 10–40)
BILIRUB SERPL-MCNC: 0.6 MG/DL (ref 0.1–1)
BUN SERPL-MCNC: 27 MG/DL (ref 8–23)
CALCIUM SERPL-MCNC: 10.6 MG/DL (ref 8.7–10.5)
CHLORIDE SERPL-SCNC: 101 MMOL/L (ref 95–110)
CHOLEST SERPL-MCNC: 245 MG/DL (ref 120–199)
CHOLEST/HDLC SERPL: 4.8 {RATIO} (ref 2–5)
CO2 SERPL-SCNC: 30 MMOL/L (ref 23–29)
CREAT SERPL-MCNC: 1 MG/DL (ref 0.5–1.4)
EST. GFR  (AFRICAN AMERICAN): 58.9 ML/MIN/1.73 M^2
EST. GFR  (NON AFRICAN AMERICAN): 51.1 ML/MIN/1.73 M^2
ESTIMATED AVG GLUCOSE: 114 MG/DL (ref 68–131)
GLUCOSE SERPL-MCNC: 106 MG/DL (ref 70–110)
HBA1C MFR BLD HPLC: 5.6 % (ref 4.5–6.2)
HDLC SERPL-MCNC: 51 MG/DL (ref 40–75)
HDLC SERPL: 20.8 % (ref 20–50)
LDLC SERPL CALC-MCNC: 160.2 MG/DL (ref 63–159)
NONHDLC SERPL-MCNC: 194 MG/DL
POTASSIUM SERPL-SCNC: 4.3 MMOL/L (ref 3.5–5.1)
PROT SERPL-MCNC: 7.7 G/DL (ref 6–8.4)
SODIUM SERPL-SCNC: 136 MMOL/L (ref 136–145)
TRIGL SERPL-MCNC: 169 MG/DL (ref 30–150)

## 2020-01-07 PROCEDURE — 83525 ASSAY OF INSULIN: CPT

## 2020-01-07 PROCEDURE — 80053 COMPREHEN METABOLIC PANEL: CPT

## 2020-01-07 PROCEDURE — 80061 LIPID PANEL: CPT

## 2020-01-07 PROCEDURE — 83036 HEMOGLOBIN GLYCOSYLATED A1C: CPT

## 2020-01-07 PROCEDURE — 36415 COLL VENOUS BLD VENIPUNCTURE: CPT

## 2020-01-08 LAB
INSULIN COLLECTION INTERVAL: NORMAL
INSULIN SERPL-ACNC: 15.3 UU/ML

## 2020-01-14 ENCOUNTER — OFFICE VISIT (OUTPATIENT)
Dept: FAMILY MEDICINE | Facility: CLINIC | Age: 85
End: 2020-01-14
Payer: MEDICARE

## 2020-01-14 VITALS
HEIGHT: 64 IN | HEART RATE: 64 BPM | RESPIRATION RATE: 16 BRPM | OXYGEN SATURATION: 97 % | DIASTOLIC BLOOD PRESSURE: 61 MMHG | WEIGHT: 191.31 LBS | SYSTOLIC BLOOD PRESSURE: 134 MMHG | BODY MASS INDEX: 32.66 KG/M2

## 2020-01-14 DIAGNOSIS — I10 HTN (HYPERTENSION), BENIGN: Primary | ICD-10-CM

## 2020-01-14 DIAGNOSIS — E55.9 VITAMIN D DEFICIENCY: ICD-10-CM

## 2020-01-14 DIAGNOSIS — N18.30 CKD (CHRONIC KIDNEY DISEASE), STAGE III: ICD-10-CM

## 2020-01-14 PROCEDURE — 1101F PR PT FALLS ASSESS DOC 0-1 FALLS W/OUT INJ PAST YR: ICD-10-PCS | Mod: CPTII,S$GLB,, | Performed by: FAMILY MEDICINE

## 2020-01-14 PROCEDURE — 99213 PR OFFICE/OUTPT VISIT, EST, LEVL III, 20-29 MIN: ICD-10-PCS | Mod: S$GLB,,, | Performed by: FAMILY MEDICINE

## 2020-01-14 PROCEDURE — 1101F PT FALLS ASSESS-DOCD LE1/YR: CPT | Mod: CPTII,S$GLB,, | Performed by: FAMILY MEDICINE

## 2020-01-14 PROCEDURE — 1159F MED LIST DOCD IN RCRD: CPT | Mod: S$GLB,,, | Performed by: FAMILY MEDICINE

## 2020-01-14 PROCEDURE — 99999 PR PBB SHADOW E&M-EST. PATIENT-LVL III: ICD-10-PCS | Mod: PBBFAC,,, | Performed by: FAMILY MEDICINE

## 2020-01-14 PROCEDURE — 1159F PR MEDICATION LIST DOCUMENTED IN MEDICAL RECORD: ICD-10-PCS | Mod: S$GLB,,, | Performed by: FAMILY MEDICINE

## 2020-01-14 PROCEDURE — 99999 PR PBB SHADOW E&M-EST. PATIENT-LVL III: CPT | Mod: PBBFAC,,, | Performed by: FAMILY MEDICINE

## 2020-01-14 PROCEDURE — 99213 OFFICE O/P EST LOW 20 MIN: CPT | Mod: S$GLB,,, | Performed by: FAMILY MEDICINE

## 2020-01-14 NOTE — PROGRESS NOTES
Subjective:       Patient ID: Yisel العلي is a 86 y.o. female.    Chief Complaint: Follow-up (BW review)    HPI   Follow-up and lab review. GFR slightly lower. Blood pressure is improved. Triglycerides have improved with fish oil. Patient has no new complaints at this time; no med refills needed.    Review of Systems   Constitutional: Negative for chills, diaphoresis, fatigue and fever.   Respiratory: Negative for cough, shortness of breath, wheezing and stridor.    Cardiovascular: Negative for chest pain, palpitations and leg swelling.       Past Medical History:   Diagnosis Date    Anticoagulant long-term use     Arthritis     CAD (coronary artery disease)     Cancer     skin    Cataract     CKD (chronic kidney disease), stage III     HEARING LOSS     Hematoma complicating a procedure 1213    ant abd wall    [place] (hard of hearing)     BILAT AIDS    Hyperlipidemia     Hypertension     Meniere disease     Pneumonia     Retinal detachment     not sure which eye    Vertigo     Wears glasses      Past Surgical History:   Procedure Laterality Date    carotid surgery      left endarterectomy    CATARACT EXTRACTION      ou    CORONARY STENT PLACEMENT      x1    HYSTERECTOMY      PARTIAL NEPHRECTOMY  1013    benign left kidney neoplasm    RETINAL DETACHMENT SURGERY       Social History     Socioeconomic History    Marital status:      Spouse name: Not on file    Number of children: Not on file    Years of education: Not on file    Highest education level: Not on file   Occupational History    Not on file   Social Needs    Financial resource strain: Not on file    Food insecurity:     Worry: Not on file     Inability: Not on file    Transportation needs:     Medical: Not on file     Non-medical: Not on file   Tobacco Use    Smoking status: Former Smoker    Smokeless tobacco: Never Used   Substance and Sexual Activity    Alcohol use: Yes     Comment: occasional    Drug use: No  "   Sexual activity: Not on file   Lifestyle    Physical activity:     Days per week: Not on file     Minutes per session: Not on file    Stress: Not on file   Relationships    Social connections:     Talks on phone: Not on file     Gets together: Not on file     Attends Islam service: Not on file     Active member of club or organization: Not on file     Attends meetings of clubs or organizations: Not on file     Relationship status: Not on file   Other Topics Concern    Not on file   Social History Narrative    Not on file     Family History   Problem Relation Age of Onset    Cancer Father         ?    Heart failure Father     Amblyopia Neg Hx     Blindness Neg Hx     Cataracts Neg Hx     Diabetes Neg Hx     Glaucoma Neg Hx     Hypertension Neg Hx     Macular degeneration Neg Hx     Retinal detachment Neg Hx     Strabismus Neg Hx     Stroke Neg Hx     Thyroid disease Neg Hx        Objective:      /61 (BP Location: Left arm, Patient Position: Sitting, BP Method: Medium (Automatic))   Pulse 64   Resp 16   Ht 5' 4" (1.626 m)   Wt 86.8 kg (191 lb 4.8 oz)   SpO2 97%   BMI 32.84 kg/m²   Physical Exam   Constitutional: She appears well-developed and well-nourished. No distress.   Eyes: Pupils are equal, round, and reactive to light. Conjunctivae and EOM are normal. No scleral icterus.   Cardiovascular: Normal rate, regular rhythm and normal heart sounds.   No murmur heard.  Pulmonary/Chest: Effort normal and breath sounds normal. No stridor. No respiratory distress.   Skin: Skin is warm and dry. No rash noted. She is not diaphoretic.   Psychiatric: She has a normal mood and affect. Her behavior is normal. Judgment and thought content normal.   Vitals reviewed.      Assessment:       1. HTN (hypertension), benign    2. CKD (chronic kidney disease), stage III    3. Vitamin D deficiency        Plan:       Stable; continue current regimen; f/u in 4 months    HTN (hypertension), benign  -     " TSH; Future; Expected date: 05/14/2020  -     Lipid panel; Future; Expected date: 04/14/2020    CKD (chronic kidney disease), stage III  -     Comprehensive metabolic panel; Future; Expected date: 04/14/2020  -     CBC auto differential; Future; Expected date: 04/14/2020    Vitamin D deficiency  -     Vitamin D; Future; Expected date: 04/14/2020            Risks, benefits, and side effects were discussed with the patient. All questions were answered to the fullest satisfaction of the patient, and pt verbalized understanding and agreement to treatment plan. Pt was to call with any new or worsening symptoms, or present to the ER.

## 2020-01-27 ENCOUNTER — PATIENT OUTREACH (OUTPATIENT)
Dept: ADMINISTRATIVE | Facility: OTHER | Age: 85
End: 2020-01-27

## 2020-01-28 ENCOUNTER — OFFICE VISIT (OUTPATIENT)
Dept: PODIATRY | Facility: CLINIC | Age: 85
End: 2020-01-28
Payer: MEDICARE

## 2020-01-28 VITALS
TEMPERATURE: 98 F | SYSTOLIC BLOOD PRESSURE: 157 MMHG | BODY MASS INDEX: 32.44 KG/M2 | WEIGHT: 190 LBS | DIASTOLIC BLOOD PRESSURE: 60 MMHG | HEART RATE: 67 BPM | HEIGHT: 64 IN

## 2020-01-28 DIAGNOSIS — M1A.9XX1 GOUTY TOPHI: Primary | ICD-10-CM

## 2020-01-28 PROCEDURE — 99203 PR OFFICE/OUTPT VISIT, NEW, LEVL III, 30-44 MIN: ICD-10-PCS | Mod: S$GLB,,, | Performed by: PODIATRIST

## 2020-01-28 PROCEDURE — 1159F MED LIST DOCD IN RCRD: CPT | Mod: S$GLB,,, | Performed by: PODIATRIST

## 2020-01-28 PROCEDURE — 1101F PR PT FALLS ASSESS DOC 0-1 FALLS W/OUT INJ PAST YR: ICD-10-PCS | Mod: CPTII,S$GLB,, | Performed by: PODIATRIST

## 2020-01-28 PROCEDURE — 99999 PR PBB SHADOW E&M-EST. PATIENT-LVL III: ICD-10-PCS | Mod: PBBFAC,,, | Performed by: PODIATRIST

## 2020-01-28 PROCEDURE — 1125F PR PAIN SEVERITY QUANTIFIED, PAIN PRESENT: ICD-10-PCS | Mod: S$GLB,,, | Performed by: PODIATRIST

## 2020-01-28 PROCEDURE — 1125F AMNT PAIN NOTED PAIN PRSNT: CPT | Mod: S$GLB,,, | Performed by: PODIATRIST

## 2020-01-28 PROCEDURE — 1159F PR MEDICATION LIST DOCUMENTED IN MEDICAL RECORD: ICD-10-PCS | Mod: S$GLB,,, | Performed by: PODIATRIST

## 2020-01-28 PROCEDURE — 99203 OFFICE O/P NEW LOW 30 MIN: CPT | Mod: S$GLB,,, | Performed by: PODIATRIST

## 2020-01-28 PROCEDURE — 1101F PT FALLS ASSESS-DOCD LE1/YR: CPT | Mod: CPTII,S$GLB,, | Performed by: PODIATRIST

## 2020-01-28 PROCEDURE — 99999 PR PBB SHADOW E&M-EST. PATIENT-LVL III: CPT | Mod: PBBFAC,,, | Performed by: PODIATRIST

## 2020-02-01 PROBLEM — M1A.9XX1 GOUTY TOPHI: Status: ACTIVE | Noted: 2020-02-01

## 2020-02-02 NOTE — PROGRESS NOTES
Subjective:       Patient ID: Yisel العلي is a 86 y.o. female.    Chief Complaint: Follow-up; Foot Pain; and Foot Problem   Patient presents today with a complaint of a painfully swollen and red 4th toe right foot. Patient states that this has been swollen on and off for about 3 weeks she does not recall injuring or traumatizing the area.    Past Medical History:   Diagnosis Date    Anticoagulant long-term use     Arthritis     CAD (coronary artery disease)     Cancer     skin    Cataract     CKD (chronic kidney disease), stage III     HEARING LOSS     Hematoma complicating a procedure 1213    ant abd wall    [place] (hard of hearing)     BILAT AIDS    Hyperlipidemia     Hypertension     Meniere disease     Pneumonia     Retinal detachment     not sure which eye    Vertigo     Wears glasses      Past Surgical History:   Procedure Laterality Date    carotid surgery      left endarterectomy    CATARACT EXTRACTION      ou    CORONARY STENT PLACEMENT      x1    HYSTERECTOMY      PARTIAL NEPHRECTOMY  1013    benign left kidney neoplasm    RETINAL DETACHMENT SURGERY       Family History   Problem Relation Age of Onset    Cancer Father         ?    Heart failure Father     Amblyopia Neg Hx     Blindness Neg Hx     Cataracts Neg Hx     Diabetes Neg Hx     Glaucoma Neg Hx     Hypertension Neg Hx     Macular degeneration Neg Hx     Retinal detachment Neg Hx     Strabismus Neg Hx     Stroke Neg Hx     Thyroid disease Neg Hx      Social History     Socioeconomic History    Marital status:      Spouse name: Not on file    Number of children: Not on file    Years of education: Not on file    Highest education level: Not on file   Occupational History    Not on file   Social Needs    Financial resource strain: Not on file    Food insecurity:     Worry: Not on file     Inability: Not on file    Transportation needs:     Medical: Not on file     Non-medical: Not on file   Tobacco  Use    Smoking status: Former Smoker    Smokeless tobacco: Never Used   Substance and Sexual Activity    Alcohol use: Yes     Comment: occasional    Drug use: No    Sexual activity: Not on file   Lifestyle    Physical activity:     Days per week: Not on file     Minutes per session: Not on file    Stress: Not on file   Relationships    Social connections:     Talks on phone: Not on file     Gets together: Not on file     Attends Buddhism service: Not on file     Active member of club or organization: Not on file     Attends meetings of clubs or organizations: Not on file     Relationship status: Not on file   Other Topics Concern    Not on file   Social History Narrative    Not on file       Current Outpatient Medications   Medication Sig Dispense Refill    lisinopril (PRINIVIL,ZESTRIL) 40 MG tablet Take 1 tablet (40 mg total) by mouth nightly. 90 tablet 2    amLODIPine (NORVASC) 5 MG tablet Take 1 tablet (5 mg total) by mouth once daily. (Patient not taking: Reported on 1/28/2020) 30 tablet 11    ammonium lactate (LAC-HYDRIN) 12 % lotion       aspirin (ECOTRIN) 81 MG EC tablet Take 81 mg by mouth once daily.      diaper,brief,adult,disposable (ADULT BRIEFS - LARGE) Misc 1 each by Misc.(Non-Drug; Combo Route) route 5 (five) times daily. (Patient not taking: Reported on 1/28/2020) 300 each 11    diazePAM (VALIUM) 2 MG tablet Take 1 tablet (2 mg total) by mouth daily as needed for Anxiety. (Patient not taking: Reported on 1/28/2020) 30 tablet 0    fluticasone propionate (FLONASE) 50 mcg/actuation nasal spray 2 sprays (100 mcg total) by Each Nostril route once daily. (Patient not taking: Reported on 1/28/2020) 48 g 3    hydroCHLOROthiazide (HYDRODIURIL) 12.5 MG Tab Take 1 tablet (12.5 mg total) by mouth daily as needed. (Patient not taking: Reported on 1/28/2020) 30 tablet 11    ketoconazole (NIZORAL) 2 % shampoo USE ONCE DAILY AS DIRECTED  11    meclizine (ANTIVERT) 12.5 mg tablet Take 1 tablet  "(12.5 mg total) by mouth 2 (two) times daily as needed for Dizziness. (Patient not taking: Reported on 1/28/2020) 40 tablet 2    nitroGLYCERIN (NITRODUR) 0.1 mg/hr 1 patch once daily.       nystatin-triamcinolone (MYCOLOG) ointment APPLY 2 TIMES A DAY TO AFFECTED AREAS  2    oxybutynin (DITROPAN-XL) 10 MG 24 hr tablet TAKE 1 TABLET BY MOUTH EVERY DAY (Patient not taking: Reported on 1/28/2020) 90 tablet 1     No current facility-administered medications for this visit.      Review of patient's allergies indicates:   Allergen Reactions    Codeine Other (See Comments)     dosent remember reaction;maybe nausea       Review of Systems   Musculoskeletal: Positive for arthralgias and joint swelling.   All other systems reviewed and are negative.      Objective:      Vitals:    01/28/20 1137   BP: (!) 157/60   Pulse: 67   Temp: 97.6 °F (36.4 °C)   Weight: 86.2 kg (190 lb)   Height: 5' 4" (1.626 m)     Physical Exam   Constitutional: She appears well-developed and well-nourished.   Cardiovascular:   Pulses:       Dorsalis pedis pulses are 1+ on the right side, and 1+ on the left side.        Posterior tibial pulses are 1+ on the right side, and 1+ on the left side.   Pulmonary/Chest: Effort normal.   Musculoskeletal: She exhibits tenderness and deformity.        Right foot: There is decreased range of motion and deformity.   Feet:   Right Foot:   Protective Sensation: 4 sites tested. 4 sites sensed.   Skin Integrity: Positive for erythema and warmth.   Left Foot:   Protective Sensation: 4 sites tested. 4 sites sensed.   Neurological: She is alert.   Skin: Skin is warm. Capillary refill takes more than 3 seconds. There is erythema.   Psychiatric: She has a normal mood and affect. Her behavior is normal. Judgment and thought content normal.   Nursing note and vitals reviewed.                   Assessment:       1. Gouty tophi        Plan:        Patient presents today with a complaint of a painfully swollen and red 4th " toe right foot. Patient states that this has been swollen on and off for about 3 weeks she does not recall injuring or traumatizing the area.  Patient relates that she has had pain in this toe on and off for some time she states it gets very sore for a couple days then it settles down.  On evaluation patient has significant erythema and edema of the 4th digit right there is a white substance directly underlying the skin consistent with gouty tophi.  Following evaluation and discussion patient has no history of ever having gout she states her father had gout in there is a family history of gout although she has never had gout herself I did explain gout to the patient in detail advising her she does not have to have severe attacks to have a collection of gouty tophi in the digits. At this point I am recommending we just monitor the area patient states right now it is not currently painful she needs to avoid any trauma injury rubbing or irritation to the area with shoes I have advised the patient if this becomes more painful more of a problem gets worse we can always do an incision and drainage to remove the gouty tophi but I explained to her this is not infectious process but process involving arthritic gout.  Patient was in understanding and agreement with this today we will monitor the situation and I will see the patient for follow-up as needed.  Total face-to-face time including discussion evaluation and treatment equaled 30 min at this point I did not feel any x-rays were necessary unless that we have to consider surgical intervention.This note was created using [employer] voice recognition software that occasionally misinterpreted phrases or words.

## 2020-03-02 ENCOUNTER — OFFICE VISIT (OUTPATIENT)
Dept: UROLOGY | Facility: CLINIC | Age: 85
End: 2020-03-02
Payer: MEDICARE

## 2020-03-02 ENCOUNTER — TELEPHONE (OUTPATIENT)
Dept: FAMILY MEDICINE | Facility: CLINIC | Age: 85
End: 2020-03-02

## 2020-03-02 VITALS
DIASTOLIC BLOOD PRESSURE: 66 MMHG | RESPIRATION RATE: 14 BRPM | TEMPERATURE: 98 F | BODY MASS INDEX: 31.7 KG/M2 | HEART RATE: 59 BPM | OXYGEN SATURATION: 98 % | HEIGHT: 64 IN | SYSTOLIC BLOOD PRESSURE: 140 MMHG | WEIGHT: 185.69 LBS

## 2020-03-02 DIAGNOSIS — R32 URINARY INCONTINENCE, UNSPECIFIED TYPE: Primary | ICD-10-CM

## 2020-03-02 LAB
BILIRUB SERPL-MCNC: NORMAL MG/DL
BLOOD URINE, POC: NORMAL
COLOR, POC UA: NORMAL
GLUCOSE UR QL STRIP: NORMAL
KETONES UR QL STRIP: NORMAL
LEUKOCYTE ESTERASE URINE, POC: NORMAL
NITRITE, POC UA: NORMAL
PH, POC UA: 5
PROTEIN, POC: NORMAL
SPECIFIC GRAVITY, POC UA: 1.02
UROBILINOGEN, POC UA: NORMAL

## 2020-03-02 PROCEDURE — 99215 PR OFFICE/OUTPT VISIT, EST, LEVL V, 40-54 MIN: ICD-10-PCS | Mod: 25,S$GLB,, | Performed by: UROLOGY

## 2020-03-02 PROCEDURE — 1126F AMNT PAIN NOTED NONE PRSNT: CPT | Mod: S$GLB,,, | Performed by: UROLOGY

## 2020-03-02 PROCEDURE — 81002 POCT URINE DIPSTICK WITHOUT MICROSCOPE: ICD-10-PCS | Mod: S$GLB,,, | Performed by: UROLOGY

## 2020-03-02 PROCEDURE — 99215 OFFICE O/P EST HI 40 MIN: CPT | Mod: 25,S$GLB,, | Performed by: UROLOGY

## 2020-03-02 PROCEDURE — 1101F PT FALLS ASSESS-DOCD LE1/YR: CPT | Mod: CPTII,S$GLB,, | Performed by: UROLOGY

## 2020-03-02 PROCEDURE — 1101F PR PT FALLS ASSESS DOC 0-1 FALLS W/OUT INJ PAST YR: ICD-10-PCS | Mod: CPTII,S$GLB,, | Performed by: UROLOGY

## 2020-03-02 PROCEDURE — 99999 PR PBB SHADOW E&M-EST. PATIENT-LVL III: CPT | Mod: PBBFAC,,, | Performed by: UROLOGY

## 2020-03-02 PROCEDURE — 1159F MED LIST DOCD IN RCRD: CPT | Mod: S$GLB,,, | Performed by: UROLOGY

## 2020-03-02 PROCEDURE — 1126F PR PAIN SEVERITY QUANTIFIED, NO PAIN PRESENT: ICD-10-PCS | Mod: S$GLB,,, | Performed by: UROLOGY

## 2020-03-02 PROCEDURE — 1159F PR MEDICATION LIST DOCUMENTED IN MEDICAL RECORD: ICD-10-PCS | Mod: S$GLB,,, | Performed by: UROLOGY

## 2020-03-02 PROCEDURE — 99999 PR PBB SHADOW E&M-EST. PATIENT-LVL III: ICD-10-PCS | Mod: PBBFAC,,, | Performed by: UROLOGY

## 2020-03-02 PROCEDURE — 81002 URINALYSIS NONAUTO W/O SCOPE: CPT | Mod: S$GLB,,, | Performed by: UROLOGY

## 2020-03-02 RX ORDER — OXYBUTYNIN CHLORIDE 15 MG/1
15 TABLET, EXTENDED RELEASE ORAL DAILY
Qty: 30 TABLET | Refills: 5 | Status: SHIPPED | OUTPATIENT
Start: 2020-03-02 | End: 2020-07-17

## 2020-03-02 NOTE — TELEPHONE ENCOUNTER
----- Message from Evelyn Thomas sent at 3/2/2020 12:08 PM CST -----  Contact: pt  Type: Needs Medical Advice    Who Called: Pt    Best Call Back Number: [phone]  Additional Information: Pt needs someone in the office to call her and tell her how to get to the office due to road work. Please call pt to advise.

## 2020-03-02 NOTE — PROGRESS NOTES
[facility] New Patient/H&P:    Yisel العلي is a 86 y.o. female who presents for mixed urinary incontinence.    Patient previously managed by Dr. Earl and Poppy Lagunas for nephrolithiasis and severe mixed incontinence.     Patient reports severe mixed incontinence. She states that she requires several Depends (approximately 6 or 7) throughout the day for incontinence episodes.     She was most recently evaluated by Poppy Lagunas and prescribed Ditropan XL 10 MG tabs daily. She states that it has improved her symptoms slightly, but she would prefer a higher dose.     Patient was prescribed Myrbetriq in the past by Dr. Earl, but never filled the medication due to the cost. She states that her symptoms were also not as severe at that time.     She denies any fever, chills, flank pain, UTI or recent  trauma.     Past Medical History:   Diagnosis Date    Anticoagulant long-term use     Arthritis     CAD (coronary artery disease)     Cancer     skin    Cataract     CKD (chronic kidney disease), stage III     HEARING LOSS     Hematoma complicating a procedure 1213    ant abd wall    [place] (hard of hearing)     BILAT AIDS    Hyperlipidemia     Hypertension     Meniere disease     Pneumonia     Retinal detachment     not sure which eye    Vertigo     Wears glasses        Past Surgical History:   Procedure Laterality Date    carotid surgery      left endarterectomy    CATARACT EXTRACTION      ou    CORONARY STENT PLACEMENT      x1    HYSTERECTOMY      KNEE SURGERY Right     PARTIAL NEPHRECTOMY  1013    benign left kidney neoplasm    RETINAL DETACHMENT SURGERY         Family History   Problem Relation Age of Onset    Cancer Father         ?    Heart failure Father     Amblyopia Neg Hx     Blindness Neg Hx     Cataracts Neg Hx     Diabetes Neg Hx     Glaucoma Neg Hx     Hypertension Neg Hx     Macular degeneration Neg Hx     Retinal detachment Neg Hx      Strabismus Neg Hx     Stroke Neg Hx     Thyroid disease Neg Hx        Social History     Socioeconomic History    Marital status:      Spouse name: Not on file    Number of children: Not on file    Years of education: Not on file    Highest education level: Not on file   Occupational History    Not on file   Social Needs    Financial resource strain: Not on file    Food insecurity:     Worry: Not on file     Inability: Not on file    Transportation needs:     Medical: Not on file     Non-medical: Not on file   Tobacco Use    Smoking status: Former Smoker    Smokeless tobacco: Never Used   Substance and Sexual Activity    Alcohol use: Yes     Comment: occasional    Drug use: No    Sexual activity: Not on file   Lifestyle    Physical activity:     Days per week: Not on file     Minutes per session: Not on file    Stress: Not on file   Relationships    Social connections:     Talks on phone: Not on file     Gets together: Not on file     Attends Nondenominational service: Not on file     Active member of club or organization: Not on file     Attends meetings of clubs or organizations: Not on file     Relationship status: Not on file   Other Topics Concern    Not on file   Social History Narrative    Not on file       Review of patient's allergies indicates:   Allergen Reactions    Codeine Other (See Comments)     dosent remember reaction;maybe nausea       Medications Reviewed: see MAR    ROS:    Constitutional: denies fevers, chills, night sweats, fatigue, malaise  Respiratory: negative for cough, shortness of breath, wheezing, dyspnea.  Cardiovascular: + for high blood pressure, negative for chest pain, varicose veins, ankle swelling, palpitations, syncope.  GI: negative for abdominal pain, heartburn, indigestion, nausea, vomiting, constipation, diarrhea, blood in stool.   Urology: as noted above in HPI  Endocrinology: negative for cold intolerance, excessive thirst, not feeling  "tired/sluggish, no heat intolerance.   Hematology/Lymph: negative for easy bleeding, easy bruising, swollen glands.  Musculoskeletal: negative for back pain, joint pain, joint swelling, neck pain.  Allergy-Immunology: negative for seasonal allergies, negative for unusual infections.   Skin: negative for boils, breast lumps, hives, itching, rash.   Neurology: negative for, dizziness, headache, tingling/numbness, tremors.   Psych: satisfied with life; negative for, anxiety, depression, suicidal thoughts.     PHYSICAL EXAM:    Vitals:    03/02/20 1411   BP: (!) 140/66   Pulse: (!) 59   Resp: 14   Temp: 97.8 °F (36.6 °C)     Body mass index is 31.88 kg/m². Weight: 84.2 kg (185 lb 11.2 oz) Height: 5' 4" (162.6 cm)       General: Alert, cooperative, no distress, appears stated age  Head: Normocephalic, without obvious abnormality, atraumatic  Neck: no masses, no thyromegaly, no lymphadenopathy  Eyes: PERRL, conjunctiva/corneas clear  Lungs: Respirations unlabored, normal effort, no accessory muscle use  CV: Warm and well perfused extremities  Abdomen: Soft, non-tender, no CVA tenderness, no hepatosplenomegaly, no hernia  Extremities: Extremities normal, atraumatic, no cyanosis or edema  Skin: Normal color, texture, and turgor, no rashes or lesions  Psych: Appropriate, well oriented, normal affect, normal mood  Neuro: Non-focal      LABS:    No results found for this or any previous visit (from the past 336 hour(s)).    IMAGING:    US renal  4/12/19    Normal renal ultrasound.      Assessment/Diagnosis:    1. Urinary incontinence, unspecified type  oxybutynin (DITROPAN XL) 15 MG TR24    mirabegron (MYRBETRIQ) 25 mg Tb24 ER tablet    POCT URINE DIPSTICK WITHOUT MICROSCOPE       Plans:    - I spent 40 minutes with the patient; more than 50% was in counseling about the disease process and methods of treatment. Extensive discussion with patient regarding the etiology and management of her mixed (primarily urge) incontinence. " We discussed the benefit of increasing the dose of her Ditropan since she has experienced some improvement in her symptoms. We also discussed a trial of Myrbetriq as an additional agent.   - Ditropan increased to 15 MG XL tab daily from 10 MG. Side effects discussed at length with patient.   - RX for Myrbetriq 25 MG PO daily.   - RTC in 1 month for symptom check. (patient prefers to follow up in [facility] due to transportation and proximity to her home).

## 2020-03-02 NOTE — TELEPHONE ENCOUNTER
Tried to call patient back to advise to follow detour signs, but no answer. LM to call back if she still needs assistance.

## 2020-03-09 ENCOUNTER — PATIENT OUTREACH (OUTPATIENT)
Dept: ADMINISTRATIVE | Facility: OTHER | Age: 85
End: 2020-03-09

## 2020-03-10 ENCOUNTER — OFFICE VISIT (OUTPATIENT)
Dept: CARDIOLOGY | Facility: CLINIC | Age: 85
End: 2020-03-10
Payer: MEDICARE

## 2020-03-10 VITALS
WEIGHT: 193 LBS | HEART RATE: 50 BPM | BODY MASS INDEX: 32.95 KG/M2 | DIASTOLIC BLOOD PRESSURE: 69 MMHG | HEIGHT: 64 IN | TEMPERATURE: 98 F | OXYGEN SATURATION: 99 % | RESPIRATION RATE: 18 BRPM | SYSTOLIC BLOOD PRESSURE: 122 MMHG

## 2020-03-10 DIAGNOSIS — E78.2 MIXED HYPERLIPIDEMIA: ICD-10-CM

## 2020-03-10 DIAGNOSIS — Z95.5 STATUS POST CORONARY ARTERY STENT PLACEMENT: ICD-10-CM

## 2020-03-10 DIAGNOSIS — R52 GENERALIZED BODY ACHES: ICD-10-CM

## 2020-03-10 DIAGNOSIS — R07.89 ATYPICAL CHEST PAIN: ICD-10-CM

## 2020-03-10 DIAGNOSIS — I11.9 HYPERTENSIVE HEART DISEASE WITHOUT HEART FAILURE: ICD-10-CM

## 2020-03-10 DIAGNOSIS — I25.118 CORONARY ARTERY DISEASE OF NATIVE ARTERY OF NATIVE HEART WITH STABLE ANGINA PECTORIS: Primary | ICD-10-CM

## 2020-03-10 DIAGNOSIS — Z78.9 STATIN INTOLERANCE: ICD-10-CM

## 2020-03-10 DIAGNOSIS — Z98.890 HISTORY OF LEFT-SIDED CAROTID ENDARTERECTOMY: ICD-10-CM

## 2020-03-10 DIAGNOSIS — N18.30 CKD (CHRONIC KIDNEY DISEASE), STAGE III: ICD-10-CM

## 2020-03-10 DIAGNOSIS — R42 DIZZINESS: ICD-10-CM

## 2020-03-10 DIAGNOSIS — E65 ABDOMINAL OBESITY: ICD-10-CM

## 2020-03-10 DIAGNOSIS — G47.19 EXCESSIVE DAYTIME SLEEPINESS: ICD-10-CM

## 2020-03-10 PROCEDURE — 99214 OFFICE O/P EST MOD 30 MIN: CPT | Mod: S$GLB,,, | Performed by: INTERNAL MEDICINE

## 2020-03-10 PROCEDURE — 99214 PR OFFICE/OUTPT VISIT, EST, LEVL IV, 30-39 MIN: ICD-10-PCS | Mod: S$GLB,,, | Performed by: INTERNAL MEDICINE

## 2020-03-10 PROCEDURE — 1159F PR MEDICATION LIST DOCUMENTED IN MEDICAL RECORD: ICD-10-PCS | Mod: S$GLB,,, | Performed by: INTERNAL MEDICINE

## 2020-03-10 PROCEDURE — 1101F PT FALLS ASSESS-DOCD LE1/YR: CPT | Mod: CPTII,S$GLB,, | Performed by: INTERNAL MEDICINE

## 2020-03-10 PROCEDURE — 99999 PR PBB SHADOW E&M-EST. PATIENT-LVL III: CPT | Mod: PBBFAC,,, | Performed by: INTERNAL MEDICINE

## 2020-03-10 PROCEDURE — 1101F PR PT FALLS ASSESS DOC 0-1 FALLS W/OUT INJ PAST YR: ICD-10-PCS | Mod: CPTII,S$GLB,, | Performed by: INTERNAL MEDICINE

## 2020-03-10 PROCEDURE — 99999 PR PBB SHADOW E&M-EST. PATIENT-LVL III: ICD-10-PCS | Mod: PBBFAC,,, | Performed by: INTERNAL MEDICINE

## 2020-03-10 PROCEDURE — 1159F MED LIST DOCD IN RCRD: CPT | Mod: S$GLB,,, | Performed by: INTERNAL MEDICINE

## 2020-03-10 RX ORDER — LISINOPRIL 40 MG/1
40 TABLET ORAL NIGHTLY
Qty: 90 TABLET | Refills: 3 | Status: SHIPPED | OUTPATIENT
Start: 2020-03-10 | End: 2021-01-20

## 2020-03-10 RX ORDER — EZETIMIBE 10 MG/1
10 TABLET ORAL DAILY
Qty: 30 TABLET | Refills: 11 | Status: SHIPPED | OUTPATIENT
Start: 2020-03-10 | End: 2020-09-29

## 2020-03-10 NOTE — PROGRESS NOTES
Patient ID:  Yisel العلي is a 86 y.o. female who presents for follow-up Hyperlipidemia; Hypertension; and Coronary Artery Disease      Health literacy: Medium  Activities: ADL's, exercises in water at local gym 3 x/week  Nicotine: Never  Alcohol: Wine 2x/week and whiskey 2 x/week  Illicit drugs: denies   Cardiac symptoms: None @ [facility]   Home BP: Yes - Avg 110/60's  Medication compliance: Yes  Diet: regular  Caffeine: 2-3 cups coffee/day  Labs: No TSH, Troponin, BNP:  .2H;  A1C 5.6;  Sodium 135L;  K+ 4.4;  Glucose 136H;  GFR 58.5l;  WBC 5.34;  Hemoglobin 13.6;  Cardiac CRP 0.30  Last Echo: 11.08.2018  Last stress test: 11.08.2018  Cardiovascular angiogram: None   ECG:   Fundoscopic exam:     EPWORTH SLEEPINESS SCALE 9/10/2019   Sitting and reading 0   Watching TV 0   Sitting, inactive in a public place (e.g. a theatre or a meeting) 0   As a passenger in a car for an hour without a break 2   Lying down to rest in the afternoon when circumstances permit 3   Sitting and talking to someone 0   Sitting quietly after a lunch without alcohol 3   In a car, while stopped for a few minutes in traffic 0   Total score 8         HPI    Review of Systems   Constitution: Negative.   HENT: Positive for hearing loss ([place]).    Eyes: Negative.    Cardiovascular: Negative.    Respiratory: Negative.         [place] = 6   Endocrine: Negative.    Hematologic/Lymphatic: Negative.    Skin: Positive for dry skin and suspicious lesions (See dermatology).   Musculoskeletal: Positive for arthritis and back pain (Lower back pain that does not radiate).   Genitourinary: Positive for bladder incontinence (Treated with Oxybutin).   Neurological: Positive for disturbances in coordination, dizziness, headaches and loss of balance (Uses a cane to ambulate).   Psychiatric/Behavioral: Positive for memory loss (Short term).   Allergic/Immunologic: Negative.         Objective:    Physical Exam      Assessment:       No diagnosis  found.     Plan:         There are no diagnoses linked to this encounter.

## 2020-03-10 NOTE — PROGRESS NOTES
Subjective:    Patient ID:  Yisel العلي is a 86 y.o. female who presents for evaluation of Hyperlipidemia; Hypertension; and Coronary Artery Disease  for atypical CP and chronic dizziness, refuse statin due to muscle pain, 6-months visit  Came on own  PCP: Dr. Mcmanus in [place], considering changing to Dr. Martinez  Gyn: Dr. Carmona in [facility]  Prior cardiologist: Dr. Celeste, last seen in 3/2018  Lives alone, no pet, worry about tripping with poor balance for 5 years. Have moved to a big house in [facility], able to walk inside with walker   Daughter, Vandana, RN in [facility], do not have POA   Son, David, in [place] do have POA, not medical  Retire teacher, [employer], play competitive bridge twice a week    Difficult historian with memory difficulties.     Health literacy: Medium  Activities: ADL's only, no regular exercise, walking in the pool an hour 2 and trying for 3 times weekly, using the hot tub, no problem  Nicotine: Never  Alcohol: 1-2 glasses wine/week, max 2 per day  Illicit drugs: Denies  Cardiac symptoms: None at present   Home BP:Yes - 140/80's  Medication compliance: Yes   Diet: regular, starting low calorie  Caffeine: 2 cups coffee/day  Labs: 2018 & 2019: No Troponin and TSH; .0 not on Rx, own preference; A1C 5.6%; Sodium 135L; K+ 4.5; GFR 58L; Glucose 99; WBC 6.9; Hemoglobin 13.2; High Sensitivity 0.30  No TSH, Troponin, BNP:  .2H not on Rx;  A1C 5.6;  Sodium 135L;  K+ 4.4;  Glucose 136H;  GFR 58.5l;  WBC 5.34;  Hemoglobin 13.6;    Last Echo: 2018, DSE  Last stress test: 2018  Cardiovascular angiogram: Can't remember   EC2018, SB, 58, right axis, have another appointment today  Fundoscopic exam: 2019, No retinopathy noted at present    In 2018:  Follow-up   Associated symptoms include chest pain, congestion, myalgias, neck pain and vertigo. Pertinent negatives include no coughing, diaphoresis, fever, headaches, joint  "swelling, nausea, numbness or weakness.   Hyperlipidemia   Associated symptoms include chest pain and myalgias. Pertinent negatives include no focal weakness or shortness of breath.   Hypertension   Associated symptoms include chest pain and neck pain. Pertinent negatives include no headaches, malaise/fatigue, orthopnea, palpitations, PND or shortness of breath.   Coronary Artery Disease   Symptoms include chest pain, dizziness and muscle weakness. Pertinent negatives include no leg swelling, palpitations, shortness of breath or weight gain. Risk factors include hyperlipidemia.     Dr. Celeste's note - "84 y.o. female who presents for Coronary Artery Disease (Labs); Hypertension; and Hyperlipidemia   DISCUSSED LABS AND GOALS, , NOT TAKING MEDS, , CR 1.06, NO CP OR OCC / SOB, NOT ACTIVE.   Recall CAD with single stent placed maybe 15+ year ago. Do not recall any cardiac testing. ECG in sinus bradycardia with right axis deviation. Had smoked socially during college. 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 No recent repeat lipid panel. Report compliance with medications with restart of Pravastatin since visit in 3/2018. Not active now except for card playing, stopped walking in the poor for the last 3 weeks due to the water temperature. Was doing about an hour a day. Recall being on Ranexa for a number of years, only take one a day, do not see much benefit. Lot of worries about the not able to walk due to OA with CLBP with left sciatica, also concern of poor balance and falling. Willing to consider going to the gym.    In 9/2019, return for routine follow up. Still some concern about atypical CP for the past 12 years with some chest wall tenderness with hand pressure, also some recurrent dizziness also over the past 25 years. Worry about Carotid stenosis, post left side CEA.    DSE 11/2018 Left ventricle shows mild concentric hypertrophy.  Normal central venous pressure (3 mm " Hg).  The estimated PA systolic pressure is 13.11 mm Hg  Left ventricle ejection fraction is increased (hyperdynamic) at 73%  Normal LV diastolic function.  RV systolic function is normal.  There were no arrhythmias during stress.  The EKG portion of this study is negative for myocardial ischemia.  The patient reported no symptoms during the stress test.  No Echo evidence for ischemia    HPI comments: in 3/2020, here for a rushed appointment have appointment [facility]. No change in cardiac status, more active with walking in the pool    Review of Systems   Constitution: Negative for diaphoresis, fever, malaise/fatigue, night sweats and weight gain.   HENT: Positive for congestion. Negative for nosebleeds and tinnitus.    Eyes: Positive for redness. Negative for visual disturbance.   Cardiovascular: Positive for chest pain and irregular heartbeat. Negative for claudication, cyanosis, dyspnea on exertion, leg swelling, near-syncope, orthopnea, palpitations and paroxysmal nocturnal dyspnea.   Respiratory: Positive for snoring and sputum production. Negative for cough, shortness of breath, sleep disturbances due to breathing and wheezing.         [place] score 11 down to a 6 today, awaken frequently for nocturia x 3.    Endocrine: Positive for polyuria. Negative for polydipsia.   Hematologic/Lymphatic: Does not bruise/bleed easily.   Skin: Positive for itching. Negative for color change, flushing, nail changes, poor wound healing and suspicious lesions.   Musculoskeletal: Positive for arthritis, back pain, joint pain, muscle cramps, muscle weakness, myalgias, neck pain and stiffness. Negative for falls, gout and joint swelling.   Gastrointestinal: Positive for bloating, bowel incontinence and constipation. Negative for heartburn, hematemesis, hematochezia, melena and nausea.   Genitourinary: Positive for bladder incontinence, flank pain, frequency, nocturia, pelvic pain and urgency.   Neurological: Positive for excessive  "daytime sleepiness, dizziness, light-headedness, loss of balance, paresthesias, sensory change and vertigo. Negative for disturbances in coordination, focal weakness, headaches, numbness and weakness.   Psychiatric/Behavioral: Positive for memory loss. Negative for depression and substance abuse. The patient does not have insomnia and is not nervous/anxious.      Constitution: Negative.   HENT: Positive for hearing loss ([place]).    Eyes: Negative.    Cardiovascular: Negative.    Respiratory: Negative.         [place] = 6   Endocrine: Negative.    Hematologic/Lymphatic: Negative.    Skin: Positive for dry skin and suspicious lesions (See dermatology).   Musculoskeletal: Positive for arthritis and back pain (Lower back pain that does not radiate).   Genitourinary: Positive for bladder incontinence (Treated with Oxybutin).   Neurological: Positive for disturbances in coordination, dizziness, headaches and loss of balance (Uses a cane to ambulate).   Psychiatric/Behavioral: Positive for memory loss (Short term).   Allergic/Immunologic: Negative.       Objective:    Physical Exam   Constitutional: She is oriented to person, place, and time. She appears well-developed and well-nourished.   HENT:   Head: Normocephalic.   Eyes: Pupils are equal, round, and reactive to light. Conjunctivae and EOM are normal.   Neck: Normal range of motion. Neck supple. No JVD present. No thyromegaly present.   Circumference 14"   Cardiovascular: Normal rate, regular rhythm and intact distal pulses. Exam reveals no gallop and no friction rub.   Murmur heard.   Harsh midsystolic murmur is present with a grade of 2/6 at the upper right sternal border radiating to the neck.  Pulses:       Carotid pulses are 2+ on the right side with bruit, and 2+ on the left side.       Radial pulses are 2+ on the right side, and 2+ on the left side.        Femoral pulses are 2+ on the right side, and 2+ on the left side.       Popliteal pulses are 2+ on the " "right side, and 2+ on the left side.        Dorsalis pedis pulses are 2+ on the right side, and 2+ on the left side.        Posterior tibial pulses are 2+ on the right side, and 2+ on the left side.   Pulmonary/Chest: Effort normal and breath sounds normal. She has no rales. She exhibits no tenderness.   Abdominal: Soft. Bowel sounds are normal. There is no tenderness.   Waist 46", hip 45"   Musculoskeletal: Normal range of motion. She exhibits no edema.   Lymphadenopathy:     She has no cervical adenopathy.   Neurological: She is alert and oriented to person, place, and time.   Skin: Skin is warm and dry. No rash noted.         Assessment:       1. Coronary artery disease of native artery of native heart with stable angina pectoris    2. Hypertensive heart disease without heart failure    3. Mixed hyperlipidemia, baseline LDL not available    4. Statin intolerance    5. Status post coronary artery stent placement    6. Abdominal obesity    7. Excessive daytime sleepiness    8. Mixed hyperlipidemia    9. CKD (chronic kidney disease), stage III    10. Atypical chest pain, onset 2007    11. History of left-sided carotid endarterectomy    12. Dizziness, onset 1985    13. Generalized body aches, onset 2010         Plan:       Yisel was seen today for hyperlipidemia, hypertension and coronary artery disease.    Diagnoses and all orders for this visit:    Coronary artery disease of native artery of native heart with stable angina pectoris  -     lisinopriL (PRINIVIL,ZESTRIL) 40 MG tablet; Take 1 tablet (40 mg total) by mouth nightly.    Hypertensive heart disease without heart failure  -     lisinopriL (PRINIVIL,ZESTRIL) 40 MG tablet; Take 1 tablet (40 mg total) by mouth nightly.    Mixed hyperlipidemia, baseline LDL not available  -     ezetimibe (ZETIA) 10 mg tablet; Take 1 tablet (10 mg total) by mouth once daily.  -     Lipid panel; Future    Statin intolerance    Status post coronary artery stent placement  -     " lisinopriL (PRINIVIL,ZESTRIL) 40 MG tablet; Take 1 tablet (40 mg total) by mouth nightly.  -     ezetimibe (ZETIA) 10 mg tablet; Take 1 tablet (10 mg total) by mouth once daily.  -     Lipid panel; Future    Abdominal obesity    Excessive daytime sleepiness    Mixed hyperlipidemia  -     ezetimibe (ZETIA) 10 mg tablet; Take 1 tablet (10 mg total) by mouth once daily.  -     Lipid panel; Future    CKD (chronic kidney disease), stage III  -     lisinopriL (PRINIVIL,ZESTRIL) 40 MG tablet; Take 1 tablet (40 mg total) by mouth nightly.    Atypical chest pain, onset 2007    History of left-sided carotid endarterectomy  -     lisinopriL (PRINIVIL,ZESTRIL) 40 MG tablet; Take 1 tablet (40 mg total) by mouth nightly.    Dizziness, onset 1985    Generalized body aches, onset 2010    - All medical issues reviewed, willing to try Zetia. Do not want statin Rx  - CV status stable, all medications reviewed, patient acknowledge good understanding.  - Instruction for Mediterranean diet and heart healthy exercise given.  - Check home blood pressure, 2 days weekly, do 2 readings within 5 minutes in AM and PM, keep log for review.  - Weigh twice weekly, try to lose 1-2 lbs per week  - Highly recommend 30 minutes of exercise daily, can have Sunday off, with 2-3 sessions of muscle strengthening weekly. A  would be very helpful.  - Highly recommend Yoga, Macho-Chi and or meditation  - Recommend at least biannual cardiovascular evaluation in view of her significant risk factors. Patient's preference  - Phone review / encourage use of [facility]  - Should have family member attend every doctor visit.      Patient Active Problem List   Diagnosis    Coronary artery disease of native artery of native heart with stable angina pectoris    Elevated fasting glucose    Hypercalcemia    Mixed hyperlipidemia, baseline LDL not available    HTN (hypertension), benign    Rhinitis    Tear of retina without detachment - Right Eye     EPIPHORA    Dry eyes    Pseudophakia - Both Eyes    Vitamin D deficiency    Anemia    Difficulty walking    Lower extremity pain    Back pain    Nephrolithiasis    Hypertensive heart disease without heart failure    Anxiety    Stress    Hyponatremia    Obesity, Class I, BMI 30-34.9    Memory difficulties    Poor balance, onset 2014    Status post coronary artery stent placement    BMI 33.0-33.9,adult    Abdominal obesity    Sedentary lifestyle    Chronic bilateral low back pain with left-sided sciatica    Excessive daytime sleepiness    Hyperlipidemia    CKD (chronic kidney disease), stage III    Osteoarthritis of multiple joints    HEARING LOSS    Atypical chest pain, onset 2007    History of left-sided carotid endarterectomy    Dizziness, onset 1985    Bruit of right carotid artery    Gouty tophi    Statin intolerance    Generalized body aches, onset 2010     Total face-to-face time with the patient was 35 minutes and greater than 50% was spent in counseling and coordination of care. The above assessment and plan have been discussed at length. Labs and procedure over the last 6 months reviewed. Problem List updated. Asked to bring in all active medications / pills bottles with next visit.

## 2020-03-13 ENCOUNTER — TELEPHONE (OUTPATIENT)
Dept: FAMILY MEDICINE | Facility: CLINIC | Age: 85
End: 2020-03-13

## 2020-03-13 NOTE — TELEPHONE ENCOUNTER
----- Message from Cyndi Chase sent at 3/13/2020 10:25 AM CDT -----  Contact: patient  Type: Needs Medical Advice    Who Called:  patient  Symptoms (please be specific):    How long has patient had these symptoms:    Pharmacy name and phone #:    Best Call Back Number: [phone]  Additional Information: requesting blood work order be place in epic for scheduling,

## 2020-03-16 ENCOUNTER — TELEPHONE (OUTPATIENT)
Dept: FAMILY MEDICINE | Facility: CLINIC | Age: 85
End: 2020-03-16

## 2020-03-16 NOTE — TELEPHONE ENCOUNTER
She has all the needed lab orders in her chart for tomorrow. I do not recommend she start taking amoxicillin at this time, as it does not help treat viral infections, and most sore throats are viral. Thanks

## 2020-03-16 NOTE — TELEPHONE ENCOUNTER
----- Message from Babita aHrt sent at 3/16/2020 11:36 AM CDT -----  Type: Needs Medical Advice    Who Called:  Patient  Best Call Back Number: [phone] (home)     Additional Information: Has labs scheduled for 3/17/20. She also has an annual physical with office on 3/19/20. She wants to know if office wants to add any labs to her appointment tomorrow so she would only have to go one time. Please call patient to let her know. This is the second time she is calling on this.

## 2020-03-16 NOTE — TELEPHONE ENCOUNTER
----- Message from Les Ross sent at 3/16/2020  2:49 PM CDT -----  Contact: pt  Type: Needs Medical Advice    Who Called:  pt    Best Call Back Number: [phone]  Additional Information: Pt would like to know if she should start taking the medication amoxicillin 500mgs for her sore throat. Please call to advise.

## 2020-03-17 ENCOUNTER — LAB VISIT (OUTPATIENT)
Dept: LAB | Facility: HOSPITAL | Age: 85
End: 2020-03-17
Attending: INTERNAL MEDICINE
Payer: MEDICARE

## 2020-03-17 ENCOUNTER — TELEPHONE (OUTPATIENT)
Dept: FAMILY MEDICINE | Facility: CLINIC | Age: 85
End: 2020-03-17

## 2020-03-17 DIAGNOSIS — J06.9 UPPER RESPIRATORY TRACT INFECTION, UNSPECIFIED TYPE: Primary | ICD-10-CM

## 2020-03-17 DIAGNOSIS — E78.2 MIXED HYPERLIPIDEMIA: ICD-10-CM

## 2020-03-17 DIAGNOSIS — Z95.5 STATUS POST CORONARY ARTERY STENT PLACEMENT: ICD-10-CM

## 2020-03-17 LAB
CHOLEST SERPL-MCNC: 223 MG/DL (ref 120–199)
CHOLEST/HDLC SERPL: 4.7 {RATIO} (ref 2–5)
HDLC SERPL-MCNC: 47 MG/DL (ref 40–75)
HDLC SERPL: 21.1 % (ref 20–50)
LDLC SERPL CALC-MCNC: 138.6 MG/DL (ref 63–159)
NONHDLC SERPL-MCNC: 176 MG/DL
TRIGL SERPL-MCNC: 187 MG/DL (ref 30–150)

## 2020-03-17 PROCEDURE — 80061 LIPID PANEL: CPT

## 2020-03-17 PROCEDURE — 36415 COLL VENOUS BLD VENIPUNCTURE: CPT

## 2020-03-17 RX ORDER — AZITHROMYCIN 250 MG/1
TABLET, FILM COATED ORAL
Qty: 6 TABLET | Refills: 0 | Status: SHIPPED | OUTPATIENT
Start: 2020-03-17 | End: 2020-03-22

## 2020-03-17 RX ORDER — BENZONATATE 200 MG/1
200 CAPSULE ORAL 3 TIMES DAILY PRN
Qty: 30 CAPSULE | Refills: 0 | Status: SHIPPED | OUTPATIENT
Start: 2020-03-17 | End: 2020-03-27

## 2020-03-17 NOTE — TELEPHONE ENCOUNTER
Pt advised of instructions and of medications. Pt wanted to schedule f/u for a month out. She said she will call back in 2 weeks to let us know how she feels.

## 2020-03-17 NOTE — TELEPHONE ENCOUNTER
I am going to send in an antibiotic for her and some tessalon perles for her cough. I'd like her to  some plain mucinex at her pharmacy and only take tylenol (not ibuprofen, aleve, aspirin, etc) for her fever. Thanks. Let's reschedule her appt for 2 weeks from now.

## 2020-03-17 NOTE — TELEPHONE ENCOUNTER
----- Message from Yarelis Sha sent at 3/17/2020  2:38 PM CDT -----  Contact: Yisel pt  Type: Needs Medical Advice    Who Called:  Yisel  Symptoms (please be specific):  Coughing up yellow mucus, fever 99.0, sore throat  How long has patient had these symptoms:  Off and on  Pharmacy name and phone #:    [facility]/pharmacy #[id number] - [place], [place] - [address]  [address] [place] [address]  Phone: [phone] Fax: [phone]      Best Call Back Number: [phone]  Additional Information: Pls call pt regarding her symptoms. She is requesting to have something sent to her. She is wanting to know if she should cancel her appt

## 2020-03-18 DIAGNOSIS — Z95.5 STATUS POST CORONARY ARTERY STENT PLACEMENT: ICD-10-CM

## 2020-03-18 DIAGNOSIS — E78.2 MIXED HYPERLIPIDEMIA: Primary | ICD-10-CM

## 2020-03-18 DIAGNOSIS — I25.118 CORONARY ARTERY DISEASE OF NATIVE ARTERY OF NATIVE HEART WITH STABLE ANGINA PECTORIS: ICD-10-CM

## 2020-03-18 DIAGNOSIS — Z78.9 STATIN INTOLERANCE: ICD-10-CM

## 2020-03-19 ENCOUNTER — TELEPHONE (OUTPATIENT)
Dept: FAMILY MEDICINE | Facility: CLINIC | Age: 85
End: 2020-03-19

## 2020-03-19 NOTE — TELEPHONE ENCOUNTER
I spoke with patient in her car while wearing appropriate PPE. She has a 99.6 fever and reports productive cough and sore throat. Denies shortness of breath and known sars-cov-2 exposure. Has been taking an old prescription of amoxicillin since Sunday (5 days ago) but has not started her z [place] yet. Please contact patient and tell her to start taking the z-[place] IN ADDITION to her amoxicillin. If she is no better by Saturday morning, she needs to call the COVID-19 hotline. If she gets worse today or tomorrow, she needs to call the COVID-19 hotline. Thank you!

## 2020-05-05 ENCOUNTER — PATIENT MESSAGE (OUTPATIENT)
Dept: ADMINISTRATIVE | Facility: HOSPITAL | Age: 85
End: 2020-05-05

## 2020-05-20 ENCOUNTER — PATIENT MESSAGE (OUTPATIENT)
Dept: ADMINISTRATIVE | Facility: HOSPITAL | Age: 85
End: 2020-05-20

## 2020-06-04 ENCOUNTER — LAB VISIT (OUTPATIENT)
Dept: LAB | Facility: HOSPITAL | Age: 85
End: 2020-06-04
Attending: FAMILY MEDICINE
Payer: MEDICARE

## 2020-06-04 DIAGNOSIS — I10 HTN (HYPERTENSION), BENIGN: ICD-10-CM

## 2020-06-04 DIAGNOSIS — N18.30 CKD (CHRONIC KIDNEY DISEASE), STAGE III: ICD-10-CM

## 2020-06-04 DIAGNOSIS — E55.9 VITAMIN D DEFICIENCY: ICD-10-CM

## 2020-06-04 LAB
ALBUMIN SERPL BCP-MCNC: 4.8 G/DL (ref 3.5–5.2)
ALP SERPL-CCNC: 47 U/L (ref 55–135)
ALT SERPL W/O P-5'-P-CCNC: 13 U/L (ref 10–44)
ANION GAP SERPL CALC-SCNC: 9 MMOL/L (ref 8–16)
AST SERPL-CCNC: 18 U/L (ref 10–40)
BASOPHILS # BLD AUTO: 0.07 K/UL (ref 0–0.2)
BASOPHILS NFR BLD: 1.4 % (ref 0–1.9)
BILIRUB SERPL-MCNC: 0.9 MG/DL (ref 0.1–1)
BUN SERPL-MCNC: 27 MG/DL (ref 8–23)
CALCIUM SERPL-MCNC: 10.4 MG/DL (ref 8.7–10.5)
CHLORIDE SERPL-SCNC: 96 MMOL/L (ref 95–110)
CHOLEST SERPL-MCNC: 199 MG/DL (ref 120–199)
CHOLEST/HDLC SERPL: 4.1 {RATIO} (ref 2–5)
CO2 SERPL-SCNC: 25 MMOL/L (ref 23–29)
CREAT SERPL-MCNC: 1 MG/DL (ref 0.5–1.4)
DIFFERENTIAL METHOD: ABNORMAL
EOSINOPHIL # BLD AUTO: 0.2 K/UL (ref 0–0.5)
EOSINOPHIL NFR BLD: 4.4 % (ref 0–8)
ERYTHROCYTE [DISTWIDTH] IN BLOOD BY AUTOMATED COUNT: 12.1 % (ref 11.5–14.5)
EST. GFR  (AFRICAN AMERICAN): 58.9 ML/MIN/1.73 M^2
EST. GFR  (NON AFRICAN AMERICAN): 51.1 ML/MIN/1.73 M^2
GLUCOSE SERPL-MCNC: 110 MG/DL (ref 70–110)
HCT VFR BLD AUTO: 43.2 % (ref 37–48.5)
HDLC SERPL-MCNC: 48 MG/DL (ref 40–75)
HDLC SERPL: 24.1 % (ref 20–50)
HGB BLD-MCNC: 14.6 G/DL (ref 12–16)
IMM GRANULOCYTES # BLD AUTO: 0.01 K/UL (ref 0–0.04)
IMM GRANULOCYTES NFR BLD AUTO: 0.2 % (ref 0–0.5)
LDLC SERPL CALC-MCNC: 128 MG/DL (ref 63–159)
LYMPHOCYTES # BLD AUTO: 1.9 K/UL (ref 1–4.8)
LYMPHOCYTES NFR BLD: 38.4 % (ref 18–48)
MCH RBC QN AUTO: 31.5 PG (ref 27–31)
MCHC RBC AUTO-ENTMCNC: 33.8 G/DL (ref 32–36)
MCV RBC AUTO: 93 FL (ref 82–98)
MONOCYTES # BLD AUTO: 0.4 K/UL (ref 0.3–1)
MONOCYTES NFR BLD: 7.8 % (ref 4–15)
NEUTROPHILS # BLD AUTO: 2.4 K/UL (ref 1.8–7.7)
NEUTROPHILS NFR BLD: 47.8 % (ref 38–73)
NONHDLC SERPL-MCNC: 151 MG/DL
NRBC BLD-RTO: 0 /100 WBC
PLATELET # BLD AUTO: 368 K/UL (ref 150–350)
PMV BLD AUTO: 9.5 FL (ref 9.2–12.9)
POTASSIUM SERPL-SCNC: 4.5 MMOL/L (ref 3.5–5.1)
PROT SERPL-MCNC: 7.6 G/DL (ref 6–8.4)
RBC # BLD AUTO: 4.63 M/UL (ref 4–5.4)
SODIUM SERPL-SCNC: 130 MMOL/L (ref 136–145)
TRIGL SERPL-MCNC: 115 MG/DL (ref 30–150)
TSH SERPL DL<=0.005 MIU/L-ACNC: 1.46 UIU/ML (ref 0.34–5.6)
WBC # BLD AUTO: 4.97 K/UL (ref 3.9–12.7)

## 2020-06-04 PROCEDURE — 84443 ASSAY THYROID STIM HORMONE: CPT

## 2020-06-04 PROCEDURE — 85025 COMPLETE CBC W/AUTO DIFF WBC: CPT

## 2020-06-04 PROCEDURE — 80053 COMPREHEN METABOLIC PANEL: CPT

## 2020-06-04 PROCEDURE — 36415 COLL VENOUS BLD VENIPUNCTURE: CPT

## 2020-06-04 PROCEDURE — 82306 VITAMIN D 25 HYDROXY: CPT

## 2020-06-04 PROCEDURE — 80061 LIPID PANEL: CPT

## 2020-06-05 LAB — 25(OH)D3+25(OH)D2 SERPL-MCNC: 12 NG/ML (ref 30–96)

## 2020-06-09 ENCOUNTER — OFFICE VISIT (OUTPATIENT)
Dept: FAMILY MEDICINE | Facility: CLINIC | Age: 85
End: 2020-06-09
Payer: MEDICARE

## 2020-06-09 VITALS
TEMPERATURE: 97 F | OXYGEN SATURATION: 97 % | RESPIRATION RATE: 16 BRPM | WEIGHT: 185.69 LBS | SYSTOLIC BLOOD PRESSURE: 131 MMHG | BODY MASS INDEX: 31.7 KG/M2 | HEIGHT: 64 IN | DIASTOLIC BLOOD PRESSURE: 58 MMHG | HEART RATE: 46 BPM

## 2020-06-09 DIAGNOSIS — R42 DIZZINESS: ICD-10-CM

## 2020-06-09 DIAGNOSIS — F41.8 SITUATIONAL ANXIETY: ICD-10-CM

## 2020-06-09 DIAGNOSIS — E78.5 HYPERLIPIDEMIA, UNSPECIFIED HYPERLIPIDEMIA TYPE: ICD-10-CM

## 2020-06-09 DIAGNOSIS — E55.9 VITAMIN D DEFICIENCY: Primary | ICD-10-CM

## 2020-06-09 DIAGNOSIS — Z23 NEED FOR TETANUS BOOSTER: ICD-10-CM

## 2020-06-09 DIAGNOSIS — N18.30 CKD (CHRONIC KIDNEY DISEASE), STAGE III: ICD-10-CM

## 2020-06-09 PROCEDURE — 1159F PR MEDICATION LIST DOCUMENTED IN MEDICAL RECORD: ICD-10-PCS | Mod: S$GLB,,, | Performed by: FAMILY MEDICINE

## 2020-06-09 PROCEDURE — 90471 IMMUNIZATION ADMIN: CPT | Mod: S$GLB,,, | Performed by: FAMILY MEDICINE

## 2020-06-09 PROCEDURE — 1101F PT FALLS ASSESS-DOCD LE1/YR: CPT | Mod: CPTII,S$GLB,, | Performed by: FAMILY MEDICINE

## 2020-06-09 PROCEDURE — 1159F MED LIST DOCD IN RCRD: CPT | Mod: S$GLB,,, | Performed by: FAMILY MEDICINE

## 2020-06-09 PROCEDURE — 90471 TD VACCINE GREATER THAN OR EQUAL TO 7YO WITH PRESERVATIVE IM: ICD-10-PCS | Mod: S$GLB,,, | Performed by: FAMILY MEDICINE

## 2020-06-09 PROCEDURE — 90714 TD VACCINE GREATER THAN OR EQUAL TO 7YO WITH PRESERVATIVE IM: ICD-10-PCS | Mod: S$GLB,,, | Performed by: FAMILY MEDICINE

## 2020-06-09 PROCEDURE — 90714 TD VACC NO PRESV 7 YRS+ IM: CPT | Mod: S$GLB,,, | Performed by: FAMILY MEDICINE

## 2020-06-09 PROCEDURE — 99214 PR OFFICE/OUTPT VISIT, EST, LEVL IV, 30-39 MIN: ICD-10-PCS | Mod: S$GLB,25,, | Performed by: FAMILY MEDICINE

## 2020-06-09 PROCEDURE — 99999 PR PBB SHADOW E&M-EST. PATIENT-LVL IV: CPT | Mod: PBBFAC,,, | Performed by: FAMILY MEDICINE

## 2020-06-09 PROCEDURE — 1101F PR PT FALLS ASSESS DOC 0-1 FALLS W/OUT INJ PAST YR: ICD-10-PCS | Mod: CPTII,S$GLB,, | Performed by: FAMILY MEDICINE

## 2020-06-09 PROCEDURE — 99214 OFFICE O/P EST MOD 30 MIN: CPT | Mod: S$GLB,25,, | Performed by: FAMILY MEDICINE

## 2020-06-09 PROCEDURE — 99999 PR PBB SHADOW E&M-EST. PATIENT-LVL IV: ICD-10-PCS | Mod: PBBFAC,,, | Performed by: FAMILY MEDICINE

## 2020-06-09 RX ORDER — DIAZEPAM 2 MG/1
2 TABLET ORAL DAILY PRN
Qty: 30 TABLET | Refills: 0 | Status: SHIPPED | OUTPATIENT
Start: 2020-06-09 | End: 2021-01-07 | Stop reason: SDUPTHER

## 2020-06-09 RX ORDER — ERGOCALCIFEROL 1.25 MG/1
50000 CAPSULE ORAL
Qty: 12 CAPSULE | Refills: 3 | Status: SHIPPED | OUTPATIENT
Start: 2020-06-15 | End: 2020-10-15 | Stop reason: SDUPTHER

## 2020-06-09 NOTE — PROGRESS NOTES
Subjective:       Patient ID: Yisel العلي is a 86 y.o. female.    Chief Complaint: Follow-up (BW review) and Medication Refill    HPI   Follow-up and lab review. Saw cardiology 3 months ago. Most recent labs show improvement in total cholesterol and LDL since starting zetia. Kidney function is stable from last 2 sets of bloodwork. Has mild hyponatremia and admits to dizziness. Hctz is on her list, but she rarely takes it. Vitamin D was very low, and she is not currently taking any replacement. Blood pressure is well-controlled. Normal tsh, hgb, hct.    Requests a refill of valium, which she takes as needed for anxiety.  reviewed and consistent with rare, as needed use.    Patient agrees to get tetanus shot today.    Review of Systems   Constitutional: Negative for chills, diaphoresis, fatigue and fever.   Respiratory: Negative for cough, choking, shortness of breath and stridor.    Neurological: Positive for dizziness. Negative for tremors, seizures and syncope.       Past Medical History:   Diagnosis Date    Anticoagulant long-term use     Arthritis     CAD (coronary artery disease)     Cancer     skin    Cataract     CKD (chronic kidney disease), stage III     HEARING LOSS     Hematoma complicating a procedure 1213    ant abd wall    [place] (hard of hearing)     BILAT AIDS    Hyperlipidemia     Hypertension     Meniere disease     Pneumonia     Retinal detachment     not sure which eye    Vertigo     Wears glasses      Past Surgical History:   Procedure Laterality Date    carotid surgery      left endarterectomy    CATARACT EXTRACTION      ou    CORONARY STENT PLACEMENT      x1    HYSTERECTOMY      KNEE SURGERY Right     PARTIAL NEPHRECTOMY  1013    benign left kidney neoplasm    RETINAL DETACHMENT SURGERY       Social History     Socioeconomic History    Marital status:      Spouse name: Not on file    Number of children: Not on file    Years of education: Not on file     "Highest education level: Not on file   Occupational History    Not on file   Social Needs    Financial resource strain: Not on file    Food insecurity:     Worry: Not on file     Inability: Not on file    Transportation needs:     Medical: Not on file     Non-medical: Not on file   Tobacco Use    Smoking status: Former Smoker    Smokeless tobacco: Never Used   Substance and Sexual Activity    Alcohol use: Yes     Comment: occasional    Drug use: No    Sexual activity: Not on file   Lifestyle    Physical activity:     Days per week: Not on file     Minutes per session: Not on file    Stress: Not on file   Relationships    Social connections:     Talks on phone: Not on file     Gets together: Not on file     Attends Faith service: Not on file     Active member of club or organization: Not on file     Attends meetings of clubs or organizations: Not on file     Relationship status: Not on file   Other Topics Concern    Not on file   Social History Narrative    Not on file     Family History   Problem Relation Age of Onset    Cancer Father         ?    Heart failure Father     Amblyopia Neg Hx     Blindness Neg Hx     Cataracts Neg Hx     Diabetes Neg Hx     Glaucoma Neg Hx     Hypertension Neg Hx     Macular degeneration Neg Hx     Retinal detachment Neg Hx     Strabismus Neg Hx     Stroke Neg Hx     Thyroid disease Neg Hx        Objective:      BP (!) 131/58 (BP Location: Left arm, Patient Position: Sitting, BP Method: Medium (Automatic))   Pulse (!) 46   Temp 97.2 °F (36.2 °C) (Oral)   Resp 16   Ht 5' 4" (1.626 m)   Wt 84.2 kg (185 lb 11.2 oz)   SpO2 97%   BMI 31.88 kg/m²   Physical Exam   Constitutional: She appears well-developed and well-nourished. No distress.   HENT:   Head: Normocephalic and atraumatic.   Eyes: Conjunctivae are normal. Right eye exhibits no discharge. Left eye exhibits no discharge. No scleral icterus.   Cardiovascular: Exam reveals no friction rub. "   Pulmonary/Chest: Effort normal.   Neurological: She is alert.   Skin: Skin is warm and dry. No rash noted. She is not diaphoretic.   Psychiatric: She has a normal mood and affect. Her behavior is normal. Judgment and thought content normal.   Vitals reviewed.      Assessment:       1. Vitamin D deficiency    2. Hyperlipidemia, unspecified hyperlipidemia type    3. CKD (chronic kidney disease), stage III    4. Situational anxiety    5. Need for tetanus booster        Plan:       CKD stable, sending in ergocalciferol for vit d def, try holding oxybutynin since dizziness is more concerning to patient at the moment, bp stable, cholesterol improved. Tetanus updated.     Vitamin D deficiency  -     ergocalciferol (ERGOCALCIFEROL) 50,000 unit Cap; Take 1 capsule (50,000 Units total) by mouth every Monday.  Dispense: 12 capsule; Refill: 3  -     Vitamin D; Future; Expected date: 12/09/2020    Hyperlipidemia, unspecified hyperlipidemia type  -     Lipid Panel; Future; Expected date: 12/09/2020  -     CBC auto differential; Future; Expected date: 12/09/2020    CKD (chronic kidney disease), stage III  -     Comprehensive metabolic panel; Future; Expected date: 12/09/2020    Situational anxiety  -     diazePAM (VALIUM) 2 MG tablet; Take 1 tablet (2 mg total) by mouth daily as needed for Anxiety.  Dispense: 30 tablet; Refill: 0    Need for tetanus booster  -     Td Vaccine (Adult)            Risks, benefits, and side effects were discussed with the patient. All questions were answered to the fullest satisfaction of the patient, and pt verbalized understanding and agreement to treatment plan. Pt was to call with any new or worsening symptoms, or present to the ER.

## 2020-07-07 ENCOUNTER — OFFICE VISIT (OUTPATIENT)
Dept: PODIATRY | Facility: CLINIC | Age: 85
End: 2020-07-07
Payer: MEDICARE

## 2020-07-07 VITALS
WEIGHT: 183 LBS | DIASTOLIC BLOOD PRESSURE: 91 MMHG | RESPIRATION RATE: 18 BRPM | SYSTOLIC BLOOD PRESSURE: 180 MMHG | BODY MASS INDEX: 31.24 KG/M2 | TEMPERATURE: 98 F | HEIGHT: 64 IN | HEART RATE: 52 BPM

## 2020-07-07 DIAGNOSIS — M1A.9XX1 GOUTY TOPHI: ICD-10-CM

## 2020-07-07 DIAGNOSIS — M15.9 PRIMARY OSTEOARTHRITIS INVOLVING MULTIPLE JOINTS: Primary | ICD-10-CM

## 2020-07-07 PROCEDURE — 1101F PR PT FALLS ASSESS DOC 0-1 FALLS W/OUT INJ PAST YR: ICD-10-PCS | Mod: CPTII,S$GLB,, | Performed by: PODIATRIST

## 2020-07-07 PROCEDURE — 99999 PR PBB SHADOW E&M-EST. PATIENT-LVL IV: ICD-10-PCS | Mod: PBBFAC,,, | Performed by: PODIATRIST

## 2020-07-07 PROCEDURE — 99999 PR PBB SHADOW E&M-EST. PATIENT-LVL IV: CPT | Mod: PBBFAC,,, | Performed by: PODIATRIST

## 2020-07-07 PROCEDURE — 1101F PT FALLS ASSESS-DOCD LE1/YR: CPT | Mod: CPTII,S$GLB,, | Performed by: PODIATRIST

## 2020-07-07 PROCEDURE — 1159F PR MEDICATION LIST DOCUMENTED IN MEDICAL RECORD: ICD-10-PCS | Mod: S$GLB,,, | Performed by: PODIATRIST

## 2020-07-07 PROCEDURE — 1126F AMNT PAIN NOTED NONE PRSNT: CPT | Mod: S$GLB,,, | Performed by: PODIATRIST

## 2020-07-07 PROCEDURE — 1159F MED LIST DOCD IN RCRD: CPT | Mod: S$GLB,,, | Performed by: PODIATRIST

## 2020-07-07 PROCEDURE — 99214 OFFICE O/P EST MOD 30 MIN: CPT | Mod: S$GLB,,, | Performed by: PODIATRIST

## 2020-07-07 PROCEDURE — 99214 PR OFFICE/OUTPT VISIT, EST, LEVL IV, 30-39 MIN: ICD-10-PCS | Mod: S$GLB,,, | Performed by: PODIATRIST

## 2020-07-07 PROCEDURE — 1126F PR PAIN SEVERITY QUANTIFIED, NO PAIN PRESENT: ICD-10-PCS | Mod: S$GLB,,, | Performed by: PODIATRIST

## 2020-07-07 NOTE — LETTER
July 10, 2020      Josefina Martinez DO  [address]  [place] [address]           [facility] - Podiatry/Wound [facility]  [address] [place] [address]  Phone: [phone]  Fax: [phone]          Patient: Yisel العلي   MR Number: [id number]   YOB: 1933   Date of Visit: 7/7/2020       Dear Dr. Josefina Martinez:    Thank you for referring Yisel العلي to me for evaluation. Attached you will find relevant portions of my assessment and plan of care.    If you have questions, please do not hesitate to call me. I look forward to following Yisel العلي along with you.    Sincerely,    Evans Zamora, DPGABI    Enclosure  CC:  No Recipients    If you would like to receive this communication electronically, please contact [email or web address] or [phone] to request more information on [employer] Link access.    For providers and/or their staff who would like to refer a patient to [facility], please contact us through our one-stop-shop provider referral line, [facility], at [phone].    If you feel you have received this communication in error or would no longer like to receive these types of communications, please e-mail [email or web address]

## 2020-07-11 NOTE — PROGRESS NOTES
Subjective:       Patient ID: Yisel العلي is a 86 y.o. female.    Chief Complaint: Follow-up   Patient presents today with a complaint of a painfully swollen and red 4th toe right foot.  Patient has a history of gout now she has got an area on the top of the 3rd toe left.    Past Medical History:   Diagnosis Date    Anticoagulant long-term use     Arthritis     CAD (coronary artery disease)     Cancer     skin    Cataract     CKD (chronic kidney disease), stage III     HEARING LOSS     Hematoma complicating a procedure 1213    ant abd wall    [place] (hard of hearing)     BILAT AIDS    Hyperlipidemia     Hypertension     Meniere disease     Pneumonia     Retinal detachment     not sure which eye    Vertigo     Wears glasses      Past Surgical History:   Procedure Laterality Date    carotid surgery      left endarterectomy    CATARACT EXTRACTION      ou    CORONARY STENT PLACEMENT      x1    HYSTERECTOMY      KNEE SURGERY Right     PARTIAL NEPHRECTOMY  1013    benign left kidney neoplasm    RETINAL DETACHMENT SURGERY       Family History   Problem Relation Age of Onset    Cancer Father         ?    Heart failure Father     Amblyopia Neg Hx     Blindness Neg Hx     Cataracts Neg Hx     Diabetes Neg Hx     Glaucoma Neg Hx     Hypertension Neg Hx     Macular degeneration Neg Hx     Retinal detachment Neg Hx     Strabismus Neg Hx     Stroke Neg Hx     Thyroid disease Neg Hx      Social History     Socioeconomic History    Marital status:      Spouse name: Not on file    Number of children: Not on file    Years of education: Not on file    Highest education level: Not on file   Occupational History    Not on file   Social Needs    Financial resource strain: Not on file    Food insecurity     Worry: Not on file     Inability: Not on file    Transportation needs     Medical: Not on file     Non-medical: Not on file   Tobacco Use    Smoking status: Former Smoker     Smokeless tobacco: Never Used   Substance and Sexual Activity    Alcohol use: Yes     Comment: occasional    Drug use: No    Sexual activity: Not on file   Lifestyle    Physical activity     Days per week: Not on file     Minutes per session: Not on file    Stress: Not on file   Relationships    Social connections     Talks on phone: Not on file     Gets together: Not on file     Attends Restoration service: Not on file     Active member of club or organization: Not on file     Attends meetings of clubs or organizations: Not on file     Relationship status: Not on file   Other Topics Concern    Not on file   Social History Narrative    Not on file       Current Outpatient Medications   Medication Sig Dispense Refill    ammonium lactate (LAC-HYDRIN) 12 % lotion       aspirin (ECOTRIN) 81 MG EC tablet Take 81 mg by mouth once daily.      diaper,brief,adult,disposable (ADULT BRIEFS - LARGE) Misc 1 each by Misc.(Non-Drug; Combo Route) route 5 (five) times daily. 300 each 11    diazePAM (VALIUM) 2 MG tablet Take 1 tablet (2 mg total) by mouth daily as needed for Anxiety. 30 tablet 0    ergocalciferol (ERGOCALCIFEROL) 50,000 unit Cap Take 1 capsule (50,000 Units total) by mouth every Monday. 12 capsule 3    ezetimibe (ZETIA) 10 mg tablet Take 1 tablet (10 mg total) by mouth once daily. 30 tablet 11    fluticasone propionate (FLONASE) 50 mcg/actuation nasal spray 2 sprays (100 mcg total) by Each Nostril route once daily. 48 g 3    ketoconazole (NIZORAL) 2 % shampoo USE ONCE DAILY AS DIRECTED  11    lisinopriL (PRINIVIL,ZESTRIL) 40 MG tablet Take 1 tablet (40 mg total) by mouth nightly. 90 tablet 3    meclizine (ANTIVERT) 12.5 mg tablet Take 1 tablet (12.5 mg total) by mouth 2 (two) times daily as needed for Dizziness. 40 tablet 2    oxybutynin (DITROPAN XL) 15 MG TR24 Take 1 tablet (15 mg total) by mouth once daily. 30 tablet 5    amLODIPine (NORVASC) 5 MG tablet Take 1 tablet (5 mg total) by mouth once  "daily. (Patient not taking: Reported on 7/7/2020) 30 tablet 11    hydroCHLOROthiazide (HYDRODIURIL) 12.5 MG Tab Take 1 tablet (12.5 mg total) by mouth daily as needed. (Patient not taking: Reported on 7/7/2020) 30 tablet 11    mirabegron (MYRBETRIQ) 25 mg Tb24 ER tablet Take 1 tablet (25 mg total) by mouth once daily. (Patient not taking: Reported on 7/7/2020) 30 tablet 5    nitroGLYCERIN (NITRODUR) 0.1 mg/hr 1 patch once daily.       nystatin-triamcinolone (MYCOLOG) ointment APPLY 2 TIMES A DAY TO AFFECTED AREAS  2     No current facility-administered medications for this visit.      Review of patient's allergies indicates:   Allergen Reactions    Codeine Other (See Comments)     dosent remember reaction;maybe nausea       Review of Systems   Musculoskeletal: Positive for arthralgias and joint swelling.   All other systems reviewed and are negative.      Objective:      Vitals:    07/07/20 1618   BP: (!) 180/91   BP Location: Right arm   Patient Position: Sitting   Pulse: (!) 52   Resp: 18   Temp: 97.6 °F (36.4 °C)   TempSrc: Oral   Weight: 83 kg (183 lb)   Height: 5' 4" (1.626 m)     Physical Exam  Vitals signs and nursing note reviewed.   Constitutional:       Appearance: She is well-developed.   Cardiovascular:      Pulses:           Dorsalis pedis pulses are 1+ on the right side and 1+ on the left side.        Posterior tibial pulses are 1+ on the right side and 1+ on the left side.   Pulmonary:      Effort: Pulmonary effort is normal.   Musculoskeletal:         General: Tenderness and deformity present.      Right foot: Decreased range of motion. Deformity present.   Feet:      Right foot:      Protective Sensation: 4 sites tested. 4 sites sensed.      Skin integrity: Erythema and warmth present.      Left foot:      Protective Sensation: 4 sites tested. 4 sites sensed.   Skin:     General: Skin is warm.      Capillary Refill: Capillary refill takes more than 3 seconds.      Findings: Erythema present. "   Neurological:      Mental Status: She is alert.   Psychiatric:         Behavior: Behavior normal.         Thought Content: Thought content normal.         Judgment: Judgment normal.                          Assessment:       1. Primary osteoarthritis involving multiple joints    2. Gouty tophi        Plan:        Patient presents today for followup she has a history of gout her 4th digit right foot is very swollen there is obvious gouty tophi underlying the skin she now has an area of breakdown on the dorsal 3rd digit left.  I did discuss at length and in detail with the patient keeping these well protected prevent anything that rubs or irritates the areas I have advised the patient other than surgically draining the gouty tophi from the digit and monitoring her diet there is nothing else that we can do at this point I have advised her the skin could break open and some of the toe tophi could drain from the area small amount of tophi was present on the 3rd digit left.  Surgical intervention consultation was provided today for the 4th digit right I have advised the patient I would only recommend this if this was bothering her on a consistent basis this would basically be an incision and drainage procedure it would be minor she would be able to bear weight right after surgery but certainly this is a surgical procedure and she needs to consider this carefully.  Patient was in understanding and agreement with this plan follow-up will be as needed patient advised to contact us with any problems questions or concerns should any of these areas of breakdown become further ulcerated or start to drain she will contact us immediately.  Face-to-face time including discussion evaluation treatment surgical consultation equaled 25 min.  X-rays will be necessary if the patient is considering surgical intervention.  This note was created using [employer] voice recognition software that occasionally misinterpreted phrases or  words.

## 2020-07-17 ENCOUNTER — OFFICE VISIT (OUTPATIENT)
Dept: UROLOGY | Facility: CLINIC | Age: 85
End: 2020-07-17
Payer: MEDICARE

## 2020-07-17 VITALS
SYSTOLIC BLOOD PRESSURE: 134 MMHG | RESPIRATION RATE: 16 BRPM | WEIGHT: 183 LBS | HEIGHT: 64 IN | TEMPERATURE: 98 F | HEART RATE: 48 BPM | DIASTOLIC BLOOD PRESSURE: 69 MMHG | BODY MASS INDEX: 31.24 KG/M2

## 2020-07-17 DIAGNOSIS — R32 URINARY INCONTINENCE, UNSPECIFIED TYPE: ICD-10-CM

## 2020-07-17 PROCEDURE — 1159F MED LIST DOCD IN RCRD: CPT | Mod: S$GLB,,, | Performed by: UROLOGY

## 2020-07-17 PROCEDURE — 99213 PR OFFICE/OUTPT VISIT, EST, LEVL III, 20-29 MIN: ICD-10-PCS | Mod: S$GLB,,, | Performed by: UROLOGY

## 2020-07-17 PROCEDURE — 1159F PR MEDICATION LIST DOCUMENTED IN MEDICAL RECORD: ICD-10-PCS | Mod: S$GLB,,, | Performed by: UROLOGY

## 2020-07-17 PROCEDURE — 99999 PR PBB SHADOW E&M-EST. PATIENT-LVL IV: ICD-10-PCS | Mod: PBBFAC,,, | Performed by: UROLOGY

## 2020-07-17 PROCEDURE — 1101F PT FALLS ASSESS-DOCD LE1/YR: CPT | Mod: CPTII,S$GLB,, | Performed by: UROLOGY

## 2020-07-17 PROCEDURE — 1126F AMNT PAIN NOTED NONE PRSNT: CPT | Mod: S$GLB,,, | Performed by: UROLOGY

## 2020-07-17 PROCEDURE — 99213 OFFICE O/P EST LOW 20 MIN: CPT | Mod: S$GLB,,, | Performed by: UROLOGY

## 2020-07-17 PROCEDURE — 1126F PR PAIN SEVERITY QUANTIFIED, NO PAIN PRESENT: ICD-10-PCS | Mod: S$GLB,,, | Performed by: UROLOGY

## 2020-07-17 PROCEDURE — 1101F PR PT FALLS ASSESS DOC 0-1 FALLS W/OUT INJ PAST YR: ICD-10-PCS | Mod: CPTII,S$GLB,, | Performed by: UROLOGY

## 2020-07-17 PROCEDURE — 99999 PR PBB SHADOW E&M-EST. PATIENT-LVL IV: CPT | Mod: PBBFAC,,, | Performed by: UROLOGY

## 2020-07-17 RX ORDER — OXYBUTYNIN CHLORIDE 10 MG/1
10 TABLET, EXTENDED RELEASE ORAL DAILY
Qty: 30 TABLET | Refills: 11 | Status: SHIPPED | OUTPATIENT
Start: 2020-07-17 | End: 2020-07-17 | Stop reason: SDUPTHER

## 2020-07-17 RX ORDER — OXYBUTYNIN CHLORIDE 10 MG/1
10 TABLET, EXTENDED RELEASE ORAL DAILY
Qty: 30 TABLET | Refills: 11 | Status: SHIPPED | OUTPATIENT
Start: 2020-07-17 | End: 2021-05-13

## 2020-07-17 NOTE — PROGRESS NOTES
Mrs. العلي is an 86-year-old female last seen by Dr. Cardenas on March 2020.  The patient was found to have total urinary incontinence.  She was prescribed oxybutynin XL 15 mg daily.  The patient referred that that medication help her marginally I still have to use pads to keep her dry use more than for today.  The patient denies any significant side effects from the current medication that further asked kin the patient revealed that she is mildly constipated and she continuously takes fiber supplements or stool softeners for a having normal bowel movements.  The patient was requested visit at this point determine is the medication was well tolerated on any improvement on her symptoms.    The patient have no acceptable risk for any surgical procedures.  I explained to the patient in detail how to perform Kegel exercises I suggested to her that we need to decrease the amount of anticholinergics to oxybutynin 10 mg XL a day.  We are also going to try to see if we can obtain some type of insurance help for her a incontinent pads.    All the questions were answered at her satisfaction a new prescription for lower doses of Ditropan was given she is going to return to the clinic in 6 months.    The past medical and surgical history the current medications allergies under visit with Dr. Cardenas on 0 3 months ago were all reviewed by me during this event.

## 2020-07-17 NOTE — LETTER
July 17, 2020      Josefina Martinez DO  [address] [place] [address]           [facility] - Urology  [address]  [place] [address]  Phone: [phone]  Fax: [phone]          Patient: Yisel العلي   MR Number: [id number]   YOB: 1933   Date of Visit: 7/17/2020       Dear Dr. Josefina Martinez:    Thank you for referring Yisel العلي to me for evaluation. Attached you will find relevant portions of my assessment and plan of care.    If you have questions, please do not hesitate to call me. I look forward to following Yisel العلي along with you.    Sincerely,    Jeevan Rothman MD    Enclosure  CC:  No Recipients    If you would like to receive this communication electronically, please contact [email or web address] or [phone] to request more information on [employer] Link access.    For providers and/or their staff who would like to refer a patient to [facility], please contact us through our one-stop-shop provider referral line, [facility] , at [phone].    If you feel you have received this communication in error or would no longer like to receive these types of communications, please e-mail [email or web address]

## 2020-07-23 ENCOUNTER — TELEPHONE (OUTPATIENT)
Dept: UROLOGY | Facility: CLINIC | Age: 85
End: 2020-07-23

## 2020-07-23 NOTE — TELEPHONE ENCOUNTER
Informed pt [facility] stated [employer] will not cover the cost of Rx for adult briefs, pt was given the number and website for personally delivered, pt was also instructed if she calls her insurance and finds out what company would cover the cost of the briefs to call back and I will get her set up with that company. Pt verbalized an understanding.       ----- Message from Les Ross sent at 7/23/2020  2:21 PM CDT -----  Contact: pt  Type: Needs Medical Advice    Who Called:  pt    Best Call Back Number: [phone]  Additional Information: pt would  like to speak to a nurse to follow up on getting adult diapers. Please call to advise.

## 2020-10-01 ENCOUNTER — PATIENT OUTREACH (OUTPATIENT)
Dept: ADMINISTRATIVE | Facility: OTHER | Age: 85
End: 2020-10-01

## 2020-10-01 NOTE — PROGRESS NOTES
Chart was reviewed for overdue Proactive [facility] Encounters ([facility])  topics  Updates were requested from care everywhere  Health Maintenance has been updated  [place] immunization registry triggered

## 2020-10-06 ENCOUNTER — OFFICE VISIT (OUTPATIENT)
Dept: CARDIOLOGY | Facility: CLINIC | Age: 85
End: 2020-10-06
Payer: MEDICARE

## 2020-10-06 ENCOUNTER — LAB VISIT (OUTPATIENT)
Dept: LAB | Facility: HOSPITAL | Age: 85
End: 2020-10-06
Attending: FAMILY MEDICINE
Payer: MEDICARE

## 2020-10-06 VITALS
DIASTOLIC BLOOD PRESSURE: 68 MMHG | OXYGEN SATURATION: 96 % | TEMPERATURE: 98 F | BODY MASS INDEX: 30.56 KG/M2 | WEIGHT: 179 LBS | SYSTOLIC BLOOD PRESSURE: 153 MMHG | HEIGHT: 64 IN | HEART RATE: 53 BPM | RESPIRATION RATE: 18 BRPM

## 2020-10-06 DIAGNOSIS — R00.1 BRADYCARDIA: ICD-10-CM

## 2020-10-06 DIAGNOSIS — E55.9 VITAMIN D DEFICIENCY: ICD-10-CM

## 2020-10-06 DIAGNOSIS — Z78.9 STATIN INTOLERANCE: ICD-10-CM

## 2020-10-06 DIAGNOSIS — E87.1 HYPONATREMIA: ICD-10-CM

## 2020-10-06 DIAGNOSIS — Z95.5 STATUS POST CORONARY ARTERY STENT PLACEMENT: Primary | ICD-10-CM

## 2020-10-06 DIAGNOSIS — R42 DIZZINESS: ICD-10-CM

## 2020-10-06 DIAGNOSIS — Z91.89 SEDENTARY LIFESTYLE: ICD-10-CM

## 2020-10-06 DIAGNOSIS — R07.89 ATYPICAL CHEST PAIN: ICD-10-CM

## 2020-10-06 DIAGNOSIS — N18.30 CKD (CHRONIC KIDNEY DISEASE), STAGE III: ICD-10-CM

## 2020-10-06 DIAGNOSIS — Z91.199 PERSONAL HISTORY OF NONCOMPLIANCE WITH MEDICAL TREATMENT, PRESENTING HAZARDS TO HEALTH: ICD-10-CM

## 2020-10-06 DIAGNOSIS — E78.5 HYPERLIPIDEMIA, UNSPECIFIED HYPERLIPIDEMIA TYPE: ICD-10-CM

## 2020-10-06 DIAGNOSIS — G89.4 CHRONIC PAIN SYNDROME: ICD-10-CM

## 2020-10-06 DIAGNOSIS — R79.89 PRERENAL AZOTEMIA: ICD-10-CM

## 2020-10-06 DIAGNOSIS — N18.31 STAGE 3A CHRONIC KIDNEY DISEASE: ICD-10-CM

## 2020-10-06 DIAGNOSIS — E65 ABDOMINAL OBESITY: ICD-10-CM

## 2020-10-06 DIAGNOSIS — M15.9 PRIMARY OSTEOARTHRITIS INVOLVING MULTIPLE JOINTS: ICD-10-CM

## 2020-10-06 DIAGNOSIS — E78.2 MIXED HYPERLIPIDEMIA: ICD-10-CM

## 2020-10-06 DIAGNOSIS — I11.9 HYPERTENSIVE HEART DISEASE WITHOUT HEART FAILURE: ICD-10-CM

## 2020-10-06 DIAGNOSIS — F09 COGNITIVE DYSFUNCTION: ICD-10-CM

## 2020-10-06 LAB
25(OH)D3+25(OH)D2 SERPL-MCNC: 13 NG/ML (ref 30–96)
ALBUMIN SERPL BCP-MCNC: 4.3 G/DL (ref 3.5–5.2)
ALP SERPL-CCNC: 41 U/L (ref 55–135)
ALT SERPL W/O P-5'-P-CCNC: 14 U/L (ref 10–44)
ANION GAP SERPL CALC-SCNC: 9 MMOL/L (ref 8–16)
AST SERPL-CCNC: 17 U/L (ref 10–40)
BASOPHILS # BLD AUTO: 0.07 K/UL (ref 0–0.2)
BASOPHILS NFR BLD: 1.3 % (ref 0–1.9)
BILIRUB SERPL-MCNC: 0.7 MG/DL (ref 0.1–1)
BUN SERPL-MCNC: 24 MG/DL (ref 8–23)
CALCIUM SERPL-MCNC: 10.4 MG/DL (ref 8.7–10.5)
CHLORIDE SERPL-SCNC: 101 MMOL/L (ref 95–110)
CHOLEST SERPL-MCNC: 233 MG/DL (ref 120–199)
CHOLEST/HDLC SERPL: 4.5 {RATIO} (ref 2–5)
CO2 SERPL-SCNC: 26 MMOL/L (ref 23–29)
CREAT SERPL-MCNC: 0.9 MG/DL (ref 0.5–1.4)
DIFFERENTIAL METHOD: ABNORMAL
EOSINOPHIL # BLD AUTO: 0.3 K/UL (ref 0–0.5)
EOSINOPHIL NFR BLD: 5.4 % (ref 0–8)
ERYTHROCYTE [DISTWIDTH] IN BLOOD BY AUTOMATED COUNT: 12.1 % (ref 11.5–14.5)
EST. GFR  (AFRICAN AMERICAN): >60 ML/MIN/1.73 M^2
EST. GFR  (NON AFRICAN AMERICAN): 58.1 ML/MIN/1.73 M^2
GLUCOSE SERPL-MCNC: 101 MG/DL (ref 70–110)
HCT VFR BLD AUTO: 42 % (ref 37–48.5)
HDLC SERPL-MCNC: 52 MG/DL (ref 40–75)
HDLC SERPL: 22.3 % (ref 20–50)
HGB BLD-MCNC: 13.9 G/DL (ref 12–16)
IMM GRANULOCYTES # BLD AUTO: 0 K/UL (ref 0–0.04)
IMM GRANULOCYTES NFR BLD AUTO: 0 % (ref 0–0.5)
LDLC SERPL CALC-MCNC: 148 MG/DL (ref 63–159)
LYMPHOCYTES # BLD AUTO: 1.7 K/UL (ref 1–4.8)
LYMPHOCYTES NFR BLD: 32.6 % (ref 18–48)
MCH RBC QN AUTO: 31.8 PG (ref 27–31)
MCHC RBC AUTO-ENTMCNC: 33.1 G/DL (ref 32–36)
MCV RBC AUTO: 96 FL (ref 82–98)
MONOCYTES # BLD AUTO: 0.3 K/UL (ref 0.3–1)
MONOCYTES NFR BLD: 5.6 % (ref 4–15)
NEUTROPHILS # BLD AUTO: 2.9 K/UL (ref 1.8–7.7)
NEUTROPHILS NFR BLD: 55.1 % (ref 38–73)
NONHDLC SERPL-MCNC: 181 MG/DL
NRBC BLD-RTO: 0 /100 WBC
PLATELET # BLD AUTO: 367 K/UL (ref 150–350)
PMV BLD AUTO: 10.5 FL (ref 9.2–12.9)
POTASSIUM SERPL-SCNC: 4.2 MMOL/L (ref 3.5–5.1)
PROT SERPL-MCNC: 7.3 G/DL (ref 6–8.4)
RBC # BLD AUTO: 4.37 M/UL (ref 4–5.4)
SODIUM SERPL-SCNC: 136 MMOL/L (ref 136–145)
TRIGL SERPL-MCNC: 165 MG/DL (ref 30–150)
WBC # BLD AUTO: 5.34 K/UL (ref 3.9–12.7)

## 2020-10-06 PROCEDURE — 82306 VITAMIN D 25 HYDROXY: CPT

## 2020-10-06 PROCEDURE — 80061 LIPID PANEL: CPT

## 2020-10-06 PROCEDURE — 85025 COMPLETE CBC W/AUTO DIFF WBC: CPT

## 2020-10-06 PROCEDURE — 99999 PR PBB SHADOW E&M-EST. PATIENT-LVL IV: CPT | Mod: PBBFAC,,, | Performed by: INTERNAL MEDICINE

## 2020-10-06 PROCEDURE — 36415 COLL VENOUS BLD VENIPUNCTURE: CPT

## 2020-10-06 PROCEDURE — 99999 PR PBB SHADOW E&M-EST. PATIENT-LVL IV: ICD-10-PCS | Mod: PBBFAC,,, | Performed by: INTERNAL MEDICINE

## 2020-10-06 PROCEDURE — 80053 COMPREHEN METABOLIC PANEL: CPT

## 2020-10-06 NOTE — PATIENT INSTRUCTIONS

## 2020-10-06 NOTE — PROGRESS NOTES
Subjective:    Patient ID:  Yisel العلي is a 86 y.o. female who presents for evaluation of Follow-up ((6) Month)  for atypical CP and chronic dizziness, refuse statin due to muscle pain, 6-months visit  Came on own  PCP: Dr. Mcmanus in [place], considering changing to Dr. Martinez  Gyn: Dr. Carmona in [facility]  Prior cardiologist: Dr. Celeste, last seen in 3/2018  Lives alone, no pet, worry about tripping with poor balance for 5 years. Have moved to a big house in [facility], able to walk inside with walker   Daughter, Vandana, RN in [facility] [facility], do not have POA   Son, David, in [place] do have POA, not medical  Retire teacher, [employer], play competitive bridge twice a week not since COVID    Difficult historian with memory difficulties. Here alone, daughter have to work    Health literacy: Medium  Vaccinations: unsure  Activities: ADL's only, no regular exercise, walking in the pool an hour 2 and trying for 3 times weekly none for 2 weeks due to rain, using the hot tub, no problem, limited by general OA  Nicotine: Never  Alcohol: 1-2 glasses wine/week, max 2 per day  Illicit drugs: Denies  Cardiac symptoms: None at present  Home BP:Yes - 140 to 150/80's  Medication compliance: No, was to start Zetia about 4 weeks before return visit but did not start at all   Diet: regular, starting low calorie  Caffeine: 2 cups coffee/day  Labs: 11/08/2018 & 04/12/2019: No Troponin and TSH; .0 not on Rx, own preference; A1C 5.6%; Sodium 135L; K+ 4.5; GFR 58L; Glucose 99; WBC 6.9; Hemoglobin 13.2; High Sensitivity 0.30  No TSH, Troponin, BNP:  .2H not on Rx;  A1C 5.6;  Sodium 135L;  K+ 4.4;  Glucose 136H;  GFR 58.5l;  WBC 5.34;  Hemoglobin 13.6;    Lab Results   Component Value Date    TSH 1.459 06/04/2020        Lab Results   Component Value Date    LABA1C 6.1 (H) 01/23/2015    HGBA1C 5.6 01/07/2020       Lab Results   Component Value Date    WBC 4.97 06/04/2020    HGB 14.6 06/04/2020     HCT 43.2 06/04/2020    MCV 93 06/04/2020     (H) 06/04/2020       CMP  Sodium   Date Value Ref Range Status   06/04/2020 130 (L) 136 - 145 mmol/L Final     Potassium   Date Value Ref Range Status   06/04/2020 4.5 3.5 - 5.1 mmol/L Final     Chloride   Date Value Ref Range Status   06/04/2020 96 95 - 110 mmol/L Final     CO2   Date Value Ref Range Status   06/04/2020 25 23 - 29 mmol/L Final     Glucose   Date Value Ref Range Status   06/04/2020 110 70 - 110 mg/dL Final     BUN, Bld   Date Value Ref Range Status   06/04/2020 27 (H) 8 - 23 mg/dL Final     Creatinine   Date Value Ref Range Status   06/04/2020 1.0 0.5 - 1.4 mg/dL Final     Calcium   Date Value Ref Range Status   06/04/2020 10.4 8.7 - 10.5 mg/dL Final     Total Protein   Date Value Ref Range Status   06/04/2020 7.6 6.0 - 8.4 g/dL Final     Albumin   Date Value Ref Range Status   06/04/2020 4.8 3.5 - 5.2 g/dL Final     Total Bilirubin   Date Value Ref Range Status   06/04/2020 0.9 0.1 - 1.0 mg/dL Final     Comment:     For infants and newborns, interpretation of results should be based  on gestational age, weight and in agreement with clinical  observations.  Premature Infant recommended reference ranges:  Up to 24 hours.............<8.0 mg/dL  Up to 48 hours............<12.0 mg/dL  3-5 days..................<15.0 mg/dL  6-29 days.................<15.0 mg/dL       Alkaline Phosphatase   Date Value Ref Range Status   06/04/2020 47 (L) 55 - 135 U/L Final     AST   Date Value Ref Range Status   06/04/2020 18 10 - 40 U/L Final     ALT   Date Value Ref Range Status   06/04/2020 13 10 - 44 U/L Final     Anion Gap   Date Value Ref Range Status   06/04/2020 9 8 - 16 mmol/L Final     eGFR if    Date Value Ref Range Status   06/04/2020 58.9 (A) >60 mL/min/1.73 m^2 Final     eGFR if non    Date Value Ref Range Status   06/04/2020 51.1 (A) >60 mL/min/1.73 m^2 Final     Comment:     Calculation used to obtain the estimated  "glomerular filtration  rate (eGFR) is the CKD-EPI equation.        @labrcntip(troponini)@  No results found for: BNP}   Lab Results   Component Value Date    CHOL 199 06/04/2020    CHOL 223 (H) 03/17/2020    CHOL 245 (H) 01/07/2020     Lab Results   Component Value Date    HDL 48 06/04/2020    HDL 47 03/17/2020    HDL 51 01/07/2020     Lab Results   Component Value Date    LDLCALC 128.0 06/04/2020    LDLCALC 138.6 03/17/2020    LDLCALC 160.2 (H) 01/07/2020     Lab Results   Component Value Date    TRIG 115 06/04/2020    TRIG 187 (H) 03/17/2020    TRIG 169 (H) 01/07/2020     Lab Results   Component Value Date    CHOLHDL 24.1 06/04/2020    CHOLHDL 21.1 03/17/2020    CHOLHDL 20.8 01/07/2020        Last Echo: 11/2018, DSE  Last stress test: 11/08/2018  Cardiovascular angiogram: Can't remember   ECG: SB with PAC, 48  Fundoscopic exam: 08/2019, No retinopathy noted at present    In 11/2018:  Hyperlipidemia  Associated symptoms include chest pain, myalgias and shortness of breath. Pertinent negatives include no focal weakness.   Hypertension  Associated symptoms include chest pain, neck pain and shortness of breath. Pertinent negatives include no headaches, malaise/fatigue, orthopnea, palpitations or PND.   Coronary Artery Disease  Symptoms include chest pain, dizziness, muscle weakness and shortness of breath. Pertinent negatives include no leg swelling, palpitations or weight gain. Risk factors include hyperlipidemia.   Follow-up  Associated symptoms include chest pain, congestion, myalgias, neck pain and vertigo. Pertinent negatives include no coughing, diaphoresis, fever, headaches, joint swelling, nausea, numbness or weakness.     Dr. Celeste's note - "84 y.o. female who presents for Coronary Artery Disease (Labs); Hypertension; and Hyperlipidemia   DISCUSSED LABS AND GOALS, , NOT TAKING MEDS, , CR 1.06, NO CP OR OCC / SOB, NOT ACTIVE.   Recall CAD with single stent placed maybe 15+ year ago. Do not recall " any cardiac testing. ECG in sinus bradycardia with right axis deviation. Had smoked socially during college. 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 No recent repeat lipid panel. Report compliance with medications with restart of Pravastatin since visit in 3/2018. Not active now except for card playing, stopped walking in the poor for the last 3 weeks due to the water temperature. Was doing about an hour a day. Recall being on Ranexa for a number of years, only take one a day, do not see much benefit. Lot of worries about the not able to walk due to OA with CLBP with left sciatica, also concern of poor balance and falling. Willing to consider going to the gym.    In 9/2019, return for routine follow up. Still some concern about atypical CP for the past 12 years with some chest wall tenderness with hand pressure, also some recurrent dizziness also over the past 25 years. Worry about Carotid stenosis, post left side CEA.    DSE 11/2018 Left ventricle shows mild concentric hypertrophy.  Normal central venous pressure (3 mm Hg).  The estimated PA systolic pressure is 13.11 mm Hg  Left ventricle ejection fraction is increased (hyperdynamic) at 73%  Normal LV diastolic function.  RV systolic function is normal.  There were no arrhythmias during stress.  The EKG portion of this study is negative for myocardial ischemia.  The patient reported no symptoms during the stress test.  No Echo evidence for ischemia    In 3/2020, here for a rushed appointment have appointment [facility]. No change in cardiac status, more active with walking in the pool    HPI comments: in 10/2020, here alone for 6-months review. Difficult problem with clearly cognitive impairment. Some SOB but rare.    Review of Systems   Constitution: Positive for weight loss (down 14 lbs since 3/2020 will reduce Poboys). Negative for diaphoresis, fever, malaise/fatigue, night sweats and weight gain.   HENT: Positive for congestion. Negative  for nosebleeds and tinnitus.    Eyes: Positive for redness. Negative for visual disturbance.   Cardiovascular: Positive for chest pain, dyspnea on exertion and irregular heartbeat. Negative for claudication, cyanosis, leg swelling, near-syncope, orthopnea, palpitations and paroxysmal nocturnal dyspnea.   Respiratory: Positive for shortness of breath, snoring and sputum production. Negative for cough, sleep disturbances due to breathing and wheezing.         [place] score 11 down to a 6 today, awaken frequently for nocturia x 3.    Endocrine: Positive for polyuria. Negative for polydipsia.   Hematologic/Lymphatic: Does not bruise/bleed easily.   Skin: Positive for itching. Negative for color change, flushing, nail changes, poor wound healing and suspicious lesions.   Musculoskeletal: Positive for arthritis, back pain, joint pain, muscle cramps, muscle weakness, myalgias, neck pain and stiffness. Negative for falls, gout and joint swelling.   Gastrointestinal: Positive for bloating, bowel incontinence and constipation. Negative for heartburn, hematemesis, hematochezia, melena and nausea.   Genitourinary: Positive for bladder incontinence, flank pain, frequency, nocturia, pelvic pain and urgency.   Neurological: Positive for excessive daytime sleepiness, dizziness, light-headedness, loss of balance, paresthesias, sensory change and vertigo. Negative for disturbances in coordination, focal weakness, headaches, numbness and weakness.   Psychiatric/Behavioral: Positive for memory loss. Negative for depression and substance abuse. The patient does not have insomnia and is not nervous/anxious.      Constitution: Negative.   HENT: Positive for hearing loss ([place] AU).    Eyes: Negative.    Cardiovascular: Negative.    Respiratory: Negative.         [place] = 6   Endocrine: Negative.    Hematologic/Lymphatic: Negative.    Skin: Positive for dry skin and suspicious lesions (See dermatology).   Musculoskeletal: Positive for  "arthritis and back pain (Lower back pain that does not radiate).   Genitourinary: Positive for bladder incontinence (Treated with Oxybutin).   Neurological: Positive for disturbances in coordination, dizziness, headaches and loss of balance (Uses a cane to ambulate).   Psychiatric/Behavioral: Positive for memory loss (Short term).   Allergic/Immunologic: Negative.       Objective:    Physical Exam   Constitutional: She is oriented to person, place, and time. She appears well-developed and well-nourished.   HENT:   Head: Normocephalic.   Eyes: Pupils are equal, round, and reactive to light. Conjunctivae and EOM are normal.   Neck: Normal range of motion. Neck supple. No JVD present. No thyromegaly present.   Circumference 14"   Cardiovascular: Normal rate and intact distal pulses. An irregular rhythm present. Exam reveals no gallop and no friction rub.   Murmur heard.   Harsh midsystolic murmur is present with a grade of 2/6 at the upper right sternal border radiating to the neck.  Pulses:       Carotid pulses are 2+ on the right side with bruit and 2+ on the left side.       Radial pulses are 2+ on the right side and 2+ on the left side.        Femoral pulses are 2+ on the right side and 2+ on the left side.       Popliteal pulses are 2+ on the right side and 2+ on the left side.        Dorsalis pedis pulses are 1+ on the right side and 1+ on the left side.        Posterior tibial pulses are 1+ on the right side and 1+ on the left side.   Pulmonary/Chest: Effort normal and breath sounds normal. She has no rales. She exhibits no tenderness.   Abdominal: Soft. Bowel sounds are normal. There is no abdominal tenderness.   Waist 46" down to 43", hip 45"   Musculoskeletal: Normal range of motion.         General: No edema.   Lymphadenopathy:     She has no cervical adenopathy.   Neurological: She is alert and oriented to person, place, and time.   Skin: Skin is warm and dry. No rash noted.         Assessment:       1. " Status post coronary artery stent placement    2. Cognitive dysfunction    3. Personal history of noncompliance with medical treatment, presenting hazards to health    4. Primary osteoarthritis involving multiple joints    5. Sedentary lifestyle    6. Prerenal azotemia    7. Hyponatremia    8. Stage 3a chronic kidney disease    9. Bradycardia    10. Chronic pain syndrome    11. Abdominal obesity    12. Atypical chest pain, onset 2007    13. Dizziness, onset 1985    14. Hypertensive heart disease without heart failure    15. Mixed hyperlipidemia, baseline LDL not available    16. Statin intolerance         Plan:       Yisel was seen today for follow-up.    Diagnoses and all orders for this visit:    Status post coronary artery stent placement    Cognitive dysfunction    Personal history of noncompliance with medical treatment, presenting hazards to health    Primary osteoarthritis involving multiple joints    Sedentary lifestyle    Prerenal azotemia    Hyponatremia    Stage 3a chronic kidney disease    Bradycardia    Chronic pain syndrome    Abdominal obesity    Atypical chest pain, onset 2007    Dizziness, onset 1985    Hypertensive heart disease without heart failure    Mixed hyperlipidemia, baseline LDL not available    Statin intolerance    - All medical issues reviewed, continue current Rx  - Highly recommend getting guideline directed vaccinations. Refer back to Dr. Martinez, PCP  - Need to remain well hydrated but avoid drinking within 4 hours of bedtime.  - CV status stable, all medications reviewed, patient acknowledge good understanding.  - Instruction for Mediterranean diet and heart healthy exercise given.  - Recommend healthy living: no nicotine, moderate alcohol, healthy diet and regular exercise aiming for fitness, and weight control  - Encourage activities as much as tolerated. Any activity is better than none!  - Check home blood pressure, 2 days weekly, do 2 readings within 5 minutes in AM and PM,  keep log for review.  - Weigh twice weekly, try to lose 1-2 lbs per week  - Highly recommend 30 minutes of exercise daily, can have Sunday off, with 2-3 sessions of muscle strengthening weekly. A  would be very helpful.  - Highly recommend Yoga, Macho-Chi and or meditation  - Recommend at least biannual cardiovascular evaluation in view of her significant risk factors. Patient's preference  - Should have family member attend every doctor visit if at all possible      Patient Active Problem List   Diagnosis    Coronary artery disease of native artery of native heart with stable angina pectoris    Elevated fasting glucose    Hypercalcemia    Mixed hyperlipidemia, baseline LDL not available    HTN (hypertension), benign    Rhinitis    Tear of retina without detachment - Right Eye    EPIPHORA    Dry eyes    Pseudophakia - Both Eyes    Vitamin D deficiency    Anemia    Difficulty walking    Lower extremity pain    Back pain    Nephrolithiasis    Hypertensive heart disease without heart failure    Anxiety    Stress    Hyponatremia    Obesity, Class I, BMI 30-34.9    Memory difficulties    Poor balance, onset 2014    Status post coronary artery stent placement    BMI 33.0-33.9,adult    Abdominal obesity    Sedentary lifestyle    Chronic bilateral low back pain with left-sided sciatica    Excessive daytime sleepiness    Hyperlipidemia    CKD (chronic kidney disease), stage III    Osteoarthritis of multiple joints    HEARING LOSS    Atypical chest pain, onset 2007    History of left-sided carotid endarterectomy    Dizziness, onset 1985    Bruit of right carotid artery    Gouty tophi    Statin intolerance    Generalized body aches, onset 2010    Cognitive dysfunction    Personal history of noncompliance with medical treatment, presenting hazards to health    Prerenal azotemia    Bradycardia    Chronic pain syndrome     Greater than 50% was spent in counseling and  coordination of care. The above assessment and plan have been discussed at length. Labs and procedure over the last 6 months reviewed. Problem List updated. Asked to bring in all active medications / pills bottles with next visit.

## 2020-10-15 ENCOUNTER — OFFICE VISIT (OUTPATIENT)
Dept: FAMILY MEDICINE | Facility: CLINIC | Age: 85
End: 2020-10-15
Payer: MEDICARE

## 2020-10-15 VITALS
DIASTOLIC BLOOD PRESSURE: 83 MMHG | HEART RATE: 70 BPM | WEIGHT: 181 LBS | SYSTOLIC BLOOD PRESSURE: 177 MMHG | BODY MASS INDEX: 30.9 KG/M2 | OXYGEN SATURATION: 98 % | TEMPERATURE: 98 F | HEIGHT: 64 IN

## 2020-10-15 DIAGNOSIS — R26.2 DIFFICULTY WALKING: ICD-10-CM

## 2020-10-15 DIAGNOSIS — G62.9 NEUROPATHY: ICD-10-CM

## 2020-10-15 DIAGNOSIS — I10 HTN (HYPERTENSION), BENIGN: ICD-10-CM

## 2020-10-15 DIAGNOSIS — E55.9 VITAMIN D DEFICIENCY: ICD-10-CM

## 2020-10-15 DIAGNOSIS — Z23 NEEDS FLU SHOT: Primary | ICD-10-CM

## 2020-10-15 DIAGNOSIS — N18.31 STAGE 3A CHRONIC KIDNEY DISEASE: ICD-10-CM

## 2020-10-15 PROCEDURE — 99999 PR PBB SHADOW E&M-EST. PATIENT-LVL IV: ICD-10-PCS | Mod: PBBFAC,,, | Performed by: FAMILY MEDICINE

## 2020-10-15 PROCEDURE — G0008 ADMIN INFLUENZA VIRUS VAC: HCPCS | Mod: S$GLB,,, | Performed by: FAMILY MEDICINE

## 2020-10-15 PROCEDURE — 99214 PR OFFICE/OUTPT VISIT, EST, LEVL IV, 30-39 MIN: ICD-10-PCS | Mod: 25,S$GLB,, | Performed by: FAMILY MEDICINE

## 2020-10-15 PROCEDURE — 90694 FLU VACCINE - QUADRIVALENT - ADJUVANTED: ICD-10-PCS | Mod: S$GLB,,, | Performed by: FAMILY MEDICINE

## 2020-10-15 PROCEDURE — 1159F MED LIST DOCD IN RCRD: CPT | Mod: S$GLB,,, | Performed by: FAMILY MEDICINE

## 2020-10-15 PROCEDURE — 1159F PR MEDICATION LIST DOCUMENTED IN MEDICAL RECORD: ICD-10-PCS | Mod: S$GLB,,, | Performed by: FAMILY MEDICINE

## 2020-10-15 PROCEDURE — G0008 FLU VACCINE - QUADRIVALENT - ADJUVANTED: ICD-10-PCS | Mod: S$GLB,,, | Performed by: FAMILY MEDICINE

## 2020-10-15 PROCEDURE — 99999 PR PBB SHADOW E&M-EST. PATIENT-LVL IV: CPT | Mod: PBBFAC,,, | Performed by: FAMILY MEDICINE

## 2020-10-15 PROCEDURE — 1126F AMNT PAIN NOTED NONE PRSNT: CPT | Mod: S$GLB,,, | Performed by: FAMILY MEDICINE

## 2020-10-15 PROCEDURE — 90694 VACC AIIV4 NO PRSRV 0.5ML IM: CPT | Mod: S$GLB,,, | Performed by: FAMILY MEDICINE

## 2020-10-15 PROCEDURE — 1101F PT FALLS ASSESS-DOCD LE1/YR: CPT | Mod: CPTII,S$GLB,, | Performed by: FAMILY MEDICINE

## 2020-10-15 PROCEDURE — 1101F PR PT FALLS ASSESS DOC 0-1 FALLS W/OUT INJ PAST YR: ICD-10-PCS | Mod: CPTII,S$GLB,, | Performed by: FAMILY MEDICINE

## 2020-10-15 PROCEDURE — 99214 OFFICE O/P EST MOD 30 MIN: CPT | Mod: 25,S$GLB,, | Performed by: FAMILY MEDICINE

## 2020-10-15 PROCEDURE — 1126F PR PAIN SEVERITY QUANTIFIED, NO PAIN PRESENT: ICD-10-PCS | Mod: S$GLB,,, | Performed by: FAMILY MEDICINE

## 2020-10-15 RX ORDER — GABAPENTIN 100 MG/1
100 CAPSULE ORAL NIGHTLY
Qty: 30 CAPSULE | Refills: 2 | Status: SHIPPED | OUTPATIENT
Start: 2020-10-15 | End: 2020-11-19 | Stop reason: SINTOL

## 2020-10-15 RX ORDER — ERGOCALCIFEROL 1.25 MG/1
50000 CAPSULE ORAL
Qty: 24 CAPSULE | Refills: 3 | Status: SHIPPED | OUTPATIENT
Start: 2020-10-16 | End: 2021-03-10 | Stop reason: SDUPTHER

## 2020-10-15 NOTE — PROGRESS NOTES
Subjective:       Patient ID: Yisel العلي is a 86 y.o. female.    Chief Complaint: Follow-up (Vit D deficiency)    Follow-up  Associated symptoms include arthralgias and numbness. Pertinent negatives include no chills, diaphoresis, fatigue, fever, joint swelling or myalgias.    Follow-up. Vitamin D very low on recent labs, admits she ran out of her ergocalciferol.    Would like the flu shot today.    Blood pressure elevated today, was normal at recent gynecology visit. States she forgot to take lisinopril this morning.    Having issues with neuropathy in her arms and legs, worse at night. Also having difficulty walking. Has been unable to get off the ground before after a fall. Would like to try physical therapy again.    Review of Systems   Constitutional: Negative for chills, diaphoresis, fatigue and fever.   Musculoskeletal: Positive for arthralgias and gait problem. Negative for joint swelling and myalgias.   Neurological: Positive for numbness. Negative for seizures, syncope and speech difficulty.       Past Medical History:   Diagnosis Date    Anticoagulant long-term use     Arthritis     CAD (coronary artery disease)     Cancer     skin    Cataract     CKD (chronic kidney disease), stage III     HEARING LOSS     Hematoma complicating a procedure 1213    ant abd wall    [place] (hard of hearing)     BILAT AIDS    Hyperlipidemia     Hypertension     Meniere disease     Pneumonia     Retinal detachment     not sure which eye    Vertigo     Wears glasses      Past Surgical History:   Procedure Laterality Date    carotid surgery      left endarterectomy    CATARACT EXTRACTION      ou    CORONARY STENT PLACEMENT      x1    HYSTERECTOMY      KNEE SURGERY Right     PARTIAL NEPHRECTOMY  1013    benign left kidney neoplasm    RETINAL DETACHMENT SURGERY       Social History     Socioeconomic History    Marital status:      Spouse name: Not on file    Number of children: Not on file     Years of education: Not on file    Highest education level: Not on file   Occupational History    Not on file   Social Needs    Financial resource strain: Not on file    Food insecurity     Worry: Not on file     Inability: Not on file    Transportation needs     Medical: Not on file     Non-medical: Not on file   Tobacco Use    Smoking status: Former Smoker    Smokeless tobacco: Never Used   Substance and Sexual Activity    Alcohol use: Yes     Comment: occasional    Drug use: No    Sexual activity: Not on file   Lifestyle    Physical activity     Days per week: Not on file     Minutes per session: Not on file    Stress: Not on file   Relationships    Social connections     Talks on phone: Not on file     Gets together: Not on file     Attends Mu-ism service: Not on file     Active member of club or organization: Not on file     Attends meetings of clubs or organizations: Not on file     Relationship status: Not on file   Other Topics Concern    Not on file   Social History Narrative    ADVANCED MD PLANS         Gyn Exam / Hysterectomy 10 Charu10/15/2019     Annual exam.  Chronic hypertension.  Obesity.  Hyperlipidemia.  Overactive bladder.  Incontinence.  Tenia corpora.  Epidermal inclusion cysts.    BMD at [facility].    Dr. Mark PCP.    Ditropan XL.    Triamcinolone cream.    Ketoconazole.        Visit Summary    Prescriptions:    SIG: ketoconazole 2 % topical shampoo, 1 days, Dispense #1 Bottle, 11 Refills    Directions: use once daily as directed    SIG: nystatin-triamcinolone 100,000-0.1 unit/gram-% topical ointment,  days, Dispense #120 Gram, 2 Refills    Directions: Apply 2 times a day to affected areas     Gyn Exam / Hysterectomy 10 Charu7/23/2018     Annual exam.  Obesity.  Chronic hypertension.  Hyperlipidemia.  Vaginal atrophy.  Incontinence.    Mammogram and bone mineral density at [facility].    Continue medications.    Reviewed old.  Cardiologist labs.     "Return to clinic for lipid panel and complete metabolic panel.    [facility].    Patient will followup with no cardiologist at [facility].    Visit Summary     GYN Visit & [id number] CHARU11/1/2016     Epidermal inclusion cysts.  Candidiasis in groin.  Cystocele.    Urine for culture and sensitivity.    Probiotics over-the-counter.    Continue Premarin cream when necessary.    Visit Summary    Prescriptions:    SIG: clobetasol 0.05 % cream, 120 days, Dispense #120 Container, 0 Refills    Directions: Apply at bedtime    SIG: nystatin 100,000 unit/gram powder,  days, Dispense #120 Bottle, 0 Refills    Directions: Apply 2 times a day to affected areas     GYN Exam/Wybzlobpfejh843/7/2016     Annual exam.  Lichen sclerosus.  Enterocele.  Rectocele.    Eurax: Betamethasone compounded prescription given.  Return to clinic in 2 months to assess condition.     Family History   Problem Relation Age of Onset    Cancer Father         ?    Heart failure Father     Amblyopia Neg Hx     Blindness Neg Hx     Cataracts Neg Hx     Diabetes Neg Hx     Glaucoma Neg Hx     Hypertension Neg Hx     Macular degeneration Neg Hx     Retinal detachment Neg Hx     Strabismus Neg Hx     Stroke Neg Hx     Thyroid disease Neg Hx        Objective:      BP (!) 177/83   Pulse 70   Temp 97.8 °F (36.6 °C) (Tympanic)   Ht 5' 4" (1.626 m)   Wt 82.1 kg (181 lb)   SpO2 98%   BMI 31.07 kg/m²   Physical Exam  Vitals signs reviewed.   Constitutional:       General: She is not in acute distress.     Appearance: She is not toxic-appearing or diaphoretic.   HENT:      Head: Normocephalic and atraumatic.   Eyes:      General: No scleral icterus.        Right eye: No discharge.         Left eye: No discharge.      Conjunctiva/sclera: Conjunctivae normal.   Skin:     General: Skin is warm and dry.      Coloration: Skin is not jaundiced.      Findings: No bruising.   Neurological:      Mental Status: She is alert.   Psychiatric:        "  Mood and Affect: Mood normal.         Behavior: Behavior normal.         Thought Content: Thought content normal.         Judgment: Judgment normal.         Assessment:       1. Needs flu shot    2. Vitamin D deficiency    3. HTN (hypertension), benign    4. Stage 3a chronic kidney disease    5. Neuropathy    6. Difficulty walking        Plan:       Flu shot given, physical therapy referral placed. Patient to try gabapentin in the evenings for her neuropathy. Advised patient increase her intake of water. F/u 4-6 weeks.    Needs flu shot  -     Influenza (FLUAD) - Quadrivalent (Adjuvanted) *Preferred* (65+) (PF)    Vitamin D deficiency  -     ergocalciferol (ERGOCALCIFEROL) 50,000 unit Cap; Take 1 capsule (50,000 Units total) by mouth every Monday and Friday.  Dispense: 24 capsule; Refill: 3    HTN (hypertension), benign    Stage 3a chronic kidney disease    Neuropathy  -     gabapentin (NEURONTIN) 100 MG capsule; Take 1 capsule (100 mg total) by mouth every evening.  Dispense: 30 capsule; Refill: 2    Difficulty walking  -     Ambulatory referral/consult to Physical/Occupational Therapy; Future; Expected date: 10/22/2020            Risks, benefits, and side effects were discussed with the patient. All questions were answered to the fullest satisfaction of the patient, and pt verbalized understanding and agreement to treatment plan. Pt was to call with any new or worsening symptoms, or present to the ER.

## 2020-10-26 ENCOUNTER — CLINICAL SUPPORT (OUTPATIENT)
Dept: REHABILITATION | Facility: HOSPITAL | Age: 85
End: 2020-10-26
Attending: FAMILY MEDICINE
Payer: MEDICARE

## 2020-10-26 DIAGNOSIS — R26.2 DIFFICULTY WALKING: Primary | ICD-10-CM

## 2020-10-26 PROCEDURE — 97535 SELF CARE MNGMENT TRAINING: CPT | Mod: PN

## 2020-10-26 PROCEDURE — 97161 PT EVAL LOW COMPLEX 20 MIN: CPT | Mod: PN

## 2020-10-26 NOTE — PLAN OF CARE
[facility]  Physical Therapy Initial Evaluation    Name: Yisel العلي  Clinic Number: [id number]    Therapy Diagnosis:   Encounter Diagnosis   Name Primary?    Difficulty walking Yes     Physician: Josefina Martinez DO    Physician Orders: PT Eval and Treat   Medical Diagnosis: difficulty walking   Evaluation Date: 10/26/2020  Authorization period Expiration: 12/31/2020   Plan of Care Certification Period: 01/26/2021    Visit #: 1/ Visits authorized: 12  Time In:  1:00 pm   Time Out: 1:50 pm   Total Billable Time: 50 minutes    Precautions: Standard    Subjective   Date of onset: chronic   Date of Surgery: n/a     Past Medical History:   Diagnosis Date    Anticoagulant long-term use     Arthritis     CAD (coronary artery disease)     Cancer     skin    Cataract     CKD (chronic kidney disease), stage III     HEARING LOSS     Hematoma complicating a procedure 1213    ant abd wall    [place] (hard of hearing)     BILAT AIDS    Hyperlipidemia     Hypertension     Meniere disease     Pneumonia     Retinal detachment     not sure which eye    Vertigo     Wears glasses      Yisel العلي  has a past surgical history that includes Coronary stent placement; carotid surgery; Hysterectomy; Cataract extraction; Retinal detachment surgery; Partial nephrectomy (1013); and Knee surgery (Right).    Yisel has a current medication list which includes the following prescription(s): ammonium lactate, aspirin, diaper,brief,adult,disposable, diazepam, ergocalciferol, gabapentin, ketoconazole, lisinopril, nitroglycerin 0.1 mg/hr td pt24, nystatin-triamcinolone, and oxybutynin.    Review of patient's allergies indicates:   Allergen Reactions    Codeine Other (See Comments)     dosent remember reaction;maybe nausea        Prior Therapy: no recent physical therapy for current condition  Social History: Patient lives in a single level home with 0 steps to enter   Occupation: retired   Prior Level  of Function: Ambulatory with use of cane and rollator   Current Level of Function: Independent; lives alone; using rollator/cane for ambulation       Pain:  Current: yisel stated she has aches in her legs that turn into pain at times;   Location: lower back and BLE's     Description: Aching, Burning and Grabbing  Aggravating Factors: Walking  Easing Factors: rest      Onset/BUFFY: chronic     History of current condition - Yisel reports: long  history of symptoms, including lower back pain, stenosis; She has been here to therapy before - approximately 3 years ago for lower back pain, balance, neuropathy; Yisel stated that she has been going to the local fitness center to get into the pool - is able to perform exercises pain free while in the water.  She is interested in learning some better exercises to perform in the water to help her balance.  She is unable to walk any significant distance without use of her cane or by holding onto walls;furniture; She is somewhat interested in working on her balance for land based activities, but also wants some better exercises for use in the aquatic environment.     Pts goals: To learn what she could do in the pool for exercise to help her balance and ability to walk; Yisel also wants to be able to get herself up from the floor should she fall.         Objective   Posture:  forward head, Wide NOLBERTO;     Gait: Abnormal - wide NOLBERTO; antalgic gait pattern with increased lateral trunk flexion   Turns: wide turns only - able to maintain balance.   SLS: able to lift opposite leg and maintain x 3 secs;   Tandem Stance: able to bring foot slightly ahead of the other - wide stance; closing in her stance creates a LOB       Range of Motion:   Yisel demonstrates functional AROM in UE/LE   Lumbar flexion - functional with hands to top of feet; side-bending restricted right and left to 50% - requires some rotation in addition to SB; Extension - cannot extend in standing with UE support (LOB)        Upper Extremity Strength  (R) UE  (L) UE    Shoulder flexion: 4+/5 Shoulder flexion: 4+/5   Shoulder Abduction: 4-/5 Shoulder abduction: 4-/5   Shoulder ER 4/5 Shoulder ER 4/5   Shoulder IR 5/5 Shoulder IR 5/5   Elbow Flexion 4/5 Elbow Flexion 4/5   Elbow Extension 4/5 Elbow Extension 4/5   Wrist Flexion 4/5 Wrist Flexion 4/5   Wrist Extension 4/5 Wrist Extension 4/5      equal, strong bilaterally     Lower Extremity Strength  Right LE  Left LE    Knee extension: 4/5 Knee extension: 4/5   Knee flexion: 4/5 Knee flexion: 4/5   Hip flexion: 4/5 Hip flexion: 4/5   Hip extension:  3+/5 Hip extension: 3+/5   Hip abduction: 4-/5 Hip abduction: 4-/5   Hip adduction: 4-/5 Hip adduction 4-/5   Ankle dorsiflexion: 4-/5 Ankle dorsiflexion: 4-/5   Ankle plantarflexion: 3+/5 Ankle plantarflexion: 3+/5     Sensation: diminished to light touch in bilateral feet and ankles (peripheral neuropathy)      PT Evaluation Completed? Yes  Discussed Plan of Care with patient: Yes    TREATMENT   Treatment Time In: 1:30 pm   Treatment Time Out: 1:50 pm   Total Treatment time separate from Evaluation: 20 minutes    Yisel participated in dynamic functional therapeutic activities to improve functional performance for 20  minutes, including:  Yisel wanted to learn how to get herself up off of the floor in case she where to fall at home; Explained procuress to her, demonstrated how to crawl of scoot herself to somewhere in her home where she would have a piece of furniture to help pull herself up with; Yisel stated that she just couldn't kneel on her Right knee due to surgery in the past;  Had Yisel get down on the floor next to table mat; she was able to practice scooting along the mat; able to get herself from long sitting, over onto her left knee - than used the mat table to pull herself up with (CGA of PT as well).        Home Exercises and Patient Education Provided    Education provided re:   - progress towards goals   - role of  therapy in multi - disciplinary team, goals for therapy  Pt educated on condition, POC, and expectations in therapy.  No spiritual or educational barriers to learning provided    Home exercises:  Yisel was given written H/O with some aquatic exercises   Pt will be provided HEP during course of treatment with progressions as appropriate. Pt was advised to perform these exercises free of pain, and to stop performing them if pain occurs.   Yisel demonstrated good  understanding of the education provided.       Functional Limitations Reports -   Tool: LEFS   Score: 50% maximum function         Assessment   Yisel is a 86 y.o. female referred to outpatient physical therapy with a medical diagnosis of gait instability, and presents to PT with difficulty walking as result of lumbar stenosis/peripheral neuropathy . Patient demonstrates limitations as described in the problem list. Pt will benefit from physcial therapy services in order to provide patient with updated aquatic exercises and compensatory strategies to improve balance/stability.  The following goals were discussed with the patient and patient is in agreement with them as to be addressed in the treatment plan.   Pt prognosis is Good.   Pt will benefit from skilled outpatient Physical Therapy to address the deficits stated above and in the chart below, provide pt/family education, and to maximize pt's level of independence.     Plan of care discussed with patient: Yes  Pt's spiritual, cultural and educational needs considered and pt agreeable to plan of care and goals as stated below:     Anticipated Barriers for therapy: none    Medical necessity is demonstrated by the following IMPAIRMENTS/PROBLEM LIST:    weakness, impaired endurance, impaired sensation, impaired functional mobility, gait instability, impaired balance, decreased lower extremity function, pain and decreased ROM    Low complexity      Goals     Long Term Goals: 6 weeks  Pain: No increase in pain  with activity   Strength: Improve strength in core muscles by 1/2 grade for improved lumbopelvic stability  ROM: Maintain current ROM - functional.   Functional scale: Improve score on LEFS to > 50% mac function   Walking: Increase walking distance/duration to 500ft without pain  Postures: Increase sitting and/or standing duration to 20 mins without pain   Transfers: Perform all transfers without increased pain or limitation  Exercise: demonstrate independence with home exercise program to maintain gains made in therapy.          Plan   Certification Period: 10/26/2020 to 01/26/2021.    Outpatient Physical Therapy 1 times weekly for 6 weeks to include the following interventions: patient education, Neuromuscular Re-ed, Patient Education and Therapeutic Exercise.   Pt may be seen by PTA as part of the rehabilitation team.     I certify the need for these services furnished under this plan of treatment and while under my care.    Sandee Mujica, PT        Attestation:   I have seen the patient, reviewed the therapist's plan of care, and I agree with the plan of care.   I certify the need for these services furnished under this plan of treatment and while under my care.         _______________            ________                                               _____________________  Physician/Referring Practitioner                                                            Date of Signature

## 2020-11-12 ENCOUNTER — CLINICAL SUPPORT (OUTPATIENT)
Dept: REHABILITATION | Facility: HOSPITAL | Age: 85
End: 2020-11-12
Attending: FAMILY MEDICINE
Payer: MEDICARE

## 2020-11-12 DIAGNOSIS — R26.2 DIFFICULTY WALKING: ICD-10-CM

## 2020-11-12 DIAGNOSIS — G89.4 CHRONIC PAIN SYNDROME: Primary | ICD-10-CM

## 2020-11-12 PROCEDURE — 97140 MANUAL THERAPY 1/> REGIONS: CPT | Mod: PN

## 2020-11-12 PROCEDURE — 97110 THERAPEUTIC EXERCISES: CPT | Mod: PN

## 2020-11-12 NOTE — PROGRESS NOTES
Physical Therapy Daily Note     Name: Yisel [facility]  Clinic Number: [id number]  Diagnosis:   Encounter Diagnoses   Name Primary?    Difficulty walking     Chronic pain syndrome Yes     Physician: Josefina Martinez DO  Precautions: standard   Visit #: 2 of 12  PTA Visit #: 0  Time In: 1:45 pm   Time Out: 2:35 pm     Subjective     Pt reports: I have only been to the pool 1x since you gave me the exercises;   Pain Scale: Yisel rates pain on a scale of 0-10 to be 4 currently.    Objective     Yisel received individual therapeutic exercises to develop ROM, flexibility, posture and core stabilization for 30 minutes including:  H/S stretches x 30 secs   Piriformis stretch 3 x each leg   TrAb activation with transition to pelvic tilts   Bridges   SLR  Wall Slides   Wall push-ups     Yisel received the following manual therapy techniques: Joint mobilizations and Soft tissue Mobilization were applied to the: 10 for lumbar, BLE minutes including:  S/L flexion to right and left lumbar spine; STM to lower extremities to decrease hypersensitivity in lower legs due to neuropathy.      The patient received the following direct contact modalities after being cleared for contraindications:     The patient received the following supervised modalities after being cleared for contradictions:     Written Home Exercises Provided:   Gave Yisel written HEP for aquatic exercises , written exercises noted above for performance at home.   Pt demo good understanding of the education provided. Yisel demonstrated good return demonstration of activities.     Education provided re:  Yisel verbalized good understanding of education provided.   No spiritual or educational barriers to learning provided    Assessment     Patient tolerated treatment well; Yisel has multiple c/o's of leg, back, shoulder pain; wants to work on overall strengthening and hopes to get back in the pool soon.   This is a 86  y.o. female referred to outpatient physical therapy and presents with a medical diagnosis of gait inability  and demonstrates limitations as described in the problem list. Pt prognosis is Good. Pt will continue to benefit from skilled outpatient physical therapy to address the deficits listed in the problem list, provide pt/family education and to maximize pt's level of independence in the home and community environment.     Goals as follows:    Long Term Goals: 6 weeks  Pain: No increase in pain with activity   Strength: Improve strength in core muscles by 1/2 grade for improved lumbopelvic stability  ROM: Maintain current ROM - functional.   Functional scale: Improve score on LEFS to > 50% mac function   Walking: Increase walking distance/duration to 500ft without pain  Postures: Increase sitting and/or standing duration to 20 mins without pain   Transfers: Perform all transfers without increased pain or limitation  Exercise: demonstrate independence with home exercise program to maintain gains made in therapy.         Plan     Continue with established Plan of Care towards PT goals.    Therapist: Sandee Mujica, PT  11/12/2020

## 2020-11-19 ENCOUNTER — OFFICE VISIT (OUTPATIENT)
Dept: FAMILY MEDICINE | Facility: CLINIC | Age: 85
End: 2020-11-19
Payer: MEDICARE

## 2020-11-19 VITALS
WEIGHT: 180 LBS | HEART RATE: 82 BPM | DIASTOLIC BLOOD PRESSURE: 76 MMHG | SYSTOLIC BLOOD PRESSURE: 130 MMHG | TEMPERATURE: 98 F | OXYGEN SATURATION: 96 % | BODY MASS INDEX: 30.73 KG/M2 | HEIGHT: 64 IN

## 2020-11-19 DIAGNOSIS — R26.89 POOR BALANCE: Primary | ICD-10-CM

## 2020-11-19 PROCEDURE — 1126F PR PAIN SEVERITY QUANTIFIED, NO PAIN PRESENT: ICD-10-PCS | Mod: S$GLB,,, | Performed by: FAMILY MEDICINE

## 2020-11-19 PROCEDURE — 1159F PR MEDICATION LIST DOCUMENTED IN MEDICAL RECORD: ICD-10-PCS | Mod: S$GLB,,, | Performed by: FAMILY MEDICINE

## 2020-11-19 PROCEDURE — 99213 OFFICE O/P EST LOW 20 MIN: CPT | Mod: S$GLB,,, | Performed by: FAMILY MEDICINE

## 2020-11-19 PROCEDURE — 1159F MED LIST DOCD IN RCRD: CPT | Mod: S$GLB,,, | Performed by: FAMILY MEDICINE

## 2020-11-19 PROCEDURE — 99999 PR PBB SHADOW E&M-EST. PATIENT-LVL IV: ICD-10-PCS | Mod: PBBFAC,,, | Performed by: FAMILY MEDICINE

## 2020-11-19 PROCEDURE — 1126F AMNT PAIN NOTED NONE PRSNT: CPT | Mod: S$GLB,,, | Performed by: FAMILY MEDICINE

## 2020-11-19 PROCEDURE — 1101F PR PT FALLS ASSESS DOC 0-1 FALLS W/OUT INJ PAST YR: ICD-10-PCS | Mod: CPTII,S$GLB,, | Performed by: FAMILY MEDICINE

## 2020-11-19 PROCEDURE — 99213 PR OFFICE/OUTPT VISIT, EST, LEVL III, 20-29 MIN: ICD-10-PCS | Mod: S$GLB,,, | Performed by: FAMILY MEDICINE

## 2020-11-19 PROCEDURE — 3288F PR FALLS RISK ASSESSMENT DOCUMENTED: ICD-10-PCS | Mod: CPTII,S$GLB,, | Performed by: FAMILY MEDICINE

## 2020-11-19 PROCEDURE — 3288F FALL RISK ASSESSMENT DOCD: CPT | Mod: CPTII,S$GLB,, | Performed by: FAMILY MEDICINE

## 2020-11-19 PROCEDURE — 99999 PR PBB SHADOW E&M-EST. PATIENT-LVL IV: CPT | Mod: PBBFAC,,, | Performed by: FAMILY MEDICINE

## 2020-11-19 PROCEDURE — 1101F PT FALLS ASSESS-DOCD LE1/YR: CPT | Mod: CPTII,S$GLB,, | Performed by: FAMILY MEDICINE

## 2020-11-19 NOTE — PROGRESS NOTES
Subjective:       Patient ID: Yisel العلي is a 86 y.o. female.    Chief Complaint: Annual Exam    HPI   Follow-up. Has had a couple sessions of PT and is doing some exercises at home. Forgot to  a B12 supplement. Didn't start the gabapentin due to remembering she had a side effect in the past.    Review of Systems   Constitutional: Negative for chills, diaphoresis, fatigue and fever.   Respiratory: Negative for cough, shortness of breath, wheezing and stridor.    Cardiovascular: Negative for chest pain, palpitations and leg swelling.   Skin: Negative for color change, pallor, rash and wound.       Past Medical History:   Diagnosis Date    Anticoagulant long-term use     Arthritis     CAD (coronary artery disease)     Cancer     skin    Cataract     CKD (chronic kidney disease), stage III     HEARING LOSS     Hematoma complicating a procedure 1213    ant abd wall    [place] (hard of hearing)     BILAT AIDS    Hyperlipidemia     Hypertension     Meniere disease     Pneumonia     Retinal detachment     not sure which eye    Vertigo     Wears glasses      Past Surgical History:   Procedure Laterality Date    carotid surgery      left endarterectomy    CATARACT EXTRACTION      ou    CORONARY STENT PLACEMENT      x1    HYSTERECTOMY      KNEE SURGERY Right     PARTIAL NEPHRECTOMY  1013    benign left kidney neoplasm    RETINAL DETACHMENT SURGERY       Social History     Socioeconomic History    Marital status:      Spouse name: Not on file    Number of children: Not on file    Years of education: Not on file    Highest education level: Not on file   Occupational History    Not on file   Social Needs    Financial resource strain: Not on file    Food insecurity     Worry: Not on file     Inability: Not on file    Transportation needs     Medical: Not on file     Non-medical: Not on file   Tobacco Use    Smoking status: Former Smoker    Smokeless tobacco: Never Used   Substance  and Sexual Activity    Alcohol use: Yes     Comment: occasional    Drug use: No    Sexual activity: Not on file   Lifestyle    Physical activity     Days per week: Not on file     Minutes per session: Not on file    Stress: Not on file   Relationships    Social connections     Talks on phone: Not on file     Gets together: Not on file     Attends Uats[employer] service: Not on file     Active member of club or organization: Not on file     Attends meetings of clubs or organizations: Not on file     Relationship status: Not on file   Other Topics Concern    Not on file   Social History Narrative    ADVANCED MD PLANS         Gyn Exam / Hysterectomy 10 [facility]0/15/2019     Annual exam.  Chronic hypertension.  Obesity.  Hyperlipidemia.  Overactive bladder.  Incontinence.  Tenia corpora.  Epidermal inclusion cysts.    BMD at [facility].    Dr. Mark PCP.    Lucietropajennifer CANALES.    Triamcinolone cream.    Ketoconazole.        Visit Summary    Prescriptions:    SIG: ketoconazole 2 % topical shampoo, 1 days, Dispense #1 Bottle, 11 Refills    Directions: use once daily as directed    SIG: nystatin-triamcinolone 100,000-0.1 unit/gram-% topical ointment,  days, Dispense #120 Gram, 2 Refills    Directions: Apply 2 times a day to affected areas     Gyn Exam / Hysterectomy 10 [facility]7/23/2018     Annual exam.  Obesity.  Chronic hypertension.  Hyperlipidemia.  Vaginal atrophy.  Incontinence.    Mammogram and bone mineral density at [facility].    Continue medications.    Reviewed old.  Cardiologist labs.    Return to clinic for lipid panel and complete metabolic panel.    Shorter-guard.    Patient will followup with no cardiologist at [facility].    Visit Summary     [facility]     Epidermal inclusion cysts.  Candidiasis in groin.  Cystocele.    Urine for culture and sensitivity.    Probiotics over-the-counter.    Continue Premarin cream when necessary.    Visit Summary     "Prescriptions:    SIG: clobetasol 0.05 % cream, 120 days, Dispense #120 Container, 0 Refills    Directions: Apply at bedtime    SIG: nystatin 100,000 unit/gram powder,  days, Dispense #120 Bottle, 0 Refills    Directions: Apply 2 times a day to affected areas     GYN Exam/Yeekjrkzmgzg536/7/2016     Annual exam.  Lichen sclerosus.  Enterocele.  Rectocele.    Eurax: Betamethasone compounded prescription given.  Return to clinic in 2 months to assess condition.     Family History   Problem Relation Age of Onset    Cancer Father         ?    Heart failure Father     Amblyopia Neg Hx     Blindness Neg Hx     Cataracts Neg Hx     Diabetes Neg Hx     Glaucoma Neg Hx     Hypertension Neg Hx     Macular degeneration Neg Hx     Retinal detachment Neg Hx     Strabismus Neg Hx     Stroke Neg Hx     Thyroid disease Neg Hx        Objective:      /76   Pulse 82   Temp 97.8 °F (36.6 °C) (Tympanic)   Ht 5' 4" (1.626 m)   Wt 81.6 kg (180 lb)   SpO2 96%   BMI 30.90 kg/m²   Physical Exam  Vitals signs reviewed.   Constitutional:       General: She is not in acute distress.     Appearance: She is normal weight. She is not toxic-appearing.   HENT:      Head: Normocephalic and atraumatic.   Eyes:      General: No scleral icterus.        Right eye: No discharge.         Left eye: No discharge.      Conjunctiva/sclera: Conjunctivae normal.   Neurological:      Mental Status: She is alert.   Psychiatric:         Mood and Affect: Mood normal.         Behavior: Behavior normal.         Thought Content: Thought content normal.         Judgment: Judgment normal.         Assessment:       1. Poor balance, onset 2014        Plan:       Poor balance, onset 2014  - continue home exercises  - resume taking vitamin D, add otc vitamin B12  - f/u 6 weeks          Risks, benefits, and side effects were discussed with the patient. All questions were answered to the fullest satisfaction of the patient, and pt verbalized " understanding and agreement to treatment plan. Pt was to call with any new or worsening symptoms, or present to the ER.

## 2020-12-26 ENCOUNTER — NURSE TRIAGE (OUTPATIENT)
Dept: ADMINISTRATIVE | Facility: CLINIC | Age: 85
End: 2020-12-26

## 2020-12-26 NOTE — TELEPHONE ENCOUNTER
Reason for Disposition   Second attempt to contact family AND no contact made.  Phone number verified.    Additional Information   Negative: Caller is angry or rude (e.g., hangs up, verbally abusive, yelling)   Negative: Caller hangs up   Negative: Caller has already spoken with the PCP and has no further questions.   Negative: Caller has already spoken with another triager and has no further questions.   Negative: Caller has already spoken with another triager or PCP AND has further questions AND triager able to answer questions.   Negative: Wrong number reached.  Phone number verified.   Negative: Message left with person in household.   Negative: Message left on unidentified voice mail.  Phone number verified.   Negative: Message left on identified voice mail   Negative: No answer.  First attempt to contact caller.  Follow-up call scheduled within 15 minutes.   Negative: Busy signal.  First attempt to contact caller.  Follow-up call scheduled within 15 minutes.    Protocols used: NO CONTACT OR DUPLICATE CONTACT CALL-A-  call cannot be completed at this time x2 at 222pm at  [phone]

## 2021-01-07 ENCOUNTER — OFFICE VISIT (OUTPATIENT)
Dept: FAMILY MEDICINE | Facility: CLINIC | Age: 86
End: 2021-01-07
Payer: MEDICARE

## 2021-01-07 VITALS
DIASTOLIC BLOOD PRESSURE: 66 MMHG | TEMPERATURE: 98 F | RESPIRATION RATE: 14 BRPM | HEIGHT: 64 IN | WEIGHT: 177 LBS | HEART RATE: 63 BPM | OXYGEN SATURATION: 97 % | BODY MASS INDEX: 30.22 KG/M2 | SYSTOLIC BLOOD PRESSURE: 158 MMHG

## 2021-01-07 DIAGNOSIS — R11.0 NAUSEA: Primary | ICD-10-CM

## 2021-01-07 DIAGNOSIS — R05.9 COUGH: ICD-10-CM

## 2021-01-07 DIAGNOSIS — F41.8 SITUATIONAL ANXIETY: ICD-10-CM

## 2021-01-07 PROCEDURE — 3288F PR FALLS RISK ASSESSMENT DOCUMENTED: ICD-10-PCS | Mod: CPTII,S$GLB,, | Performed by: FAMILY MEDICINE

## 2021-01-07 PROCEDURE — 99999 PR PBB SHADOW E&M-EST. PATIENT-LVL IV: CPT | Mod: PBBFAC,,, | Performed by: FAMILY MEDICINE

## 2021-01-07 PROCEDURE — 1101F PR PT FALLS ASSESS DOC 0-1 FALLS W/OUT INJ PAST YR: ICD-10-PCS | Mod: CPTII,S$GLB,, | Performed by: FAMILY MEDICINE

## 2021-01-07 PROCEDURE — 99214 OFFICE O/P EST MOD 30 MIN: CPT | Mod: S$GLB,,, | Performed by: FAMILY MEDICINE

## 2021-01-07 PROCEDURE — 1159F MED LIST DOCD IN RCRD: CPT | Mod: S$GLB,,, | Performed by: FAMILY MEDICINE

## 2021-01-07 PROCEDURE — 3288F FALL RISK ASSESSMENT DOCD: CPT | Mod: CPTII,S$GLB,, | Performed by: FAMILY MEDICINE

## 2021-01-07 PROCEDURE — 1126F PR PAIN SEVERITY QUANTIFIED, NO PAIN PRESENT: ICD-10-PCS | Mod: S$GLB,,, | Performed by: FAMILY MEDICINE

## 2021-01-07 PROCEDURE — 99999 PR PBB SHADOW E&M-EST. PATIENT-LVL IV: ICD-10-PCS | Mod: PBBFAC,,, | Performed by: FAMILY MEDICINE

## 2021-01-07 PROCEDURE — 1101F PT FALLS ASSESS-DOCD LE1/YR: CPT | Mod: CPTII,S$GLB,, | Performed by: FAMILY MEDICINE

## 2021-01-07 PROCEDURE — 99214 PR OFFICE/OUTPT VISIT, EST, LEVL IV, 30-39 MIN: ICD-10-PCS | Mod: S$GLB,,, | Performed by: FAMILY MEDICINE

## 2021-01-07 PROCEDURE — 1126F AMNT PAIN NOTED NONE PRSNT: CPT | Mod: S$GLB,,, | Performed by: FAMILY MEDICINE

## 2021-01-07 PROCEDURE — 1159F PR MEDICATION LIST DOCUMENTED IN MEDICAL RECORD: ICD-10-PCS | Mod: S$GLB,,, | Performed by: FAMILY MEDICINE

## 2021-01-07 RX ORDER — GABAPENTIN 100 MG/1
CAPSULE ORAL
COMMUNITY
Start: 2020-12-04 | End: 2021-04-21

## 2021-01-07 RX ORDER — BENZONATATE 100 MG/1
100 CAPSULE ORAL 3 TIMES DAILY PRN
Qty: 30 CAPSULE | Refills: 0 | Status: SHIPPED | OUTPATIENT
Start: 2021-01-07 | End: 2021-01-17

## 2021-01-07 RX ORDER — PROMETHAZINE HYDROCHLORIDE 12.5 MG/1
12.5 TABLET ORAL 2 TIMES DAILY PRN
Qty: 20 TABLET | Refills: 0 | Status: SHIPPED | OUTPATIENT
Start: 2021-01-07 | End: 2021-01-17

## 2021-01-07 RX ORDER — DIAZEPAM 2 MG/1
2 TABLET ORAL DAILY PRN
Qty: 30 TABLET | Refills: 0 | Status: SHIPPED | OUTPATIENT
Start: 2021-01-07 | End: 2021-09-02

## 2021-01-08 ENCOUNTER — LAB VISIT (OUTPATIENT)
Dept: FAMILY MEDICINE | Facility: CLINIC | Age: 86
End: 2021-01-08
Payer: MEDICARE

## 2021-01-08 DIAGNOSIS — R05.9 COUGH: ICD-10-CM

## 2021-01-08 PROCEDURE — U0003 INFECTIOUS AGENT DETECTION BY NUCLEIC ACID (DNA OR RNA); SEVERE ACUTE RESPIRATORY SYNDROME CORONAVIRUS 2 (SARS-COV-2) (CORONAVIRUS DISEASE [COVID-19]), AMPLIFIED PROBE TECHNIQUE, MAKING USE OF HIGH THROUGHPUT TECHNOLOGIES AS DESCRIBED BY CMS-2020-01-R: HCPCS

## 2021-01-09 LAB — SARS-COV-2 RNA RESP QL NAA+PROBE: NOT DETECTED

## 2021-01-20 ENCOUNTER — PATIENT MESSAGE (OUTPATIENT)
Dept: FAMILY MEDICINE | Facility: CLINIC | Age: 86
End: 2021-01-20

## 2021-01-22 ENCOUNTER — PATIENT MESSAGE (OUTPATIENT)
Dept: ADMINISTRATIVE | Facility: OTHER | Age: 86
End: 2021-01-22

## 2021-03-09 ENCOUNTER — TELEPHONE (OUTPATIENT)
Dept: FAMILY MEDICINE | Facility: CLINIC | Age: 86
End: 2021-03-09

## 2021-03-10 ENCOUNTER — OFFICE VISIT (OUTPATIENT)
Dept: FAMILY MEDICINE | Facility: CLINIC | Age: 86
End: 2021-03-10
Payer: MEDICARE

## 2021-03-10 VITALS
RESPIRATION RATE: 16 BRPM | DIASTOLIC BLOOD PRESSURE: 71 MMHG | BODY MASS INDEX: 30.38 KG/M2 | OXYGEN SATURATION: 98 % | SYSTOLIC BLOOD PRESSURE: 178 MMHG | HEIGHT: 64 IN | HEART RATE: 51 BPM

## 2021-03-10 DIAGNOSIS — M25.50 ARTHRALGIA, UNSPECIFIED JOINT: Primary | ICD-10-CM

## 2021-03-10 DIAGNOSIS — N20.0 NEPHROLITHIASIS: ICD-10-CM

## 2021-03-10 DIAGNOSIS — E55.9 VITAMIN D DEFICIENCY: ICD-10-CM

## 2021-03-10 PROCEDURE — 1159F MED LIST DOCD IN RCRD: CPT | Mod: S$GLB,,, | Performed by: FAMILY MEDICINE

## 2021-03-10 PROCEDURE — 99214 PR OFFICE/OUTPT VISIT, EST, LEVL IV, 30-39 MIN: ICD-10-PCS | Mod: S$GLB,,, | Performed by: FAMILY MEDICINE

## 2021-03-10 PROCEDURE — 99999 PR PBB SHADOW E&M-EST. PATIENT-LVL IV: CPT | Mod: PBBFAC,,, | Performed by: FAMILY MEDICINE

## 2021-03-10 PROCEDURE — 1159F PR MEDICATION LIST DOCUMENTED IN MEDICAL RECORD: ICD-10-PCS | Mod: S$GLB,,, | Performed by: FAMILY MEDICINE

## 2021-03-10 PROCEDURE — 99999 PR PBB SHADOW E&M-EST. PATIENT-LVL IV: ICD-10-PCS | Mod: PBBFAC,,, | Performed by: FAMILY MEDICINE

## 2021-03-10 PROCEDURE — 99214 OFFICE O/P EST MOD 30 MIN: CPT | Mod: S$GLB,,, | Performed by: FAMILY MEDICINE

## 2021-03-10 RX ORDER — ERGOCALCIFEROL 1.25 MG/1
50000 CAPSULE ORAL
Qty: 6 CAPSULE | Refills: 3 | Status: SHIPPED | OUTPATIENT
Start: 2021-03-12 | End: 2021-05-13 | Stop reason: SDUPTHER

## 2021-03-24 ENCOUNTER — CLINICAL SUPPORT (OUTPATIENT)
Dept: FAMILY MEDICINE | Facility: CLINIC | Age: 86
End: 2021-03-24
Payer: MEDICARE

## 2021-03-24 ENCOUNTER — TELEPHONE (OUTPATIENT)
Dept: FAMILY MEDICINE | Facility: CLINIC | Age: 86
End: 2021-03-24

## 2021-03-24 VITALS
HEART RATE: 54 BPM | OXYGEN SATURATION: 97 % | RESPIRATION RATE: 18 BRPM | TEMPERATURE: 97 F | SYSTOLIC BLOOD PRESSURE: 131 MMHG | DIASTOLIC BLOOD PRESSURE: 64 MMHG

## 2021-03-24 PROCEDURE — 99999 PR PBB SHADOW E&M-EST. PATIENT-LVL III: CPT | Mod: PBBFAC,,,

## 2021-03-24 PROCEDURE — 99999 PR PBB SHADOW E&M-EST. PATIENT-LVL III: ICD-10-PCS | Mod: PBBFAC,,,

## 2021-03-24 RX ORDER — ALLOPURINOL 100 MG/1
100 TABLET ORAL DAILY
Qty: 90 TABLET | Refills: 0 | Status: SHIPPED | OUTPATIENT
Start: 2021-03-24 | End: 2021-05-13 | Stop reason: ALTCHOICE

## 2021-03-24 RX ORDER — ALLOPURINOL 100 MG/1
100 TABLET ORAL DAILY
Qty: 90 TABLET | Refills: 0 | Status: SHIPPED | OUTPATIENT
Start: 2021-03-24 | End: 2021-03-24 | Stop reason: SDUPTHER

## 2021-04-06 ENCOUNTER — OFFICE VISIT (OUTPATIENT)
Dept: CARDIOLOGY | Facility: CLINIC | Age: 86
End: 2021-04-06
Payer: MEDICARE

## 2021-04-06 VITALS
HEIGHT: 64 IN | RESPIRATION RATE: 16 BRPM | HEART RATE: 63 BPM | DIASTOLIC BLOOD PRESSURE: 68 MMHG | OXYGEN SATURATION: 98 % | BODY MASS INDEX: 30.38 KG/M2 | SYSTOLIC BLOOD PRESSURE: 130 MMHG

## 2021-04-06 DIAGNOSIS — Z91.89 SEDENTARY LIFESTYLE: ICD-10-CM

## 2021-04-06 DIAGNOSIS — E78.2 MIXED HYPERLIPIDEMIA: ICD-10-CM

## 2021-04-06 DIAGNOSIS — H90.6 MIXED CONDUCTIVE AND SENSORINEURAL HEARING LOSS OF BOTH EARS: ICD-10-CM

## 2021-04-06 DIAGNOSIS — F09 COGNITIVE DYSFUNCTION: ICD-10-CM

## 2021-04-06 DIAGNOSIS — N18.31 STAGE 3A CHRONIC KIDNEY DISEASE: ICD-10-CM

## 2021-04-06 DIAGNOSIS — G47.19 EXCESSIVE DAYTIME SLEEPINESS: ICD-10-CM

## 2021-04-06 DIAGNOSIS — Z91.199 PERSONAL HISTORY OF NONCOMPLIANCE WITH MEDICAL TREATMENT, PRESENTING HAZARDS TO HEALTH: ICD-10-CM

## 2021-04-06 DIAGNOSIS — R00.1 BRADYCARDIA: ICD-10-CM

## 2021-04-06 DIAGNOSIS — Z78.9 STATIN INTOLERANCE: ICD-10-CM

## 2021-04-06 DIAGNOSIS — R42 DIZZINESS: ICD-10-CM

## 2021-04-06 DIAGNOSIS — Z95.5 STATUS POST CORONARY ARTERY STENT PLACEMENT: Primary | ICD-10-CM

## 2021-04-06 PROCEDURE — 99999 PR PBB SHADOW E&M-EST. PATIENT-LVL IV: ICD-10-PCS | Mod: PBBFAC,,, | Performed by: INTERNAL MEDICINE

## 2021-04-06 PROCEDURE — 99214 OFFICE O/P EST MOD 30 MIN: CPT | Mod: 25,S$GLB,, | Performed by: INTERNAL MEDICINE

## 2021-04-06 PROCEDURE — 1159F MED LIST DOCD IN RCRD: CPT | Mod: S$GLB,,, | Performed by: INTERNAL MEDICINE

## 2021-04-06 PROCEDURE — 99999 PR PBB SHADOW E&M-EST. PATIENT-LVL IV: CPT | Mod: PBBFAC,,, | Performed by: INTERNAL MEDICINE

## 2021-04-06 PROCEDURE — 93000 EKG 12-LEAD: ICD-10-PCS | Mod: S$GLB,,, | Performed by: INTERNAL MEDICINE

## 2021-04-06 PROCEDURE — 1126F PR PAIN SEVERITY QUANTIFIED, NO PAIN PRESENT: ICD-10-PCS | Mod: S$GLB,,, | Performed by: INTERNAL MEDICINE

## 2021-04-06 PROCEDURE — 3288F FALL RISK ASSESSMENT DOCD: CPT | Mod: CPTII,S$GLB,, | Performed by: INTERNAL MEDICINE

## 2021-04-06 PROCEDURE — 93000 ELECTROCARDIOGRAM COMPLETE: CPT | Mod: S$GLB,,, | Performed by: INTERNAL MEDICINE

## 2021-04-06 PROCEDURE — 1101F PR PT FALLS ASSESS DOC 0-1 FALLS W/OUT INJ PAST YR: ICD-10-PCS | Mod: CPTII,S$GLB,, | Performed by: INTERNAL MEDICINE

## 2021-04-06 PROCEDURE — 1159F PR MEDICATION LIST DOCUMENTED IN MEDICAL RECORD: ICD-10-PCS | Mod: S$GLB,,, | Performed by: INTERNAL MEDICINE

## 2021-04-06 PROCEDURE — 1126F AMNT PAIN NOTED NONE PRSNT: CPT | Mod: S$GLB,,, | Performed by: INTERNAL MEDICINE

## 2021-04-06 PROCEDURE — 3288F PR FALLS RISK ASSESSMENT DOCUMENTED: ICD-10-PCS | Mod: CPTII,S$GLB,, | Performed by: INTERNAL MEDICINE

## 2021-04-06 PROCEDURE — 99214 PR OFFICE/OUTPT VISIT, EST, LEVL IV, 30-39 MIN: ICD-10-PCS | Mod: 25,S$GLB,, | Performed by: INTERNAL MEDICINE

## 2021-04-06 PROCEDURE — 1101F PT FALLS ASSESS-DOCD LE1/YR: CPT | Mod: CPTII,S$GLB,, | Performed by: INTERNAL MEDICINE

## 2021-04-06 RX ORDER — EZETIMIBE 10 MG/1
10 TABLET ORAL DAILY
COMMUNITY
End: 2021-10-07

## 2021-04-08 ENCOUNTER — TELEPHONE (OUTPATIENT)
Dept: FAMILY MEDICINE | Facility: CLINIC | Age: 86
End: 2021-04-08

## 2021-04-08 ENCOUNTER — TELEPHONE (OUTPATIENT)
Dept: NEUROLOGY | Facility: CLINIC | Age: 86
End: 2021-04-08

## 2021-04-08 DIAGNOSIS — R41.3 MEMORY DIFFICULTIES: Primary | ICD-10-CM

## 2021-04-08 DIAGNOSIS — F09 COGNITIVE DYSFUNCTION: ICD-10-CM

## 2021-04-21 ENCOUNTER — OFFICE VISIT (OUTPATIENT)
Dept: NEUROLOGY | Facility: CLINIC | Age: 86
End: 2021-04-21
Payer: MEDICARE

## 2021-04-21 VITALS
HEIGHT: 60 IN | RESPIRATION RATE: 17 BRPM | HEART RATE: 51 BPM | BODY MASS INDEX: 35.93 KG/M2 | DIASTOLIC BLOOD PRESSURE: 61 MMHG | TEMPERATURE: 97 F | WEIGHT: 183 LBS | SYSTOLIC BLOOD PRESSURE: 137 MMHG

## 2021-04-21 DIAGNOSIS — R20.8 BURNING SENSATION OF LOWER EXTREMITY: ICD-10-CM

## 2021-04-21 DIAGNOSIS — G62.9 NEUROPATHY: Primary | ICD-10-CM

## 2021-04-21 PROCEDURE — 1125F AMNT PAIN NOTED PAIN PRSNT: CPT | Mod: S$GLB,,, | Performed by: NURSE PRACTITIONER

## 2021-04-21 PROCEDURE — 1159F PR MEDICATION LIST DOCUMENTED IN MEDICAL RECORD: ICD-10-PCS | Mod: S$GLB,,, | Performed by: NURSE PRACTITIONER

## 2021-04-21 PROCEDURE — 3288F PR FALLS RISK ASSESSMENT DOCUMENTED: ICD-10-PCS | Mod: CPTII,S$GLB,, | Performed by: NURSE PRACTITIONER

## 2021-04-21 PROCEDURE — 99999 PR PBB SHADOW E&M-EST. PATIENT-LVL IV: ICD-10-PCS | Mod: PBBFAC,,, | Performed by: NURSE PRACTITIONER

## 2021-04-21 PROCEDURE — 99205 PR OFFICE/OUTPT VISIT, NEW, LEVL V, 60-74 MIN: ICD-10-PCS | Mod: S$GLB,,, | Performed by: NURSE PRACTITIONER

## 2021-04-21 PROCEDURE — 3288F FALL RISK ASSESSMENT DOCD: CPT | Mod: CPTII,S$GLB,, | Performed by: NURSE PRACTITIONER

## 2021-04-21 PROCEDURE — 99999 PR PBB SHADOW E&M-EST. PATIENT-LVL IV: CPT | Mod: PBBFAC,,, | Performed by: NURSE PRACTITIONER

## 2021-04-21 PROCEDURE — 99205 OFFICE O/P NEW HI 60 MIN: CPT | Mod: S$GLB,,, | Performed by: NURSE PRACTITIONER

## 2021-04-21 PROCEDURE — 1101F PR PT FALLS ASSESS DOC 0-1 FALLS W/OUT INJ PAST YR: ICD-10-PCS | Mod: CPTII,S$GLB,, | Performed by: NURSE PRACTITIONER

## 2021-04-21 PROCEDURE — 1159F MED LIST DOCD IN RCRD: CPT | Mod: S$GLB,,, | Performed by: NURSE PRACTITIONER

## 2021-04-21 PROCEDURE — 1101F PT FALLS ASSESS-DOCD LE1/YR: CPT | Mod: CPTII,S$GLB,, | Performed by: NURSE PRACTITIONER

## 2021-04-21 PROCEDURE — 1125F PR PAIN SEVERITY QUANTIFIED, PAIN PRESENT: ICD-10-PCS | Mod: S$GLB,,, | Performed by: NURSE PRACTITIONER

## 2021-04-21 RX ORDER — GABAPENTIN 100 MG/1
100 CAPSULE ORAL 3 TIMES DAILY
Qty: 90 CAPSULE | Refills: 11 | Status: SHIPPED | OUTPATIENT
Start: 2021-04-21 | End: 2021-09-02

## 2021-04-22 ENCOUNTER — LAB VISIT (OUTPATIENT)
Dept: LAB | Facility: HOSPITAL | Age: 86
End: 2021-04-22
Attending: NURSE PRACTITIONER
Payer: MEDICARE

## 2021-04-22 DIAGNOSIS — R20.8 BURNING SENSATION OF LOWER EXTREMITY: ICD-10-CM

## 2021-04-22 DIAGNOSIS — G62.9 NEUROPATHY: ICD-10-CM

## 2021-04-22 LAB
BASOPHILS # BLD AUTO: 0.09 K/UL (ref 0–0.2)
BASOPHILS NFR BLD: 1.4 % (ref 0–1.9)
DIFFERENTIAL METHOD: ABNORMAL
EOSINOPHIL # BLD AUTO: 0.5 K/UL (ref 0–0.5)
EOSINOPHIL NFR BLD: 6.9 % (ref 0–8)
ERYTHROCYTE [DISTWIDTH] IN BLOOD BY AUTOMATED COUNT: 12.6 % (ref 11.5–14.5)
HCT VFR BLD AUTO: 41.6 % (ref 37–48.5)
HGB BLD-MCNC: 13.7 G/DL (ref 12–16)
IMM GRANULOCYTES # BLD AUTO: 0.01 K/UL (ref 0–0.04)
IMM GRANULOCYTES NFR BLD AUTO: 0.2 % (ref 0–0.5)
LYMPHOCYTES # BLD AUTO: 1.9 K/UL (ref 1–4.8)
LYMPHOCYTES NFR BLD: 28.1 % (ref 18–48)
MCH RBC QN AUTO: 31.5 PG (ref 27–31)
MCHC RBC AUTO-ENTMCNC: 32.9 G/DL (ref 32–36)
MCV RBC AUTO: 96 FL (ref 82–98)
MONOCYTES # BLD AUTO: 0.4 K/UL (ref 0.3–1)
MONOCYTES NFR BLD: 5.4 % (ref 4–15)
NEUTROPHILS # BLD AUTO: 3.8 K/UL (ref 1.8–7.7)
NEUTROPHILS NFR BLD: 58 % (ref 38–73)
NRBC BLD-RTO: 0 /100 WBC
PLATELET # BLD AUTO: 328 K/UL (ref 150–450)
PMV BLD AUTO: 9.9 FL (ref 9.2–12.9)
RBC # BLD AUTO: 4.35 M/UL (ref 4–5.4)
RHEUMATOID FACT SERPL-ACNC: 13 IU/ML (ref 0–15)
WBC # BLD AUTO: 6.62 K/UL (ref 3.9–12.7)

## 2021-04-22 PROCEDURE — 86431 RHEUMATOID FACTOR QUANT: CPT | Performed by: NURSE PRACTITIONER

## 2021-04-22 PROCEDURE — 84165 PATHOLOGIST INTERPRETATION SPE: ICD-10-PCS | Mod: 26,,, | Performed by: PATHOLOGY

## 2021-04-22 PROCEDURE — 86334 IMMUNOFIX E-PHORESIS SERUM: CPT | Performed by: NURSE PRACTITIONER

## 2021-04-22 PROCEDURE — 86235 NUCLEAR ANTIGEN ANTIBODY: CPT | Mod: 59 | Performed by: NURSE PRACTITIONER

## 2021-04-22 PROCEDURE — 86334 PATHOLOGIST INTERPRETATION IFE: ICD-10-PCS | Mod: 26,,, | Performed by: PATHOLOGY

## 2021-04-22 PROCEDURE — 86235 NUCLEAR ANTIGEN ANTIBODY: CPT | Performed by: NURSE PRACTITIONER

## 2021-04-22 PROCEDURE — 85025 COMPLETE CBC W/AUTO DIFF WBC: CPT | Performed by: NURSE PRACTITIONER

## 2021-04-22 PROCEDURE — 84207 ASSAY OF VITAMIN B-6: CPT | Performed by: NURSE PRACTITIONER

## 2021-04-22 PROCEDURE — 84165 PROTEIN E-PHORESIS SERUM: CPT | Mod: 26,,, | Performed by: PATHOLOGY

## 2021-04-22 PROCEDURE — 84425 ASSAY OF VITAMIN B-1: CPT | Performed by: NURSE PRACTITIONER

## 2021-04-22 PROCEDURE — 86334 IMMUNOFIX E-PHORESIS SERUM: CPT | Mod: 26,,, | Performed by: PATHOLOGY

## 2021-04-22 PROCEDURE — 82607 VITAMIN B-12: CPT | Performed by: NURSE PRACTITIONER

## 2021-04-22 PROCEDURE — 84165 PROTEIN E-PHORESIS SERUM: CPT | Performed by: NURSE PRACTITIONER

## 2021-04-22 PROCEDURE — 86038 ANTINUCLEAR ANTIBODIES: CPT | Performed by: NURSE PRACTITIONER

## 2021-04-22 PROCEDURE — 84446 ASSAY OF VITAMIN E: CPT | Performed by: NURSE PRACTITIONER

## 2021-04-22 PROCEDURE — 36415 COLL VENOUS BLD VENIPUNCTURE: CPT | Performed by: NURSE PRACTITIONER

## 2021-04-23 LAB
ALBUMIN SERPL ELPH-MCNC: 4.19 G/DL (ref 3.35–5.55)
ALPHA1 GLOB SERPL ELPH-MCNC: 0.21 G/DL (ref 0.17–0.41)
ALPHA2 GLOB SERPL ELPH-MCNC: 0.71 G/DL (ref 0.43–0.99)
ANA SER QL IF: NORMAL
ANTI-SSA ANTIBODY: 0.04 RATIO (ref 0–0.99)
ANTI-SSA INTERPRETATION: NEGATIVE
ANTI-SSB ANTIBODY: 0.05 RATIO (ref 0–0.99)
ANTI-SSB INTERPRETATION: NEGATIVE
B-GLOBULIN SERPL ELPH-MCNC: 0.66 G/DL (ref 0.5–1.1)
GAMMA GLOB SERPL ELPH-MCNC: 0.93 G/DL (ref 0.67–1.58)
INTERPRETATION SERPL IFE-IMP: NORMAL
PATH REV BLD -IMP: NORMAL
PROT SERPL-MCNC: 6.7 G/DL (ref 6–8.4)
VIT B12 SERPL-MCNC: 468 PG/ML (ref 210–950)

## 2021-04-24 LAB
PATHOLOGIST INTERPRETATION IFE: NORMAL
PATHOLOGIST INTERPRETATION SPE: NORMAL

## 2021-04-27 LAB
A-TOCOPHEROL VIT E SERPL-MCNC: 1736 UG/DL (ref 500–1800)
PYRIDOXAL SERPL-MCNC: 12 UG/L (ref 5–50)
VIT B1 BLD-MCNC: 52 UG/L (ref 38–122)

## 2021-05-13 ENCOUNTER — OFFICE VISIT (OUTPATIENT)
Dept: FAMILY MEDICINE | Facility: CLINIC | Age: 86
End: 2021-05-13
Payer: MEDICARE

## 2021-05-13 VITALS
HEIGHT: 60 IN | BODY MASS INDEX: 35.93 KG/M2 | DIASTOLIC BLOOD PRESSURE: 58 MMHG | HEART RATE: 59 BPM | WEIGHT: 183 LBS | SYSTOLIC BLOOD PRESSURE: 127 MMHG | RESPIRATION RATE: 18 BRPM | OXYGEN SATURATION: 96 %

## 2021-05-13 DIAGNOSIS — R42 DIZZINESS: ICD-10-CM

## 2021-05-13 DIAGNOSIS — E55.9 VITAMIN D DEFICIENCY: ICD-10-CM

## 2021-05-13 DIAGNOSIS — Z79.899 ENCOUNTER FOR LONG-TERM CURRENT USE OF MEDICATION: ICD-10-CM

## 2021-05-13 DIAGNOSIS — Z76.0 MEDICATION REFILL: Primary | ICD-10-CM

## 2021-05-13 PROCEDURE — 99214 OFFICE O/P EST MOD 30 MIN: CPT | Mod: 25,S$GLB,, | Performed by: FAMILY MEDICINE

## 2021-05-13 PROCEDURE — 1101F PR PT FALLS ASSESS DOC 0-1 FALLS W/OUT INJ PAST YR: ICD-10-PCS | Mod: CPTII,S$GLB,, | Performed by: FAMILY MEDICINE

## 2021-05-13 PROCEDURE — 96372 THER/PROPH/DIAG INJ SC/IM: CPT | Mod: S$GLB,,, | Performed by: FAMILY MEDICINE

## 2021-05-13 PROCEDURE — 3288F FALL RISK ASSESSMENT DOCD: CPT | Mod: CPTII,S$GLB,, | Performed by: FAMILY MEDICINE

## 2021-05-13 PROCEDURE — 3288F PR FALLS RISK ASSESSMENT DOCUMENTED: ICD-10-PCS | Mod: CPTII,S$GLB,, | Performed by: FAMILY MEDICINE

## 2021-05-13 PROCEDURE — 1126F PR PAIN SEVERITY QUANTIFIED, NO PAIN PRESENT: ICD-10-PCS | Mod: S$GLB,,, | Performed by: FAMILY MEDICINE

## 2021-05-13 PROCEDURE — 1159F PR MEDICATION LIST DOCUMENTED IN MEDICAL RECORD: ICD-10-PCS | Mod: S$GLB,,, | Performed by: FAMILY MEDICINE

## 2021-05-13 PROCEDURE — 99999 PR PBB SHADOW E&M-EST. PATIENT-LVL III: CPT | Mod: PBBFAC,,, | Performed by: FAMILY MEDICINE

## 2021-05-13 PROCEDURE — 1101F PT FALLS ASSESS-DOCD LE1/YR: CPT | Mod: CPTII,S$GLB,, | Performed by: FAMILY MEDICINE

## 2021-05-13 PROCEDURE — 1126F AMNT PAIN NOTED NONE PRSNT: CPT | Mod: S$GLB,,, | Performed by: FAMILY MEDICINE

## 2021-05-13 PROCEDURE — 96372 PR INJECTION,THERAP/PROPH/DIAG2ST, IM OR SUBCUT: ICD-10-PCS | Mod: S$GLB,,, | Performed by: FAMILY MEDICINE

## 2021-05-13 PROCEDURE — 99999 PR PBB SHADOW E&M-EST. PATIENT-LVL III: ICD-10-PCS | Mod: PBBFAC,,, | Performed by: FAMILY MEDICINE

## 2021-05-13 PROCEDURE — 99214 PR OFFICE/OUTPT VISIT, EST, LEVL IV, 30-39 MIN: ICD-10-PCS | Mod: 25,S$GLB,, | Performed by: FAMILY MEDICINE

## 2021-05-13 PROCEDURE — 1159F MED LIST DOCD IN RCRD: CPT | Mod: S$GLB,,, | Performed by: FAMILY MEDICINE

## 2021-05-13 RX ORDER — NITROGLYCERIN 20 MG/1
1 PATCH TRANSDERMAL DAILY
Qty: 30 PATCH | Refills: 0 | Status: SHIPPED | OUTPATIENT
Start: 2021-05-13 | End: 2023-06-29

## 2021-05-13 RX ORDER — DEXAMETHASONE SODIUM PHOSPHATE 4 MG/ML
2 INJECTION, SOLUTION INTRA-ARTICULAR; INTRALESIONAL; INTRAMUSCULAR; INTRAVENOUS; SOFT TISSUE
Status: COMPLETED | OUTPATIENT
Start: 2021-05-13 | End: 2021-05-13

## 2021-05-13 RX ORDER — ERGOCALCIFEROL 1.25 MG/1
50000 CAPSULE ORAL
Qty: 6 CAPSULE | Refills: 3 | Status: SHIPPED | OUTPATIENT
Start: 2021-05-14 | End: 2021-05-25 | Stop reason: SDUPTHER

## 2021-05-13 RX ORDER — AZITHROMYCIN 250 MG/1
TABLET, FILM COATED ORAL
Qty: 6 TABLET | Refills: 0 | Status: SHIPPED | OUTPATIENT
Start: 2021-05-13 | End: 2021-05-18

## 2021-05-13 RX ADMIN — DEXAMETHASONE SODIUM PHOSPHATE 2 MG: 4 INJECTION, SOLUTION INTRA-ARTICULAR; INTRALESIONAL; INTRAMUSCULAR; INTRAVENOUS; SOFT TISSUE at 11:05

## 2021-05-25 DIAGNOSIS — E55.9 VITAMIN D DEFICIENCY: ICD-10-CM

## 2021-05-25 RX ORDER — ERGOCALCIFEROL 1.25 MG/1
50000 CAPSULE ORAL
Qty: 24 CAPSULE | Refills: 3 | Status: SHIPPED | OUTPATIENT
Start: 2021-05-28 | End: 2021-09-02

## 2021-06-10 ENCOUNTER — LAB VISIT (OUTPATIENT)
Dept: LAB | Facility: HOSPITAL | Age: 86
End: 2021-06-10
Attending: FAMILY MEDICINE
Payer: MEDICARE

## 2021-06-10 DIAGNOSIS — Z79.899 ENCOUNTER FOR LONG-TERM CURRENT USE OF MEDICATION: ICD-10-CM

## 2021-06-10 DIAGNOSIS — Z76.0 MEDICATION REFILL: ICD-10-CM

## 2021-06-10 LAB
ALBUMIN SERPL BCP-MCNC: 3.9 G/DL (ref 3.5–5.2)
ALP SERPL-CCNC: 57 U/L (ref 55–135)
ALT SERPL W/O P-5'-P-CCNC: 11 U/L (ref 10–44)
ANION GAP SERPL CALC-SCNC: 10 MMOL/L (ref 8–16)
AST SERPL-CCNC: 15 U/L (ref 10–40)
BILIRUB SERPL-MCNC: 0.4 MG/DL (ref 0.1–1)
BUN SERPL-MCNC: 26 MG/DL (ref 8–23)
CALCIUM SERPL-MCNC: 10.4 MG/DL (ref 8.7–10.5)
CHLORIDE SERPL-SCNC: 108 MMOL/L (ref 95–110)
CO2 SERPL-SCNC: 20 MMOL/L (ref 23–29)
CREAT SERPL-MCNC: 0.9 MG/DL (ref 0.5–1.4)
EST. GFR  (AFRICAN AMERICAN): >60 ML/MIN/1.73 M^2
EST. GFR  (NON AFRICAN AMERICAN): 57.7 ML/MIN/1.73 M^2
GLUCOSE SERPL-MCNC: 112 MG/DL (ref 70–110)
POTASSIUM SERPL-SCNC: 4.6 MMOL/L (ref 3.5–5.1)
PROT SERPL-MCNC: 6.8 G/DL (ref 6–8.4)
SODIUM SERPL-SCNC: 138 MMOL/L (ref 136–145)
TSH SERPL DL<=0.005 MIU/L-ACNC: 1.75 UIU/ML (ref 0.4–4)

## 2021-06-10 PROCEDURE — 84443 ASSAY THYROID STIM HORMONE: CPT | Performed by: FAMILY MEDICINE

## 2021-06-10 PROCEDURE — 80053 COMPREHEN METABOLIC PANEL: CPT | Performed by: FAMILY MEDICINE

## 2021-06-10 PROCEDURE — 36415 COLL VENOUS BLD VENIPUNCTURE: CPT | Performed by: FAMILY MEDICINE

## 2021-06-17 ENCOUNTER — OFFICE VISIT (OUTPATIENT)
Dept: FAMILY MEDICINE | Facility: CLINIC | Age: 86
End: 2021-06-17
Payer: MEDICARE

## 2021-06-17 VITALS
DIASTOLIC BLOOD PRESSURE: 57 MMHG | OXYGEN SATURATION: 97 % | HEART RATE: 55 BPM | RESPIRATION RATE: 18 BRPM | HEIGHT: 60 IN | BODY MASS INDEX: 35.93 KG/M2 | WEIGHT: 183 LBS | SYSTOLIC BLOOD PRESSURE: 114 MMHG

## 2021-06-17 DIAGNOSIS — R42 DIZZINESS: Primary | ICD-10-CM

## 2021-06-17 DIAGNOSIS — H61.23 BILATERAL IMPACTED CERUMEN: ICD-10-CM

## 2021-06-17 PROCEDURE — 1125F PR PAIN SEVERITY QUANTIFIED, PAIN PRESENT: ICD-10-PCS | Mod: S$GLB,,, | Performed by: FAMILY MEDICINE

## 2021-06-17 PROCEDURE — 3288F FALL RISK ASSESSMENT DOCD: CPT | Mod: CPTII,S$GLB,, | Performed by: FAMILY MEDICINE

## 2021-06-17 PROCEDURE — 1101F PT FALLS ASSESS-DOCD LE1/YR: CPT | Mod: CPTII,S$GLB,, | Performed by: FAMILY MEDICINE

## 2021-06-17 PROCEDURE — 99213 PR OFFICE/OUTPT VISIT, EST, LEVL III, 20-29 MIN: ICD-10-PCS | Mod: S$GLB,,, | Performed by: FAMILY MEDICINE

## 2021-06-17 PROCEDURE — 1159F PR MEDICATION LIST DOCUMENTED IN MEDICAL RECORD: ICD-10-PCS | Mod: S$GLB,,, | Performed by: FAMILY MEDICINE

## 2021-06-17 PROCEDURE — 99999 PR PBB SHADOW E&M-EST. PATIENT-LVL IV: ICD-10-PCS | Mod: PBBFAC,,, | Performed by: FAMILY MEDICINE

## 2021-06-17 PROCEDURE — 1159F MED LIST DOCD IN RCRD: CPT | Mod: S$GLB,,, | Performed by: FAMILY MEDICINE

## 2021-06-17 PROCEDURE — 1125F AMNT PAIN NOTED PAIN PRSNT: CPT | Mod: S$GLB,,, | Performed by: FAMILY MEDICINE

## 2021-06-17 PROCEDURE — 3288F PR FALLS RISK ASSESSMENT DOCUMENTED: ICD-10-PCS | Mod: CPTII,S$GLB,, | Performed by: FAMILY MEDICINE

## 2021-06-17 PROCEDURE — 1101F PR PT FALLS ASSESS DOC 0-1 FALLS W/OUT INJ PAST YR: ICD-10-PCS | Mod: CPTII,S$GLB,, | Performed by: FAMILY MEDICINE

## 2021-06-17 PROCEDURE — 99213 OFFICE O/P EST LOW 20 MIN: CPT | Mod: S$GLB,,, | Performed by: FAMILY MEDICINE

## 2021-06-17 PROCEDURE — 99999 PR PBB SHADOW E&M-EST. PATIENT-LVL IV: CPT | Mod: PBBFAC,,, | Performed by: FAMILY MEDICINE

## 2021-08-12 ENCOUNTER — TELEPHONE (OUTPATIENT)
Dept: FAMILY MEDICINE | Facility: CLINIC | Age: 86
End: 2021-08-12

## 2021-08-12 DIAGNOSIS — H92.09 OTALGIA, UNSPECIFIED LATERALITY: Primary | ICD-10-CM

## 2021-08-12 DIAGNOSIS — R42 DIZZINESS: ICD-10-CM

## 2021-08-14 ENCOUNTER — NURSE TRIAGE (OUTPATIENT)
Dept: ADMINISTRATIVE | Facility: CLINIC | Age: 86
End: 2021-08-14

## 2021-08-15 ENCOUNTER — TELEPHONE (OUTPATIENT)
Dept: FAMILY MEDICINE | Facility: CLINIC | Age: 86
End: 2021-08-15

## 2021-08-16 ENCOUNTER — TELEPHONE (OUTPATIENT)
Dept: FAMILY MEDICINE | Facility: CLINIC | Age: 86
End: 2021-08-16

## 2021-08-16 ENCOUNTER — CLINICAL SUPPORT (OUTPATIENT)
Dept: FAMILY MEDICINE | Facility: CLINIC | Age: 86
End: 2021-08-16
Payer: MEDICARE

## 2021-08-16 DIAGNOSIS — Z20.822 ENCOUNTER FOR LABORATORY TESTING FOR COVID-19 VIRUS: ICD-10-CM

## 2021-08-16 PROCEDURE — U0003 INFECTIOUS AGENT DETECTION BY NUCLEIC ACID (DNA OR RNA); SEVERE ACUTE RESPIRATORY SYNDROME CORONAVIRUS 2 (SARS-COV-2) (CORONAVIRUS DISEASE [COVID-19]), AMPLIFIED PROBE TECHNIQUE, MAKING USE OF HIGH THROUGHPUT TECHNOLOGIES AS DESCRIBED BY CMS-2020-01-R: HCPCS | Performed by: FAMILY MEDICINE

## 2021-08-16 PROCEDURE — U0005 INFEC AGEN DETEC AMPLI PROBE: HCPCS | Performed by: FAMILY MEDICINE

## 2021-08-17 LAB
SARS-COV-2 RNA RESP QL NAA+PROBE: NOT DETECTED
SARS-COV-2- CYCLE NUMBER: -1

## 2021-08-26 ENCOUNTER — TELEPHONE (OUTPATIENT)
Dept: UROLOGY | Facility: CLINIC | Age: 86
End: 2021-08-26

## 2021-09-02 ENCOUNTER — OFFICE VISIT (OUTPATIENT)
Dept: FAMILY MEDICINE | Facility: CLINIC | Age: 86
End: 2021-09-02
Payer: MEDICARE

## 2021-09-02 VITALS
HEART RATE: 59 BPM | DIASTOLIC BLOOD PRESSURE: 69 MMHG | RESPIRATION RATE: 16 BRPM | SYSTOLIC BLOOD PRESSURE: 150 MMHG | WEIGHT: 193 LBS | OXYGEN SATURATION: 96 % | HEIGHT: 60 IN | BODY MASS INDEX: 37.89 KG/M2

## 2021-09-02 DIAGNOSIS — K11.5: Primary | ICD-10-CM

## 2021-09-02 DIAGNOSIS — R22.32 MASS OF LEFT AXILLA: ICD-10-CM

## 2021-09-02 DIAGNOSIS — M10.9 GOUT INVOLVING TOE OF RIGHT FOOT, UNSPECIFIED CAUSE, UNSPECIFIED CHRONICITY: ICD-10-CM

## 2021-09-02 PROCEDURE — 99999 PR PBB SHADOW E&M-EST. PATIENT-LVL IV: CPT | Mod: PBBFAC,,, | Performed by: FAMILY MEDICINE

## 2021-09-02 PROCEDURE — 1160F PR REVIEW ALL MEDS BY PRESCRIBER/CLIN PHARMACIST DOCUMENTED: ICD-10-PCS | Mod: CPTII,S$GLB,, | Performed by: FAMILY MEDICINE

## 2021-09-02 PROCEDURE — 1125F PR PAIN SEVERITY QUANTIFIED, PAIN PRESENT: ICD-10-PCS | Mod: CPTII,S$GLB,, | Performed by: FAMILY MEDICINE

## 2021-09-02 PROCEDURE — 1159F PR MEDICATION LIST DOCUMENTED IN MEDICAL RECORD: ICD-10-PCS | Mod: CPTII,S$GLB,, | Performed by: FAMILY MEDICINE

## 2021-09-02 PROCEDURE — 3288F PR FALLS RISK ASSESSMENT DOCUMENTED: ICD-10-PCS | Mod: CPTII,S$GLB,, | Performed by: FAMILY MEDICINE

## 2021-09-02 PROCEDURE — 1125F AMNT PAIN NOTED PAIN PRSNT: CPT | Mod: CPTII,S$GLB,, | Performed by: FAMILY MEDICINE

## 2021-09-02 PROCEDURE — 3288F FALL RISK ASSESSMENT DOCD: CPT | Mod: CPTII,S$GLB,, | Performed by: FAMILY MEDICINE

## 2021-09-02 PROCEDURE — 99999 PR PBB SHADOW E&M-EST. PATIENT-LVL IV: ICD-10-PCS | Mod: PBBFAC,,, | Performed by: FAMILY MEDICINE

## 2021-09-02 PROCEDURE — 1101F PT FALLS ASSESS-DOCD LE1/YR: CPT | Mod: CPTII,S$GLB,, | Performed by: FAMILY MEDICINE

## 2021-09-02 PROCEDURE — 1101F PR PT FALLS ASSESS DOC 0-1 FALLS W/OUT INJ PAST YR: ICD-10-PCS | Mod: CPTII,S$GLB,, | Performed by: FAMILY MEDICINE

## 2021-09-02 PROCEDURE — 99214 PR OFFICE/OUTPT VISIT, EST, LEVL IV, 30-39 MIN: ICD-10-PCS | Mod: S$GLB,,, | Performed by: FAMILY MEDICINE

## 2021-09-02 PROCEDURE — 1160F RVW MEDS BY RX/DR IN RCRD: CPT | Mod: CPTII,S$GLB,, | Performed by: FAMILY MEDICINE

## 2021-09-02 PROCEDURE — 1159F MED LIST DOCD IN RCRD: CPT | Mod: CPTII,S$GLB,, | Performed by: FAMILY MEDICINE

## 2021-09-02 PROCEDURE — 99214 OFFICE O/P EST MOD 30 MIN: CPT | Mod: S$GLB,,, | Performed by: FAMILY MEDICINE

## 2021-09-02 RX ORDER — ALLOPURINOL 100 MG/1
100 TABLET ORAL DAILY
Qty: 90 TABLET | Refills: 1 | Status: SHIPPED | OUTPATIENT
Start: 2021-09-02 | End: 2022-01-13

## 2021-09-15 ENCOUNTER — IMMUNIZATION (OUTPATIENT)
Dept: FAMILY MEDICINE | Facility: CLINIC | Age: 86
End: 2021-09-15
Payer: MEDICARE

## 2021-09-15 ENCOUNTER — HOSPITAL ENCOUNTER (OUTPATIENT)
Dept: RADIOLOGY | Facility: HOSPITAL | Age: 86
Discharge: HOME | End: 2021-09-15
Attending: FAMILY MEDICINE
Payer: MEDICARE

## 2021-09-15 DIAGNOSIS — Z23 NEED FOR VACCINATION: Primary | ICD-10-CM

## 2021-09-15 DIAGNOSIS — R22.32 MASS OF LEFT AXILLA: ICD-10-CM

## 2021-09-15 PROCEDURE — 76882 US AXILLA ONLY (BREAST IMAGING) LEFT: ICD-10-PCS | Mod: 26,LT,, | Performed by: RADIOLOGY

## 2021-09-15 PROCEDURE — 76882 US LMTD JT/FCL EVL NVASC XTR: CPT | Mod: 26,LT,, | Performed by: RADIOLOGY

## 2021-09-15 PROCEDURE — 0013A COVID-19, MRNA, LNP-S, PF, 100 MCG/0.5 ML DOSE VACCINE: ICD-10-PCS | Mod: CV19,,, | Performed by: FAMILY MEDICINE

## 2021-09-15 PROCEDURE — 91301 COVID-19, MRNA, LNP-S, PF, 100 MCG/0.5 ML DOSE VACCINE: CPT | Mod: ,,, | Performed by: FAMILY MEDICINE

## 2021-09-15 PROCEDURE — 76882 US LMTD JT/FCL EVL NVASC XTR: CPT | Mod: TC,LT

## 2021-09-15 PROCEDURE — 0013A COVID-19, MRNA, LNP-S, PF, 100 MCG/0.5 ML DOSE VACCINE: CPT | Mod: CV19,,, | Performed by: FAMILY MEDICINE

## 2021-09-15 PROCEDURE — 91301 COVID-19, MRNA, LNP-S, PF, 100 MCG/0.5 ML DOSE VACCINE: ICD-10-PCS | Mod: ,,, | Performed by: FAMILY MEDICINE

## 2021-09-16 ENCOUNTER — TELEPHONE (OUTPATIENT)
Dept: FAMILY MEDICINE | Facility: CLINIC | Age: 86
End: 2021-09-16

## 2021-09-17 ENCOUNTER — TELEPHONE (OUTPATIENT)
Dept: FAMILY MEDICINE | Facility: CLINIC | Age: 86
End: 2021-09-17

## 2021-09-21 ENCOUNTER — PATIENT OUTREACH (OUTPATIENT)
Dept: ADMINISTRATIVE | Facility: OTHER | Age: 86
End: 2021-09-21

## 2021-09-23 ENCOUNTER — OFFICE VISIT (OUTPATIENT)
Dept: UROLOGY | Facility: CLINIC | Age: 86
End: 2021-09-23
Payer: MEDICARE

## 2021-09-23 VITALS
SYSTOLIC BLOOD PRESSURE: 150 MMHG | BODY MASS INDEX: 37.87 KG/M2 | WEIGHT: 192.88 LBS | HEART RATE: 57 BPM | HEIGHT: 60 IN | DIASTOLIC BLOOD PRESSURE: 68 MMHG

## 2021-09-23 DIAGNOSIS — N20.0 NEPHROLITHIASIS: ICD-10-CM

## 2021-09-23 DIAGNOSIS — R32 URINARY INCONTINENCE, UNSPECIFIED TYPE: Primary | ICD-10-CM

## 2021-09-23 LAB
BACTERIA #/AREA URNS HPF: ABNORMAL /HPF
BILIRUB UR QL STRIP: NEGATIVE
CLARITY UR: CLEAR
COLOR UR: YELLOW
GLUCOSE UR QL STRIP: NEGATIVE
HGB UR QL STRIP: NEGATIVE
KETONES UR QL STRIP: NEGATIVE
LEUKOCYTE ESTERASE UR QL STRIP: ABNORMAL
MICROSCOPIC COMMENT: ABNORMAL
NITRITE UR QL STRIP: NEGATIVE
PH UR STRIP: 6 [PH] (ref 5–8)
POC RESIDUAL URINE VOLUME: 53 ML (ref 0–100)
PROT UR QL STRIP: NEGATIVE
SP GR UR STRIP: 1.02 (ref 1–1.03)
URN SPEC COLLECT METH UR: ABNORMAL
UROBILINOGEN UR STRIP-ACNC: NEGATIVE EU/DL
WBC #/AREA URNS HPF: 16 /HPF (ref 0–5)

## 2021-09-23 PROCEDURE — 51798 US URINE CAPACITY MEASURE: CPT | Mod: S$GLB,,, | Performed by: UROLOGY

## 2021-09-23 PROCEDURE — 99215 OFFICE O/P EST HI 40 MIN: CPT | Mod: 25,S$GLB,, | Performed by: UROLOGY

## 2021-09-23 PROCEDURE — 1159F PR MEDICATION LIST DOCUMENTED IN MEDICAL RECORD: ICD-10-PCS | Mod: CPTII,S$GLB,, | Performed by: UROLOGY

## 2021-09-23 PROCEDURE — 87186 SC STD MICRODIL/AGAR DIL: CPT | Performed by: UROLOGY

## 2021-09-23 PROCEDURE — 1159F MED LIST DOCD IN RCRD: CPT | Mod: CPTII,S$GLB,, | Performed by: UROLOGY

## 2021-09-23 PROCEDURE — 51725 SIMPLE CYSTOMETROGRAM: CPT | Mod: S$GLB,,, | Performed by: UROLOGY

## 2021-09-23 PROCEDURE — 81000 URINALYSIS NONAUTO W/SCOPE: CPT | Performed by: UROLOGY

## 2021-09-23 PROCEDURE — 51798 POCT BLADDER SCAN: ICD-10-PCS | Mod: S$GLB,,, | Performed by: UROLOGY

## 2021-09-23 PROCEDURE — 99999 PR PBB SHADOW E&M-EST. PATIENT-LVL III: ICD-10-PCS | Mod: PBBFAC,,, | Performed by: UROLOGY

## 2021-09-23 PROCEDURE — 51725 PR SIMPLE CYSTOMETROGRAM: ICD-10-PCS | Mod: S$GLB,,, | Performed by: UROLOGY

## 2021-09-23 PROCEDURE — 87088 URINE BACTERIA CULTURE: CPT | Performed by: UROLOGY

## 2021-09-23 PROCEDURE — 87077 CULTURE AEROBIC IDENTIFY: CPT | Performed by: UROLOGY

## 2021-09-23 PROCEDURE — 99215 PR OFFICE/OUTPT VISIT, EST, LEVL V, 40-54 MIN: ICD-10-PCS | Mod: 25,S$GLB,, | Performed by: UROLOGY

## 2021-09-23 PROCEDURE — 87086 URINE CULTURE/COLONY COUNT: CPT | Performed by: UROLOGY

## 2021-09-23 PROCEDURE — 1160F RVW MEDS BY RX/DR IN RCRD: CPT | Mod: CPTII,S$GLB,, | Performed by: UROLOGY

## 2021-09-23 PROCEDURE — 1160F PR REVIEW ALL MEDS BY PRESCRIBER/CLIN PHARMACIST DOCUMENTED: ICD-10-PCS | Mod: CPTII,S$GLB,, | Performed by: UROLOGY

## 2021-09-23 PROCEDURE — 99999 PR PBB SHADOW E&M-EST. PATIENT-LVL III: CPT | Mod: PBBFAC,,, | Performed by: UROLOGY

## 2021-09-26 LAB — BACTERIA UR CULT: ABNORMAL

## 2021-10-06 RX ORDER — NITROFURANTOIN 25; 75 MG/1; MG/1
100 CAPSULE ORAL 2 TIMES DAILY
Qty: 14 CAPSULE | Refills: 0 | Status: SHIPPED | OUTPATIENT
Start: 2021-10-06 | End: 2021-10-07

## 2021-10-07 ENCOUNTER — OFFICE VISIT (OUTPATIENT)
Dept: CARDIOLOGY | Facility: CLINIC | Age: 86
End: 2021-10-07
Payer: MEDICARE

## 2021-10-07 VITALS
HEIGHT: 60 IN | RESPIRATION RATE: 18 BRPM | BODY MASS INDEX: 36.52 KG/M2 | OXYGEN SATURATION: 96 % | DIASTOLIC BLOOD PRESSURE: 75 MMHG | HEART RATE: 61 BPM | WEIGHT: 186 LBS | SYSTOLIC BLOOD PRESSURE: 132 MMHG

## 2021-10-07 DIAGNOSIS — Z95.5 STATUS POST CORONARY ARTERY STENT PLACEMENT: Primary | ICD-10-CM

## 2021-10-07 DIAGNOSIS — F09 COGNITIVE DYSFUNCTION: ICD-10-CM

## 2021-10-07 DIAGNOSIS — G47.19 EXCESSIVE DAYTIME SLEEPINESS: ICD-10-CM

## 2021-10-07 DIAGNOSIS — Z53.20 STATIN DECLINED: ICD-10-CM

## 2021-10-07 DIAGNOSIS — E66.01 CLASS 2 SEVERE OBESITY DUE TO EXCESS CALORIES WITH SERIOUS COMORBIDITY AND BODY MASS INDEX (BMI) OF 36.0 TO 36.9 IN ADULT: ICD-10-CM

## 2021-10-07 DIAGNOSIS — Z91.199 PERSONAL HISTORY OF NONCOMPLIANCE WITH MEDICAL TREATMENT, PRESENTING HAZARDS TO HEALTH: ICD-10-CM

## 2021-10-07 DIAGNOSIS — Z98.890 HISTORY OF LEFT-SIDED CAROTID ENDARTERECTOMY: ICD-10-CM

## 2021-10-07 DIAGNOSIS — G89.4 CHRONIC PAIN SYNDROME: ICD-10-CM

## 2021-10-07 DIAGNOSIS — R52 GENERALIZED BODY ACHES: ICD-10-CM

## 2021-10-07 DIAGNOSIS — E78.2 MIXED HYPERLIPIDEMIA: ICD-10-CM

## 2021-10-07 DIAGNOSIS — R79.89 PRERENAL AZOTEMIA: ICD-10-CM

## 2021-10-07 DIAGNOSIS — N18.31 STAGE 3A CHRONIC KIDNEY DISEASE: ICD-10-CM

## 2021-10-07 DIAGNOSIS — I11.9 HYPERTENSIVE HEART DISEASE WITHOUT HEART FAILURE: ICD-10-CM

## 2021-10-07 DIAGNOSIS — R42 DIZZINESS: ICD-10-CM

## 2021-10-07 DIAGNOSIS — R41.3 MEMORY DIFFICULTIES: ICD-10-CM

## 2021-10-07 DIAGNOSIS — M79.606 PAIN OF LOWER EXTREMITY, UNSPECIFIED LATERALITY: ICD-10-CM

## 2021-10-07 PROBLEM — E66.812 CLASS 2 SEVERE OBESITY DUE TO EXCESS CALORIES WITH SERIOUS COMORBIDITY AND BODY MASS INDEX (BMI) OF 36.0 TO 36.9 IN ADULT: Status: ACTIVE | Noted: 2018-03-19

## 2021-10-07 PROCEDURE — 1159F PR MEDICATION LIST DOCUMENTED IN MEDICAL RECORD: ICD-10-PCS | Mod: CPTII,S$GLB,, | Performed by: INTERNAL MEDICINE

## 2021-10-07 PROCEDURE — 99999 PR PBB SHADOW E&M-EST. PATIENT-LVL IV: CPT | Mod: PBBFAC,,, | Performed by: INTERNAL MEDICINE

## 2021-10-07 PROCEDURE — 1126F AMNT PAIN NOTED NONE PRSNT: CPT | Mod: CPTII,S$GLB,, | Performed by: INTERNAL MEDICINE

## 2021-10-07 PROCEDURE — 1159F MED LIST DOCD IN RCRD: CPT | Mod: CPTII,S$GLB,, | Performed by: INTERNAL MEDICINE

## 2021-10-07 PROCEDURE — 3288F FALL RISK ASSESSMENT DOCD: CPT | Mod: CPTII,S$GLB,, | Performed by: INTERNAL MEDICINE

## 2021-10-07 PROCEDURE — 1101F PT FALLS ASSESS-DOCD LE1/YR: CPT | Mod: CPTII,S$GLB,, | Performed by: INTERNAL MEDICINE

## 2021-10-07 PROCEDURE — 99999 PR PBB SHADOW E&M-EST. PATIENT-LVL IV: ICD-10-PCS | Mod: PBBFAC,,, | Performed by: INTERNAL MEDICINE

## 2021-10-07 PROCEDURE — 99215 OFFICE O/P EST HI 40 MIN: CPT | Mod: S$GLB,,, | Performed by: INTERNAL MEDICINE

## 2021-10-07 PROCEDURE — 1101F PR PT FALLS ASSESS DOC 0-1 FALLS W/OUT INJ PAST YR: ICD-10-PCS | Mod: CPTII,S$GLB,, | Performed by: INTERNAL MEDICINE

## 2021-10-07 PROCEDURE — 99215 PR OFFICE/OUTPT VISIT, EST, LEVL V, 40-54 MIN: ICD-10-PCS | Mod: S$GLB,,, | Performed by: INTERNAL MEDICINE

## 2021-10-07 PROCEDURE — 1126F PR PAIN SEVERITY QUANTIFIED, NO PAIN PRESENT: ICD-10-PCS | Mod: CPTII,S$GLB,, | Performed by: INTERNAL MEDICINE

## 2021-10-07 PROCEDURE — 3288F PR FALLS RISK ASSESSMENT DOCUMENTED: ICD-10-PCS | Mod: CPTII,S$GLB,, | Performed by: INTERNAL MEDICINE

## 2021-10-07 RX ORDER — ERGOCALCIFEROL 1.25 MG/1
CAPSULE ORAL
COMMUNITY
Start: 2021-09-17 | End: 2021-10-21 | Stop reason: SDUPTHER

## 2021-10-20 ENCOUNTER — OFFICE VISIT (OUTPATIENT)
Dept: PHYSICAL MEDICINE AND REHAB | Facility: CLINIC | Age: 86
End: 2021-10-20
Payer: MEDICARE

## 2021-10-20 VITALS
WEIGHT: 186 LBS | DIASTOLIC BLOOD PRESSURE: 69 MMHG | BODY MASS INDEX: 36.52 KG/M2 | HEART RATE: 52 BPM | SYSTOLIC BLOOD PRESSURE: 163 MMHG | HEIGHT: 60 IN

## 2021-10-20 DIAGNOSIS — R60.9 EDEMA, UNSPECIFIED TYPE: ICD-10-CM

## 2021-10-20 DIAGNOSIS — M79.606 PAIN OF LOWER EXTREMITY, UNSPECIFIED LATERALITY: ICD-10-CM

## 2021-10-20 DIAGNOSIS — M54.16 LUMBAR RADICULITIS: Primary | ICD-10-CM

## 2021-10-20 DIAGNOSIS — G89.4 CHRONIC PAIN SYNDROME: ICD-10-CM

## 2021-10-20 DIAGNOSIS — R52 GENERALIZED BODY ACHES: ICD-10-CM

## 2021-10-20 PROCEDURE — 99999 PR PBB SHADOW E&M-EST. PATIENT-LVL IV: ICD-10-PCS | Mod: PBBFAC,,, | Performed by: PHYSICAL MEDICINE & REHABILITATION

## 2021-10-20 PROCEDURE — 1125F PR PAIN SEVERITY QUANTIFIED, PAIN PRESENT: ICD-10-PCS | Mod: CPTII,S$GLB,, | Performed by: PHYSICAL MEDICINE & REHABILITATION

## 2021-10-20 PROCEDURE — 1159F MED LIST DOCD IN RCRD: CPT | Mod: CPTII,S$GLB,, | Performed by: PHYSICAL MEDICINE & REHABILITATION

## 2021-10-20 PROCEDURE — 1101F PR PT FALLS ASSESS DOC 0-1 FALLS W/OUT INJ PAST YR: ICD-10-PCS | Mod: CPTII,S$GLB,, | Performed by: PHYSICAL MEDICINE & REHABILITATION

## 2021-10-20 PROCEDURE — 99999 PR PBB SHADOW E&M-EST. PATIENT-LVL IV: CPT | Mod: PBBFAC,,, | Performed by: PHYSICAL MEDICINE & REHABILITATION

## 2021-10-20 PROCEDURE — 3288F FALL RISK ASSESSMENT DOCD: CPT | Mod: CPTII,S$GLB,, | Performed by: PHYSICAL MEDICINE & REHABILITATION

## 2021-10-20 PROCEDURE — 3288F PR FALLS RISK ASSESSMENT DOCUMENTED: ICD-10-PCS | Mod: CPTII,S$GLB,, | Performed by: PHYSICAL MEDICINE & REHABILITATION

## 2021-10-20 PROCEDURE — 1101F PT FALLS ASSESS-DOCD LE1/YR: CPT | Mod: CPTII,S$GLB,, | Performed by: PHYSICAL MEDICINE & REHABILITATION

## 2021-10-20 PROCEDURE — 1159F PR MEDICATION LIST DOCUMENTED IN MEDICAL RECORD: ICD-10-PCS | Mod: CPTII,S$GLB,, | Performed by: PHYSICAL MEDICINE & REHABILITATION

## 2021-10-20 PROCEDURE — 1125F AMNT PAIN NOTED PAIN PRSNT: CPT | Mod: CPTII,S$GLB,, | Performed by: PHYSICAL MEDICINE & REHABILITATION

## 2021-10-20 PROCEDURE — 99204 PR OFFICE/OUTPT VISIT, NEW, LEVL IV, 45-59 MIN: ICD-10-PCS | Mod: S$GLB,,, | Performed by: PHYSICAL MEDICINE & REHABILITATION

## 2021-10-20 PROCEDURE — 99204 OFFICE O/P NEW MOD 45 MIN: CPT | Mod: S$GLB,,, | Performed by: PHYSICAL MEDICINE & REHABILITATION

## 2021-10-20 RX ORDER — LIDOCAINE HYDROCHLORIDE 10 MG/ML
1 INJECTION, SOLUTION EPIDURAL; INFILTRATION; INTRACAUDAL; PERINEURAL ONCE
Status: CANCELLED | OUTPATIENT
Start: 2021-10-20 | End: 2021-10-20

## 2021-10-21 ENCOUNTER — OFFICE VISIT (OUTPATIENT)
Dept: FAMILY MEDICINE | Facility: CLINIC | Age: 86
End: 2021-10-21
Payer: MEDICARE

## 2021-10-21 VITALS
HEART RATE: 59 BPM | DIASTOLIC BLOOD PRESSURE: 65 MMHG | BODY MASS INDEX: 36.71 KG/M2 | OXYGEN SATURATION: 97 % | HEIGHT: 60 IN | SYSTOLIC BLOOD PRESSURE: 135 MMHG | RESPIRATION RATE: 18 BRPM | WEIGHT: 187 LBS

## 2021-10-21 DIAGNOSIS — K11.9 SALIVARY GLAND DISORDER: ICD-10-CM

## 2021-10-21 DIAGNOSIS — G47.00 INSOMNIA, UNSPECIFIED TYPE: ICD-10-CM

## 2021-10-21 DIAGNOSIS — I25.118 CORONARY ARTERY DISEASE OF NATIVE ARTERY OF NATIVE HEART WITH STABLE ANGINA PECTORIS: ICD-10-CM

## 2021-10-21 DIAGNOSIS — E55.9 VITAMIN D DEFICIENCY: Primary | ICD-10-CM

## 2021-10-21 DIAGNOSIS — R54 FRAILTY: ICD-10-CM

## 2021-10-21 PROCEDURE — 99999 PR PBB SHADOW E&M-EST. PATIENT-LVL III: CPT | Mod: PBBFAC,,, | Performed by: FAMILY MEDICINE

## 2021-10-21 PROCEDURE — 99214 PR OFFICE/OUTPT VISIT, EST, LEVL IV, 30-39 MIN: ICD-10-PCS | Mod: S$GLB,,, | Performed by: FAMILY MEDICINE

## 2021-10-21 PROCEDURE — 99999 PR PBB SHADOW E&M-EST. PATIENT-LVL III: ICD-10-PCS | Mod: PBBFAC,,, | Performed by: FAMILY MEDICINE

## 2021-10-21 PROCEDURE — 1159F PR MEDICATION LIST DOCUMENTED IN MEDICAL RECORD: ICD-10-PCS | Mod: CPTII,S$GLB,, | Performed by: FAMILY MEDICINE

## 2021-10-21 PROCEDURE — 1159F MED LIST DOCD IN RCRD: CPT | Mod: CPTII,S$GLB,, | Performed by: FAMILY MEDICINE

## 2021-10-21 PROCEDURE — 99214 OFFICE O/P EST MOD 30 MIN: CPT | Mod: S$GLB,,, | Performed by: FAMILY MEDICINE

## 2021-10-21 PROCEDURE — 1126F AMNT PAIN NOTED NONE PRSNT: CPT | Mod: CPTII,S$GLB,, | Performed by: FAMILY MEDICINE

## 2021-10-21 PROCEDURE — 1126F PR PAIN SEVERITY QUANTIFIED, NO PAIN PRESENT: ICD-10-PCS | Mod: CPTII,S$GLB,, | Performed by: FAMILY MEDICINE

## 2021-10-21 RX ORDER — DIAZEPAM 5 MG/1
5 TABLET ORAL NIGHTLY PRN
Qty: 30 TABLET | Refills: 0 | Status: SHIPPED | OUTPATIENT
Start: 2021-10-21 | End: 2022-01-24

## 2021-10-21 RX ORDER — ERGOCALCIFEROL 1.25 MG/1
CAPSULE ORAL
Qty: 6 CAPSULE | Refills: 3 | Status: SHIPPED | OUTPATIENT
Start: 2021-10-21 | End: 2022-03-31 | Stop reason: SDUPTHER

## 2021-10-22 ENCOUNTER — HOSPITAL ENCOUNTER (OUTPATIENT)
Dept: RADIOLOGY | Facility: HOSPITAL | Age: 86
Discharge: HOME | End: 2021-10-22
Attending: OBSTETRICS & GYNECOLOGY
Payer: MEDICARE

## 2021-10-22 DIAGNOSIS — Z78.0 ENCOUNTER FOR OSTEOPOROSIS SCREENING IN ASYMPTOMATIC POSTMENOPAUSAL PATIENT: ICD-10-CM

## 2021-10-22 DIAGNOSIS — Z13.820 ENCOUNTER FOR OSTEOPOROSIS SCREENING IN ASYMPTOMATIC POSTMENOPAUSAL PATIENT: ICD-10-CM

## 2021-10-22 PROCEDURE — 77080 DXA BONE DENSITY AXIAL: CPT | Mod: TC

## 2021-10-22 PROCEDURE — 77080 DEXA BONE DENSITY SPINE HIP: ICD-10-PCS | Mod: 26,,, | Performed by: RADIOLOGY

## 2021-10-22 PROCEDURE — 77080 DXA BONE DENSITY AXIAL: CPT | Mod: 26,,, | Performed by: RADIOLOGY

## 2021-11-03 ENCOUNTER — HOSPITAL ENCOUNTER (OUTPATIENT)
Dept: RADIOLOGY | Facility: HOSPITAL | Age: 86
Discharge: HOME | End: 2021-11-03
Attending: PHYSICAL MEDICINE & REHABILITATION
Payer: MEDICARE

## 2021-11-03 DIAGNOSIS — R60.9 EDEMA, UNSPECIFIED TYPE: ICD-10-CM

## 2021-11-03 DIAGNOSIS — M79.606 PAIN OF LOWER EXTREMITY, UNSPECIFIED LATERALITY: ICD-10-CM

## 2021-11-03 PROCEDURE — 93970 US LOWER EXTREMITY VEINS BILATERAL: ICD-10-PCS | Mod: 26,,, | Performed by: RADIOLOGY

## 2021-11-03 PROCEDURE — 93925 US LOWER EXTREMITY ARTERIES BILATERAL: ICD-10-PCS | Mod: 26,,, | Performed by: RADIOLOGY

## 2021-11-03 PROCEDURE — 93925 LOWER EXTREMITY STUDY: CPT | Mod: TC,PN

## 2021-11-03 PROCEDURE — 93925 LOWER EXTREMITY STUDY: CPT | Mod: 26,,, | Performed by: RADIOLOGY

## 2021-11-03 PROCEDURE — 93970 EXTREMITY STUDY: CPT | Mod: 26,,, | Performed by: RADIOLOGY

## 2021-11-03 PROCEDURE — 93970 EXTREMITY STUDY: CPT | Mod: TC,PN

## 2021-11-17 ENCOUNTER — TELEPHONE (OUTPATIENT)
Dept: PHYSICAL MEDICINE AND REHAB | Facility: CLINIC | Age: 86
End: 2021-11-17
Payer: MEDICARE

## 2021-11-24 NOTE — PROGRESS NOTES
4175  [facility] is planning to accept pt if insurance auth is obtained.  Message has been sent to MD.  Pt is updated and agreeable to the dc plan including staying in a semi-private room at the facility.  Pt believes he will dc early next week.    [facility]  [facility]  [facility] - insurance auth will be started soon.  The facility may be able to admit as early as Friday if pt is ready to dc pending auth approval. (11/24 pm).  [phone], fax [phone]      SNFs not available to pt:  [facility] - denied  [facility] - denied   [facility] - denied  [facility] [place] - denied   Patient has upcoming appointment for lab review.

## 2022-01-13 ENCOUNTER — OFFICE VISIT (OUTPATIENT)
Dept: FAMILY MEDICINE | Facility: CLINIC | Age: 87
End: 2022-01-13
Payer: MEDICARE

## 2022-01-13 VITALS
WEIGHT: 187 LBS | DIASTOLIC BLOOD PRESSURE: 72 MMHG | SYSTOLIC BLOOD PRESSURE: 130 MMHG | OXYGEN SATURATION: 97 % | HEART RATE: 91 BPM | BODY MASS INDEX: 36.71 KG/M2 | RESPIRATION RATE: 18 BRPM | HEIGHT: 60 IN

## 2022-01-13 DIAGNOSIS — M53.3 SACROILIAC JOINT PAIN: ICD-10-CM

## 2022-01-13 DIAGNOSIS — G89.29 CHRONIC PAIN OF LEFT KNEE: ICD-10-CM

## 2022-01-13 DIAGNOSIS — G89.29 CHRONIC BILATERAL LOW BACK PAIN WITH LEFT-SIDED SCIATICA: ICD-10-CM

## 2022-01-13 DIAGNOSIS — M15.9 PRIMARY OSTEOARTHRITIS INVOLVING MULTIPLE JOINTS: ICD-10-CM

## 2022-01-13 DIAGNOSIS — G62.9 NEUROPATHY: Primary | ICD-10-CM

## 2022-01-13 DIAGNOSIS — M54.42 CHRONIC BILATERAL LOW BACK PAIN WITH LEFT-SIDED SCIATICA: ICD-10-CM

## 2022-01-13 DIAGNOSIS — M25.562 CHRONIC PAIN OF LEFT KNEE: ICD-10-CM

## 2022-01-13 PROCEDURE — 99214 OFFICE O/P EST MOD 30 MIN: CPT | Mod: S$GLB,,, | Performed by: FAMILY MEDICINE

## 2022-01-13 PROCEDURE — 1101F PR PT FALLS ASSESS DOC 0-1 FALLS W/OUT INJ PAST YR: ICD-10-PCS | Mod: CPTII,S$GLB,, | Performed by: FAMILY MEDICINE

## 2022-01-13 PROCEDURE — 99999 PR PBB SHADOW E&M-EST. PATIENT-LVL V: ICD-10-PCS | Mod: PBBFAC,,, | Performed by: FAMILY MEDICINE

## 2022-01-13 PROCEDURE — 1160F PR REVIEW ALL MEDS BY PRESCRIBER/CLIN PHARMACIST DOCUMENTED: ICD-10-PCS | Mod: CPTII,S$GLB,, | Performed by: FAMILY MEDICINE

## 2022-01-13 PROCEDURE — 1125F AMNT PAIN NOTED PAIN PRSNT: CPT | Mod: CPTII,S$GLB,, | Performed by: FAMILY MEDICINE

## 2022-01-13 PROCEDURE — 1101F PT FALLS ASSESS-DOCD LE1/YR: CPT | Mod: CPTII,S$GLB,, | Performed by: FAMILY MEDICINE

## 2022-01-13 PROCEDURE — 1125F PR PAIN SEVERITY QUANTIFIED, PAIN PRESENT: ICD-10-PCS | Mod: CPTII,S$GLB,, | Performed by: FAMILY MEDICINE

## 2022-01-13 PROCEDURE — 99214 PR OFFICE/OUTPT VISIT, EST, LEVL IV, 30-39 MIN: ICD-10-PCS | Mod: S$GLB,,, | Performed by: FAMILY MEDICINE

## 2022-01-13 PROCEDURE — 1160F RVW MEDS BY RX/DR IN RCRD: CPT | Mod: CPTII,S$GLB,, | Performed by: FAMILY MEDICINE

## 2022-01-13 PROCEDURE — 1159F MED LIST DOCD IN RCRD: CPT | Mod: CPTII,S$GLB,, | Performed by: FAMILY MEDICINE

## 2022-01-13 PROCEDURE — 3288F PR FALLS RISK ASSESSMENT DOCUMENTED: ICD-10-PCS | Mod: CPTII,S$GLB,, | Performed by: FAMILY MEDICINE

## 2022-01-13 PROCEDURE — 99999 PR PBB SHADOW E&M-EST. PATIENT-LVL V: CPT | Mod: PBBFAC,,, | Performed by: FAMILY MEDICINE

## 2022-01-13 PROCEDURE — 1159F PR MEDICATION LIST DOCUMENTED IN MEDICAL RECORD: ICD-10-PCS | Mod: CPTII,S$GLB,, | Performed by: FAMILY MEDICINE

## 2022-01-13 PROCEDURE — 3288F FALL RISK ASSESSMENT DOCD: CPT | Mod: CPTII,S$GLB,, | Performed by: FAMILY MEDICINE

## 2022-01-13 NOTE — PROGRESS NOTES
Subjective:       Patient ID: Yisel العلي is a 88 y.o. female.    Chief Complaint: Follow-up (6 week f/u)    HPI   Ms. العلي presents for 6 week follow-up. Having burning and paresthesias in her hands, right more than left. Nighttime, driving, can make it worse.    Pain in several areas - low back, buttocks, knees. Would like to do physical therapy at [facility]. Is open to seeing pain management to discuss injections.    Review of Systems   Constitutional: Negative for chills and fever.   Musculoskeletal: Positive for arthralgias and back pain.       Past Medical History:   Diagnosis Date    Anticoagulant long-term use     Arthritis     CAD (coronary artery disease)     Cancer     skin    Cataract     CKD (chronic kidney disease), stage III     HEARING LOSS     Hematoma complicating a procedure 1213    ant abd wall    [place] (hard of hearing)     BILAT AIDS    Hyperlipidemia     Hypertension     Meniere disease     Pneumonia     Retinal detachment     not sure which eye    Vertigo     Wears glasses      Past Surgical History:   Procedure Laterality Date    carotid surgery      left endarterectomy    CATARACT EXTRACTION      ou    CORONARY STENT PLACEMENT      x1    HYSTERECTOMY      KNEE SURGERY Right     PARTIAL NEPHRECTOMY  1013    benign left kidney neoplasm    RETINAL DETACHMENT SURGERY       Social History     Socioeconomic History    Marital status:    Tobacco Use    Smoking status: Former Smoker    Smokeless tobacco: Never Used   Substance and Sexual Activity    Alcohol use: Yes     Comment: occasional    Drug use: No   Social History Narrative    ADVANCED MD PLANS         Gyn Exam / Hysterectomy 10 [facility]/15/2019     Annual exam.  Chronic hypertension.  Obesity.  Hyperlipidemia.  Overactive bladder.  Incontinence.  Tenia corpora.  Epidermal inclusion cysts.    BMD at [facility].    Dr. Mark PCP.    Ditropan XL.    Triamcinolone cream.    Ketoconazole.         Visit Summary    Prescriptions:    SIG: ketoconazole 2 % topical shampoo, 1 days, Dispense #1 Bottle, 11 Refills    Directions: use once daily as directed    SIG: nystatin-triamcinolone 100,000-0.1 unit/gram-% topical ointment,  days, Dispense #120 Gram, 2 Refills    Directions: Apply 2 times a day to affected areas     [facility]     Annual exam.  Obesity.  Chronic hypertension.  Hyperlipidemia.  Vaginal atrophy.  Incontinence.    Mammogram and bone mineral density at [facility].    Continue medications.    Reviewed old.  Cardiologist labs.    Return to clinic for lipid panel and complete metabolic panel.    [place]-[facility].    Patient will followup with no cardiologist at [facility].    Visit Summary     [facility]     Epidermal inclusion cysts.  Candidiasis in groin.  Cystocele.    Urine for culture and sensitivity.    Probiotics over-the-counter.    Continue Premarin cream when necessary.    Visit Summary    Prescriptions:    SIG: clobetasol 0.05 % cream, 120 days, Dispense #120 Container, 0 Refills    Directions: Apply at bedtime    SIG: nystatin 100,000 unit/gram powder,  days, Dispense #120 Bottle, 0 Refills    Directions: Apply 2 times a day to affected areas     GYN Exam/Gzlpwwkiodwe621/7/2016     Annual exam.  Lichen sclerosus.  Enterocele.  Rectocele.    Eurax: Betamethasone compounded prescription given.  Return to clinic in 2 months to assess condition.     Family History   Problem Relation Age of Onset    Cancer Father         ?    Heart failure Father     Amblyopia Neg Hx     Blindness Neg Hx     Cataracts Neg Hx     Diabetes Neg Hx     Glaucoma Neg Hx     Hypertension Neg Hx     Macular degeneration Neg Hx     Retinal detachment Neg Hx     Strabismus Neg Hx     Stroke Neg Hx     Thyroid disease Neg Hx        Objective:      /72 (BP Location: Left arm, Patient Position: Sitting, BP Method: Medium  (Automatic))   Pulse 91   Resp 18   Ht 5' (1.524 m)   Wt 84.8 kg (187 lb)   SpO2 97%   BMI 36.52 kg/m²   Physical Exam  Vitals reviewed.   Constitutional:       General: She is not in acute distress.     Appearance: She is obese. She is not toxic-appearing.   HENT:      Head: Normocephalic and atraumatic.   Eyes:      General: No scleral icterus.        Right eye: No discharge.         Left eye: No discharge.      Conjunctiva/sclera: Conjunctivae normal.   Cardiovascular:      Rate and Rhythm: Normal rate.   Pulmonary:      Effort: Pulmonary effort is normal. No respiratory distress.   Neurological:      Mental Status: She is alert and oriented to person, place, and time.   Psychiatric:         Mood and Affect: Mood normal.         Behavior: Behavior normal.         Thought Content: Thought content normal.         Judgment: Judgment normal.         Assessment:       1. Neuropathy    2. Chronic bilateral low back pain with left-sided sciatica    3. Primary osteoarthritis involving multiple joints    4. Sacroiliac joint pain    5. Chronic pain of left knee        Plan:       Neuropathy    Chronic bilateral low back pain with left-sided sciatica  -     Ambulatory referral/consult to Physical/Occupational Therapy; Future; Expected date: 01/20/2022  -     X-Ray Lumbar Spine AP And Lateral; Future; Expected date: 01/13/2022    Primary osteoarthritis involving multiple joints  -     Ambulatory referral/consult to Physical/Occupational Therapy; Future; Expected date: 01/20/2022    Sacroiliac joint pain  -     Ambulatory referral/consult to Pain Clinic; Future; Expected date: 01/20/2022  -     X-Ray Sacroiliac Joints Complete; Future; Expected date: 01/13/2022    Chronic pain of left knee  -     Cancel: X-Ray Knee 1 or 2 View Left; Future; Expected date: 01/13/2022            Risks, benefits, and side effects were discussed with the patient. All questions were answered to the fullest satisfaction of the patient, and pt  verbalized understanding and agreement to treatment plan. Pt was to call with any new or worsening symptoms, or present to the ER.

## 2022-01-14 ENCOUNTER — HOSPITAL ENCOUNTER (OUTPATIENT)
Dept: RADIOLOGY | Facility: HOSPITAL | Age: 87
Discharge: HOME | End: 2022-01-14
Attending: FAMILY MEDICINE
Payer: MEDICARE

## 2022-01-14 DIAGNOSIS — M53.3 SACROILIAC JOINT PAIN: ICD-10-CM

## 2022-01-14 DIAGNOSIS — M54.42 CHRONIC BILATERAL LOW BACK PAIN WITH LEFT-SIDED SCIATICA: ICD-10-CM

## 2022-01-14 DIAGNOSIS — G89.29 CHRONIC BILATERAL LOW BACK PAIN WITH LEFT-SIDED SCIATICA: ICD-10-CM

## 2022-01-14 DIAGNOSIS — M25.562 CHRONIC PAIN OF LEFT KNEE: ICD-10-CM

## 2022-01-14 DIAGNOSIS — G89.29 CHRONIC PAIN OF LEFT KNEE: ICD-10-CM

## 2022-01-14 PROCEDURE — 72100 X-RAY EXAM L-S SPINE 2/3 VWS: CPT | Mod: 26,,, | Performed by: RADIOLOGY

## 2022-01-14 PROCEDURE — 72202 X-RAY EXAM SI JOINTS 3/> VWS: CPT | Mod: 26,,, | Performed by: RADIOLOGY

## 2022-01-14 PROCEDURE — 72202 XR SACROILIAC JOINTS COMPLETE: ICD-10-PCS | Mod: 26,,, | Performed by: RADIOLOGY

## 2022-01-14 PROCEDURE — 73562 X-RAY EXAM OF KNEE 3: CPT | Mod: 26,LT,, | Performed by: RADIOLOGY

## 2022-01-14 PROCEDURE — 72202 X-RAY EXAM SI JOINTS 3/> VWS: CPT | Mod: TC,PN

## 2022-01-14 PROCEDURE — 73562 X-RAY EXAM OF KNEE 3: CPT | Mod: TC,PN,LT

## 2022-01-14 PROCEDURE — 72100 X-RAY EXAM L-S SPINE 2/3 VWS: CPT | Mod: TC,PN

## 2022-01-14 PROCEDURE — 73562 XR KNEE 3 VIEW LEFT: ICD-10-PCS | Mod: 26,LT,, | Performed by: RADIOLOGY

## 2022-01-14 PROCEDURE — 72100 XR LUMBAR SPINE AP AND LATERAL: ICD-10-PCS | Mod: 26,,, | Performed by: RADIOLOGY

## 2022-01-19 ENCOUNTER — TELEPHONE (OUTPATIENT)
Dept: PAIN MEDICINE | Facility: CLINIC | Age: 87
End: 2022-01-19
Payer: MEDICARE

## 2022-01-19 NOTE — TELEPHONE ENCOUNTER
----- Message from Matthew Marguerite sent at 1/19/2022  4:48 PM CST -----  Contact: Self  Type:  Sooner Apoointment Request    Caller is requesting a sooner appointment.  Caller declined first available appointment listed below.  Caller will not accept being placed on the waitlist and is requesting a message be sent to doctor.    Name of Caller:  Patient  When is the first available appointment?  3/24  Symptoms:  back pain/hand/leg  Best Call Back Number:  [phone]  Additional Information:  PT states that she is in a lot of pain and her Dr recommend to see Dr. Call, and she states some imaging has been sent to the office already. Please call pt back at [phone] to try fit her in as soon as possible please.

## 2022-01-20 ENCOUNTER — TELEPHONE (OUTPATIENT)
Dept: PAIN MEDICINE | Facility: CLINIC | Age: 87
End: 2022-01-20
Payer: MEDICARE

## 2022-01-20 NOTE — PROGRESS NOTES
Attempted to reach patient at number provided to discuss x-ray results. No answer. Left message for patient to return call to clinic or reach out on patient portal.

## 2022-01-20 NOTE — TELEPHONE ENCOUNTER
----- Message from Matthew Marguerite sent at 1/20/2022 10:10 AM CST -----  Contact: Self  Type:  Sooner Apoointment Request    Caller is requesting a sooner appointment.  Caller declined first available appointment listed below.  Caller will not accept being placed on the waitlist and is requesting a message be sent to doctor.    Name of Caller:  Patient   When is the first available appointment?  3/24  Symptoms:  back pain   Best Call Back Number:  [phone]  Additional Information:  Pt states she needs to been sooner then this as she in a lot of pain. Please call pt back at [phone] and [phone], she states call both please to update and advise.

## 2022-02-07 ENCOUNTER — TELEPHONE (OUTPATIENT)
Dept: ORTHOPEDICS | Facility: CLINIC | Age: 87
End: 2022-02-07
Payer: MEDICARE

## 2022-02-07 ENCOUNTER — OFFICE VISIT (OUTPATIENT)
Dept: PAIN MEDICINE | Facility: CLINIC | Age: 87
End: 2022-02-07
Payer: MEDICARE

## 2022-02-07 VITALS
HEIGHT: 60 IN | HEART RATE: 68 BPM | WEIGHT: 188 LBS | SYSTOLIC BLOOD PRESSURE: 136 MMHG | BODY MASS INDEX: 36.91 KG/M2 | DIASTOLIC BLOOD PRESSURE: 72 MMHG

## 2022-02-07 DIAGNOSIS — M25.562 CHRONIC PAIN OF LEFT KNEE: ICD-10-CM

## 2022-02-07 DIAGNOSIS — M47.896 OTHER SPONDYLOSIS, LUMBAR REGION: Primary | ICD-10-CM

## 2022-02-07 DIAGNOSIS — M53.3 SACROILIAC JOINT PAIN: ICD-10-CM

## 2022-02-07 DIAGNOSIS — G89.29 CHRONIC PAIN OF LEFT KNEE: ICD-10-CM

## 2022-02-07 DIAGNOSIS — M51.36 DDD (DEGENERATIVE DISC DISEASE), LUMBAR: ICD-10-CM

## 2022-02-07 PROCEDURE — 1101F PT FALLS ASSESS-DOCD LE1/YR: CPT | Mod: CPTII,S$GLB,, | Performed by: ANESTHESIOLOGY

## 2022-02-07 PROCEDURE — 1159F PR MEDICATION LIST DOCUMENTED IN MEDICAL RECORD: ICD-10-PCS | Mod: CPTII,S$GLB,, | Performed by: ANESTHESIOLOGY

## 2022-02-07 PROCEDURE — 99204 PR OFFICE/OUTPT VISIT, NEW, LEVL IV, 45-59 MIN: ICD-10-PCS | Mod: S$GLB,,, | Performed by: ANESTHESIOLOGY

## 2022-02-07 PROCEDURE — 99204 OFFICE O/P NEW MOD 45 MIN: CPT | Mod: S$GLB,,, | Performed by: ANESTHESIOLOGY

## 2022-02-07 PROCEDURE — 1125F PR PAIN SEVERITY QUANTIFIED, PAIN PRESENT: ICD-10-PCS | Mod: CPTII,S$GLB,, | Performed by: ANESTHESIOLOGY

## 2022-02-07 PROCEDURE — 1125F AMNT PAIN NOTED PAIN PRSNT: CPT | Mod: CPTII,S$GLB,, | Performed by: ANESTHESIOLOGY

## 2022-02-07 PROCEDURE — 1159F MED LIST DOCD IN RCRD: CPT | Mod: CPTII,S$GLB,, | Performed by: ANESTHESIOLOGY

## 2022-02-07 PROCEDURE — 99999 PR PBB SHADOW E&M-EST. PATIENT-LVL IV: CPT | Mod: PBBFAC,,, | Performed by: ANESTHESIOLOGY

## 2022-02-07 PROCEDURE — 3288F PR FALLS RISK ASSESSMENT DOCUMENTED: ICD-10-PCS | Mod: CPTII,S$GLB,, | Performed by: ANESTHESIOLOGY

## 2022-02-07 PROCEDURE — 3288F FALL RISK ASSESSMENT DOCD: CPT | Mod: CPTII,S$GLB,, | Performed by: ANESTHESIOLOGY

## 2022-02-07 PROCEDURE — 99999 PR PBB SHADOW E&M-EST. PATIENT-LVL IV: ICD-10-PCS | Mod: PBBFAC,,, | Performed by: ANESTHESIOLOGY

## 2022-02-07 PROCEDURE — 1101F PR PT FALLS ASSESS DOC 0-1 FALLS W/OUT INJ PAST YR: ICD-10-PCS | Mod: CPTII,S$GLB,, | Performed by: ANESTHESIOLOGY

## 2022-02-07 NOTE — PROGRESS NOTES
Referring Physician: Sangeetha Arguello    PCP: Josefina Martinez DO    CC: back pain    HPI:   Yisel العلي is a 88 y.o. female with PMH significant for CAD s/p PCI, HTN, CKD, and hx of right knee arthroplasty presents for the evaluation of back pain. The patient reports that her pain began approximately 5-10 years ago after no inciting incident or trauma. The patient reports that her pain has worsened over the past two years. The patient localizes her pain to the area across her lower back. The patient reports of radiation into her hips. The patient describes her pain as an aching type of pain. The patient reports of intermittent numbness in her feet and numbness in her hands. The patient reports that her pain is a 6/10. Patient denies of any fecal incontinence, saddle anesthesia, or weakness.     Of note, the patient is previously saw an Ortho in [place] but she cannot recall the provider's name. She reports that she is not currently seeing an Ortho specialist.     Aggravating factors: walking, housework, prolonged standing    Mitigating factors: sitting, rest    Relevant Surgeries: no    Interventional Therapies: no    : Not applicable      Non-pharmacologic Treatment:     · Physical Therapy: yes; completed multiple sessions with good benefit.   · Ice/Heat: no  · TENS: no  · Massage: no  · Chiropractic care: yes; did without benefit   · Acupuncture: no         Pain Medications:         · Currently taking: Voltaren gel (uses for hands mostlyl)    · Has tried in the past:    · Opioids: no  · NSAIDS: no; instructed by Dr. Franklin not to take NSAIDS.   · Tylenol: yes; denies of benefit with Tylenol  · Muscle relaxants: no  · TCAs: no  · SNRIs: no  · Anticonvulsants: yes; gabapentin - could not tolerate side effects  · topical creams: yes    Anticoagulation: yes; baby ASA    ROS:  Review of Systems   Constitutional: Negative for chills and fever.   HENT: Negative for sore throat.    Eyes: Negative for visual  disturbance.   Respiratory: Negative for shortness of breath.    Cardiovascular: Negative for chest pain.   Gastrointestinal: Negative for nausea and vomiting.   Genitourinary: Negative for difficulty urinating.   Musculoskeletal: Positive for arthralgias and back pain.   Skin: Negative for rash.   Allergic/Immunologic: Negative for immunocompromised state.   Neurological: Positive for numbness. Negative for syncope.   Hematological: Does not bruise/bleed easily.   Psychiatric/Behavioral: Negative for suicidal ideas.        Past Medical History:   Diagnosis Date    Anticoagulant long-term use     Arthritis     CAD (coronary artery disease)     Cancer     skin    Cataract     CKD (chronic kidney disease), stage III     HEARING LOSS     Hematoma complicating a procedure 1213    ant abd wall    [place] (hard of hearing)     BILAT AIDS    Hyperlipidemia     Hypertension     Meniere disease     Pneumonia     Retinal detachment     not sure which eye    Vertigo     Wears glasses      Past Surgical History:   Procedure Laterality Date    carotid surgery      left endarterectomy    CATARACT EXTRACTION      ou    CORONARY STENT PLACEMENT      x1    HYSTERECTOMY      KNEE SURGERY Right     PARTIAL NEPHRECTOMY  1013    benign left kidney neoplasm    RETINAL DETACHMENT SURGERY       Family History   Problem Relation Age of Onset    Cancer Father         ?    Heart failure Father     Amblyopia Neg Hx     Blindness Neg Hx     Cataracts Neg Hx     Diabetes Neg Hx     Glaucoma Neg Hx     Hypertension Neg Hx     Macular degeneration Neg Hx     Retinal detachment Neg Hx     Strabismus Neg Hx     Stroke Neg Hx     Thyroid disease Neg Hx      Social History     Socioeconomic History    Marital status:    Tobacco Use    Smoking status: Former Smoker    Smokeless tobacco: Never Used   Substance and Sexual Activity    Alcohol use: Yes     Comment: occasional    Drug use: No   Social History  Narrative    ADVANCED MD PLANS         [facility]     Annual exam.  Chronic hypertension.  Obesity.  Hyperlipidemia.  Overactive bladder.  Incontinence.  Tenia corpora.  Epidermal inclusion cysts.    BMD at [facility].    Dr. Mark PCP.    Ditropan XL.    Triamcinolone cream.    Ketoconazole.        Visit Summary    Prescriptions:    SIG: ketoconazole 2 % topical shampoo, 1 days, Dispense #1 Bottle, 11 Refills    Directions: use once daily as directed    SIG: nystatin-triamcinolone 100,000-0.1 unit/gram-% topical ointment,  days, Dispense #120 Gram, 2 Refills    Directions: Apply 2 times a day to affected areas     Gyn [facility]     Annual exam.  Obesity.  Chronic hypertension.  Hyperlipidemia.  Vaginal atrophy.  Incontinence.    Mammogram and bone mineral density at [facility].    Continue medications.    Reviewed old.  Cardiologist labs.    Return to clinic for lipid panel and complete metabolic panel.    [place]-[facility].    Patient will followup with no cardiologist at [facility].    Visit Summary     [facility]N [id number] [facility]     Epidermal inclusion cysts.  Candidiasis in groin.  Cystocele.    Urine for culture and sensitivity.    Probiotics over-the-counter.    Continue Premarin cream when necessary.    Visit Summary    Prescriptions:    SIG: clobetasol 0.05 % cream, 120 days, Dispense #120 Container, 0 Refills    Directions: Apply at bedtime    SIG: nystatin 100,000 unit/gram powder,  days, Dispense #120 Bottle, 0 Refills    Directions: Apply 2 times a day to affected areas     GYN Exam/Hggytsmucpyu122/7/2016     Annual exam.  Lichen sclerosus.  Enterocele.  Rectocele.    Eurax: Betamethasone compounded prescription given.  Return to clinic in 2 months to assess condition.         Allergies: See med card    Vitals:    02/07/22 0808   BP: 136/72   Pulse: 68   Weight: 85.3 kg (188 lb)   Height: 5' (1.524 m)    PainSc:   6   PainLoc: Back         Physical exam:  Physical Exam  Vitals and nursing note reviewed.   Constitutional:       Appearance: She is not diaphoretic.   HENT:      Head: Normocephalic and atraumatic.   Eyes:      General:         Right eye: No discharge.         Left eye: No discharge.      Extraocular Movements: EOM normal.      Conjunctiva/sclera: Conjunctivae normal.   Cardiovascular:      Rate and Rhythm: Normal rate.   Pulmonary:      Effort: Pulmonary effort is normal. No respiratory distress.      Breath sounds: Normal breath sounds.   Abdominal:      Palpations: Abdomen is soft.   Musculoskeletal:      Left knee: Bony tenderness present.   Skin:     General: Skin is warm and dry.      Findings: No rash.   Neurological:      Mental Status: She is alert and oriented to person, place, and time.   Psychiatric:         Mood and Affect: Mood and affect normal.         Cognition and Memory: Memory normal.         Judgment: Judgment normal.          UPPER EXTREMITIES: Normal alignment, normal range of motion, no atrophy, no skin changes,  hair growth and nail growth normal and equal bilaterally. No swelling, no tenderness.    LOWER EXTREMITIES:  Normal alignment, normal range of motion, no atrophy, no skin changes,  hair growth and nail growth normal and equal bilaterally. No swelling, no tenderness.    LUMBAR SPINE  Lumbar spine: ROM is full with flexion but limited with extension and oblique extension with increased pain with extension     ((--)) Supine straight leg raise    ((+)) Facet loading   Internal and external rotation of the hip causes no increased pain on either side.  Myofascial exam: No tenderness to palpation across lumbar paraspinous muscles.    ((+)) TTP at the SI joint  ((+)) MEAGAN's test  One leg stand    ((+)) Distraction test  ((+)) Thigh thrust    CRANIAL NERVES:  II:  PERRL bilaterally,   III,IV,VI: EOMI.    V:  Facial sensation equal bilaterally  VII:  Facial motor function  normal.  VIII:  Hearing equal to finger rub bilaterally  IX/X: Gag normal, palate symmetric  XI:  Shoulder shrug equal, head turn equal  XII:  Tongue midline without fasciculations      MOTOR: Tone and bulk: normal bilateral upper and lower Strength: normal   Delt Bi Tri WE WF     R 5 5 5 5 5 5   L 5 5 5 5 5 5     IP ADD ABD Quad TA Gas HAM  R 5 5 5 5 5 5 5  L 5 5 5 5 5 5 5    SENSATION: Light touch and pinprick intact bilaterally  REFLEXES: normal, symmetric, nonbrisk.  Toes down, no clonus. Negative wang's sign bilaterally.  GAIT: antalgic gait; uses a single point cane for assistance with ambulation    Imaging:  X-ray lumbar spine (1/14/2022):  The alignment is normal.  The vertebral bodies are intact without evidence of fracture or compression.  There is disc space narrowing identified at L2-3, L3-4 and L4-5 with spondylosis most prominent at L4-5.  No spondylolisthesis is seen.     Impression:     Chronic degenerative disc disease with spondylosis at L2-3, L3-4 and L4-5.    X-ray sacroiliac joints (1/14/2022):  A fracture of the sacrum is not seen.  On the right there is irregular sclerosis identified along the iliac side of the sacroiliac joint consistent with chronic sacroiliitis.  Similar finding is not seen on the left.  Marked joint widening or bone erosion is not seen and bridging spurs are not seen.  The patient is noted have degenerative disc disease at L4-5.     Impression:     Chronic right sacroiliitis.  Degenerative disc disease at L4-5.    X-ray left knee (1/14/2022):  Moderate tricompartmental degenerative osteoarthrosis with joint space narrowing and osteophyte formation.  There is a subtle oblique lucency at the level of the tibial spines visualized best on the AP view suspicious for a subacute nondisplaced fracture.  Calcification of the hyaline cartilage consistent with chondrocalcinosis.     There is a small suprapatellar effusion.  Vascular calcifications are  present.     Impression:     1. Findings suspicious for subtle nondisplaced oblique fracture of the tibial plateau.  2. Moderate tricompartmental degenerative osteoarthrosis.  3. Small suprapatellar effusion.  4. Chondrocalcinosis.    MRI Lumbar Spine without contrast (5/5/2014):  Sagittal images are obtained with T1, T2 and fat suppressed T2 weighted.  Axial images are obtained with T1 and T2 weighting.     The alignment is normal.  The vertebral bodies are intact without evidence of fracture, compression or bone marrow replacement.     There is mild disk space narrowing identified at L2-3 and L3-4 and moderate narrowing identified at L4 -5 consistent with degenerative disk disease with decreased MR signal from the disk.  Bone marrow changes of L4 and L5 are consistent with chronic   degenerative disk disease.     At L3-4 there is some hypertrophy of the facets more prominent on the right with mild spinal canal stenosis.     At L4-5 there is circumferential disk bulge short pedicles and hypertrophy which is more prominent on the left with left lower recess and neural foraminal stenosis and milder right neural foraminal stenosis.  At L5-S1 stenosis is seen.  IMPRESSION:    At L4-5 there is circumferential disk bulge and hypertrophy of the facets and ligament flavum with short pedicles producing mild right neuroforaminal stenosis and more prominent left lateral recess and neural foraminal stenosis.       At L3-4 there is hypertrophy of the facets more prominent on the right with an mild spinal canal stenosis.    Assessment: Yisel العلي is a 88 y.o. female with PMH significant for CAD s/p PCI, HTN, CKD, and hx of right knee arthroplasty presents for the evaluation of back pain. X-ray of the lumbar spine was significant for multi-level DDD and facet arthropathy. X-ray of the SI joints was significant for chronic right sacroiliitis. I suspect all the aforementioned pathologies are contributing to her current pain.  Of interest, the patient has exquisite TTP at medial aspect of her left knee. The patient reports that her left knee pain is rather significant. X-ray of the left knee suggestive of possible tibial plateau fracture. The patient reports that she was never informed of her X-ray results. The patient reports that she is not currently under the care of an Ortho specialist. Treatment plan outlined below.       Plan:  -  Avoid NSAIDS given renal and cardiac co-morbidities.  - Ortho referral to Dr. Bautista for evaluation of left knee pathology  - I have stressed the importance of physical activity and a home exercise plan to help with chronic pain and improve health.  - Defer steroid injections until possible left knee fracture can be evaluated.   - RTC in 4 weeks for follow-up. Plan to address lumbar spine pathology in the future    Thank you for referring this interesting patient, and I look forward to continuing to collaborate in her care.    Madi Call MD  Pain Management

## 2022-02-18 ENCOUNTER — OFFICE VISIT (OUTPATIENT)
Dept: ORTHOPEDICS | Facility: CLINIC | Age: 87
End: 2022-02-18
Payer: MEDICARE

## 2022-02-18 VITALS — WEIGHT: 188 LBS | HEIGHT: 60 IN | BODY MASS INDEX: 36.91 KG/M2 | RESPIRATION RATE: 15 BRPM

## 2022-02-18 DIAGNOSIS — M11.262 CHONDROCALCINOSIS OF LEFT KNEE: ICD-10-CM

## 2022-02-18 DIAGNOSIS — G89.29 CHRONIC PAIN OF LEFT KNEE: ICD-10-CM

## 2022-02-18 DIAGNOSIS — M25.562 CHRONIC PAIN OF LEFT KNEE: ICD-10-CM

## 2022-02-18 DIAGNOSIS — S82.115A CLOSED NONDISPLACED FRACTURE OF SPINE OF LEFT TIBIA, INITIAL ENCOUNTER: ICD-10-CM

## 2022-02-18 DIAGNOSIS — M17.12 PRIMARY OSTEOARTHRITIS OF LEFT KNEE: Primary | ICD-10-CM

## 2022-02-18 DIAGNOSIS — M70.52 PES ANSERINUS BURSITIS OF LEFT KNEE: ICD-10-CM

## 2022-02-18 PROCEDURE — 99999 PR PBB SHADOW E&M-EST. PATIENT-LVL IV: ICD-10-PCS | Mod: PBBFAC,,, | Performed by: ORTHOPAEDIC SURGERY

## 2022-02-18 PROCEDURE — 1160F RVW MEDS BY RX/DR IN RCRD: CPT | Mod: CPTII,S$GLB,, | Performed by: ORTHOPAEDIC SURGERY

## 2022-02-18 PROCEDURE — 20610 DRAIN/INJ JOINT/BURSA W/O US: CPT | Mod: LT,S$GLB,, | Performed by: ORTHOPAEDIC SURGERY

## 2022-02-18 PROCEDURE — 99999 PR PBB SHADOW E&M-EST. PATIENT-LVL IV: CPT | Mod: PBBFAC,,, | Performed by: ORTHOPAEDIC SURGERY

## 2022-02-18 PROCEDURE — 1101F PT FALLS ASSESS-DOCD LE1/YR: CPT | Mod: CPTII,S$GLB,, | Performed by: ORTHOPAEDIC SURGERY

## 2022-02-18 PROCEDURE — 1101F PR PT FALLS ASSESS DOC 0-1 FALLS W/OUT INJ PAST YR: ICD-10-PCS | Mod: CPTII,S$GLB,, | Performed by: ORTHOPAEDIC SURGERY

## 2022-02-18 PROCEDURE — 1160F PR REVIEW ALL MEDS BY PRESCRIBER/CLIN PHARMACIST DOCUMENTED: ICD-10-PCS | Mod: CPTII,S$GLB,, | Performed by: ORTHOPAEDIC SURGERY

## 2022-02-18 PROCEDURE — 1125F PR PAIN SEVERITY QUANTIFIED, PAIN PRESENT: ICD-10-PCS | Mod: CPTII,S$GLB,, | Performed by: ORTHOPAEDIC SURGERY

## 2022-02-18 PROCEDURE — 3288F PR FALLS RISK ASSESSMENT DOCUMENTED: ICD-10-PCS | Mod: CPTII,S$GLB,, | Performed by: ORTHOPAEDIC SURGERY

## 2022-02-18 PROCEDURE — 1159F PR MEDICATION LIST DOCUMENTED IN MEDICAL RECORD: ICD-10-PCS | Mod: CPTII,S$GLB,, | Performed by: ORTHOPAEDIC SURGERY

## 2022-02-18 PROCEDURE — 1125F AMNT PAIN NOTED PAIN PRSNT: CPT | Mod: CPTII,S$GLB,, | Performed by: ORTHOPAEDIC SURGERY

## 2022-02-18 PROCEDURE — 20610 LARGE JOINT ASPIRATION/INJECTION: L KNEE: ICD-10-PCS | Mod: LT,S$GLB,, | Performed by: ORTHOPAEDIC SURGERY

## 2022-02-18 PROCEDURE — 99204 PR OFFICE/OUTPT VISIT, NEW, LEVL IV, 45-59 MIN: ICD-10-PCS | Mod: 25,S$GLB,, | Performed by: ORTHOPAEDIC SURGERY

## 2022-02-18 PROCEDURE — 3288F FALL RISK ASSESSMENT DOCD: CPT | Mod: CPTII,S$GLB,, | Performed by: ORTHOPAEDIC SURGERY

## 2022-02-18 PROCEDURE — 1159F MED LIST DOCD IN RCRD: CPT | Mod: CPTII,S$GLB,, | Performed by: ORTHOPAEDIC SURGERY

## 2022-02-18 PROCEDURE — 99204 OFFICE O/P NEW MOD 45 MIN: CPT | Mod: 25,S$GLB,, | Performed by: ORTHOPAEDIC SURGERY

## 2022-02-18 RX ORDER — TRIAMCINOLONE ACETONIDE 40 MG/ML
40 INJECTION, SUSPENSION INTRA-ARTICULAR; INTRAMUSCULAR
Status: DISCONTINUED | OUTPATIENT
Start: 2022-02-18 | End: 2022-02-18 | Stop reason: HOSPADM

## 2022-02-18 RX ADMIN — TRIAMCINOLONE ACETONIDE 40 MG: 40 INJECTION, SUSPENSION INTRA-ARTICULAR; INTRAMUSCULAR at 01:02

## 2022-02-18 NOTE — PROCEDURES
Large Joint Aspiration/Injection: L anserine bursa    Date/Time: 2/18/2022 1:45 PM  Performed by: Erick Bautista DO  Authorized by: Erick Bautista, DO     Consent Done?:  Yes (Verbal)  Indications:  Diagnostic evaluation and pain  Site marked: the procedure site was marked    Timeout: prior to procedure the correct patient, procedure, and site was verified    Prep: patient was prepped and draped in usual sterile fashion      Details:  Needle Size:  22 G  Ultrasonic Guidance for needle placement?: No    Approach:  Anteromedial  Location:  Knee  Site:  L anserine bursa  Medications:  40 mg triamcinolone acetonide 40 mg/mL  Patient tolerance:  Patient tolerated the procedure well with no immediate complications

## 2022-02-18 NOTE — PROGRESS NOTES
Subjective:      Patient ID: Yisel العلي is a 88 y.o. female.    Chief Complaint: Knee Pain (Left knee)    Referring Provider: Madi Call Md  [address] [place] [address] Dr  [address]  [place] [address]    HPI:  Ms. العلي is an 88-year-old lady who presented today for evaluation of approximately 10 years of left knee pain worsening intensity over the last 3 years.  She denied trauma.  Standing and walking increases her symptoms while rest improves them.  She denies swelling giving way or locking.  She has taken NSAIDs with help.  She wore a sleeve brace with help.  She has not done physical therapy nor had injections.  She can ambulate approximately half a block and climb 1-2 stairs.  She uses a cane when ambulating.    Past Medical History:   Diagnosis Date    Anticoagulant long-term use     Arthritis     CAD (coronary artery disease)     Cancer face     skin    Cataract     CKD (chronic kidney disease), stage III     HEARING LOSS     Hematoma complicating a procedure 1213    ant abd wall    [place] (hard of hearing)     BILAT AIDS    Hyperlipidemia     Hypertension     Meniere disease     Pneumonia     Retinal detachment     not sure which eye    Vertigo      *  *  *  * Wears glasses  Right nephrolithiasis  Hiatal hernia  Gout  Pain management      Past Surgical History:   Procedure Laterality Date    carotid surgery      left endarterectomy    CATARACT EXTRACTION      ou    CORONARY STENT PLACEMENT      x1    HYSTERECTOMY      KNEE SURGERY right total knee Right     PARTIAL NEPHRECTOMY  1013    benign left kidney neoplasm     *  *  *  * RETINAL DETACHMENT SURGERY  T&A  COLONOSCOPY  LIPOMA EXCISION POSTERIOR NECK  EXCISION SKIN CANCER FROM FACE         Review of patient's allergies indicates:   Allergen Reactions    Codeine Other (See Comments)     dosent remember reaction;maybe nausea       Social History     Occupational History    Retired teacher   Tobacco Use    Smoking status:  Former Smoker    Smokeless tobacco: Never Used   Substance and Sexual Activity    Alcohol use: Yes     Comment: occasional    Drug use: No    Sexual activity: Not on file      Family History   Problem Relation Age of Onset    Cancer Father         ?    Heart failure Father     Amblyopia Neg Hx     Blindness Neg Hx     Cataracts Neg Hx     Diabetes Neg Hx     Glaucoma Neg Hx     Hypertension Neg Hx     Macular degeneration Neg Hx     Retinal detachment Neg Hx     Strabismus Neg Hx     Stroke Neg Hx     Thyroid disease Neg Hx        Previous Hospitalizations:  Childbirth, right total knee arthroplasty    ROS:   Review of Systems   Constitutional: Negative for chills and fever.   HENT: Positive for hearing loss. Negative for congestion.    Eyes: Negative for blurred vision and double vision.   Cardiovascular: Negative for chest pain and cyanosis.   Respiratory: Negative for cough and shortness of breath.    Endocrine: Negative for polydipsia.   Hematologic/Lymphatic: Negative for adenopathy.   Skin: Positive for skin cancer. Negative for flushing and itching.   Musculoskeletal: Positive for gout and joint pain. Negative for joint swelling.   Gastrointestinal: Negative for constipation, diarrhea and heartburn.   Genitourinary: Negative for nocturia.   Neurological: Negative for headaches and seizures.   Psychiatric/Behavioral: Negative for depression and substance abuse. The patient is not nervous/anxious.    Allergic/Immunologic: Negative for environmental allergies.           Objective:      Physical Exam:   General: AAOx3.  No acute distress  HEENT: Normocephalic, PEARLA EOMI.  Fair Dentition  Neck: Supple, No JVD  Chest: Symetric, equal excursion on inspiration  Abdomen: Soft NTND  Vascular:  Pulses intact and equal bilaterally.  Capillary refill less than 3 seconds and equal bilaterally  Neurologic:  Pinprick and soft touch intact and equal bilaterally  Integment:  No ecchymosis, no  errythema  Extremity:  Knee:  Extension/flexion equal bilaterally 0/118 degrees.  Mild effusion left knee.  Crepitus with motion both knees.  Mild Baker cyst left knee.  Negative patellar load/compression bilaterally.  Negative patella apprehension/relocation bilaterally.  Mild increased excursion with Lachman's/drawer left knee.  Mild increased excursion with valgus stressing left knee.  Varus stressing equal bilaterally with endpoint.  Positive medial joint line tenderness left knee.  Amy negative both knees.  [place]t positive left knee.  Tender at the anserine insertion left knee.  Swelling at the anserine insertion left knee.  Radiography:  Personally reviewed x-rays of the left knee completed on 01/14/2022 showed CHI compartment arthritic changes with near bone-on-bone articulation and osteophytes.  Chondrocalcinosis is present at the medial and lateral compartments.  There is also a questionable tibial spine fracture      Assessment:       Impression:      1. Primary osteoarthritis of left knee    2. Chondrocalcinosis of left knee    3. Pes anserinus bursitis of left knee    4. Closed nondisplaced fracture of spine of left tibia, initial encounter    5. Chronic pain of left knee          Plan:       1.  Discussed physical examination and radiographic findings with the patient. Yisel understands that she has arthritis of her knee along with chondrocalcinosis and pes anserine bursitis.  There is also a questionable fracture of the tibial spine.  Treatment alternatives and outcomes were discussed with the patient she understands she could be treated conservatively with observation, activity modification, NSAIDs, bracing, physical therapy, injections, or she could consider surgical intervention such as total knee arthroplasty or ORIF of the tibial spine.  In order to fully evaluate her knee a CT scan is warranted to evaluate the tibial spine potential fracture.  2. CT scan left knee.  3. Offered a steroid  injection left knee, she elected to proceed.  4. Offered a steroid injection to the anserine insertion left knee, she elected to proceed.  5. Wear brace.  6. If she has to take any NSAIDs as per her PCM recommendations.  7. Follow up after CT scan is completed

## 2022-02-18 NOTE — PROCEDURES
Large Joint Aspiration/Injection: L knee    Date/Time: 2/18/2022 1:45 PM  Performed by: Erick Bautista DO  Authorized by: Erick Bautista, DO     Consent Done?:  Yes (Verbal)  Indications:  Arthritis, diagnostic evaluation, joint swelling and pain  Site marked: the procedure site was marked    Timeout: prior to procedure the correct patient, procedure, and site was verified    Prep: patient was prepped and draped in usual sterile fashion      Details:  Needle Size:  22 G  Ultrasonic Guidance for needle placement?: No    Approach:  Anterolateral  Location:  Knee  Site:  L knee  Medications:  40 mg triamcinolone acetonide 40 mg/mL  Patient tolerance:  Patient tolerated the procedure well with no immediate complications

## 2022-02-28 ENCOUNTER — HOSPITAL ENCOUNTER (OUTPATIENT)
Dept: RADIOLOGY | Facility: HOSPITAL | Age: 87
Discharge: HOME | End: 2022-02-28
Attending: ORTHOPAEDIC SURGERY
Payer: MEDICARE

## 2022-02-28 DIAGNOSIS — M25.562 CHRONIC PAIN OF LEFT KNEE: ICD-10-CM

## 2022-02-28 DIAGNOSIS — G89.29 CHRONIC PAIN OF LEFT KNEE: ICD-10-CM

## 2022-02-28 PROCEDURE — 73700 CT LOWER EXTREMITY W/O DYE: CPT | Mod: TC,LT

## 2022-02-28 PROCEDURE — 73700 CT KNEE WITHOUT CONTRAST LEFT: ICD-10-PCS | Mod: 26,LT,, | Performed by: RADIOLOGY

## 2022-02-28 PROCEDURE — 73700 CT LOWER EXTREMITY W/O DYE: CPT | Mod: 26,LT,, | Performed by: RADIOLOGY

## 2022-03-10 ENCOUNTER — TELEPHONE (OUTPATIENT)
Dept: ORTHOPEDICS | Facility: CLINIC | Age: 87
End: 2022-03-10
Payer: MEDICARE

## 2022-03-11 ENCOUNTER — OFFICE VISIT (OUTPATIENT)
Dept: ORTHOPEDICS | Facility: CLINIC | Age: 87
End: 2022-03-11
Payer: MEDICARE

## 2022-03-11 VITALS — OXYGEN SATURATION: 100 % | WEIGHT: 188.06 LBS | HEIGHT: 60 IN | RESPIRATION RATE: 18 BRPM | BODY MASS INDEX: 36.92 KG/M2

## 2022-03-11 DIAGNOSIS — G89.29 CHRONIC PAIN OF LEFT KNEE: ICD-10-CM

## 2022-03-11 DIAGNOSIS — M70.52 PES ANSERINUS BURSITIS OF LEFT KNEE: ICD-10-CM

## 2022-03-11 DIAGNOSIS — M17.12 PRIMARY OSTEOARTHRITIS OF LEFT KNEE: Primary | ICD-10-CM

## 2022-03-11 DIAGNOSIS — M25.562 CHRONIC PAIN OF LEFT KNEE: ICD-10-CM

## 2022-03-11 DIAGNOSIS — M11.262 CHONDROCALCINOSIS OF LEFT KNEE: ICD-10-CM

## 2022-03-11 PROCEDURE — 1126F AMNT PAIN NOTED NONE PRSNT: CPT | Mod: CPTII,S$GLB,, | Performed by: ORTHOPAEDIC SURGERY

## 2022-03-11 PROCEDURE — 99213 PR OFFICE/OUTPT VISIT, EST, LEVL III, 20-29 MIN: ICD-10-PCS | Mod: S$GLB,,, | Performed by: ORTHOPAEDIC SURGERY

## 2022-03-11 PROCEDURE — 99999 PR PBB SHADOW E&M-EST. PATIENT-LVL III: ICD-10-PCS | Mod: PBBFAC,,, | Performed by: ORTHOPAEDIC SURGERY

## 2022-03-11 PROCEDURE — 1159F PR MEDICATION LIST DOCUMENTED IN MEDICAL RECORD: ICD-10-PCS | Mod: CPTII,S$GLB,, | Performed by: ORTHOPAEDIC SURGERY

## 2022-03-11 PROCEDURE — 99999 PR PBB SHADOW E&M-EST. PATIENT-LVL III: CPT | Mod: PBBFAC,,, | Performed by: ORTHOPAEDIC SURGERY

## 2022-03-11 PROCEDURE — 3288F PR FALLS RISK ASSESSMENT DOCUMENTED: ICD-10-PCS | Mod: CPTII,S$GLB,, | Performed by: ORTHOPAEDIC SURGERY

## 2022-03-11 PROCEDURE — 1159F MED LIST DOCD IN RCRD: CPT | Mod: CPTII,S$GLB,, | Performed by: ORTHOPAEDIC SURGERY

## 2022-03-11 PROCEDURE — 3288F FALL RISK ASSESSMENT DOCD: CPT | Mod: CPTII,S$GLB,, | Performed by: ORTHOPAEDIC SURGERY

## 2022-03-11 PROCEDURE — 1126F PR PAIN SEVERITY QUANTIFIED, NO PAIN PRESENT: ICD-10-PCS | Mod: CPTII,S$GLB,, | Performed by: ORTHOPAEDIC SURGERY

## 2022-03-11 PROCEDURE — 1101F PT FALLS ASSESS-DOCD LE1/YR: CPT | Mod: CPTII,S$GLB,, | Performed by: ORTHOPAEDIC SURGERY

## 2022-03-11 PROCEDURE — 99213 OFFICE O/P EST LOW 20 MIN: CPT | Mod: S$GLB,,, | Performed by: ORTHOPAEDIC SURGERY

## 2022-03-11 PROCEDURE — 1101F PR PT FALLS ASSESS DOC 0-1 FALLS W/OUT INJ PAST YR: ICD-10-PCS | Mod: CPTII,S$GLB,, | Performed by: ORTHOPAEDIC SURGERY

## 2022-03-11 NOTE — PROGRESS NOTES
Subjective:      Patient ID: Yisel العلي is a 88 y.o. female.    Chief Complaint: Pain and Results of the Left Knee      HPI:  Ms. العلي returned today for re-evaluation of her left knee.  At her last visit on 02/18/2022 there was a concern that she might have a tibial spine fracture she was forwarded for a CT scan to evaluate her knee.  She states she still has some swelling at the pes anserine insertion she was given a steroid injection her last visit which gave her some pain relief.  She has sleeve braces at home but does not wear them    ROS:  New diagnosis/surgery/prescriptions since last office visit on 02/18/2022.  Constitutional: Negative for chills and fever.   HENT: Positive for hearing loss. Negative for congestion.    Eyes: Negative for blurred vision and double vision.   Cardiovascular: Negative for chest pain and cyanosis.   Respiratory: Negative for cough and shortness of breath.    Endocrine: Negative for polydipsia.   Hematologic/Lymphatic: Negative for adenopathy.   Skin: Positive for skin cancer. Negative for flushing and itching.   Musculoskeletal: Positive for gout and joint pain. Negative for joint swelling.   Gastrointestinal: Negative for constipation, diarrhea and heartburn.   Genitourinary: Negative for nocturia.   Neurological: Negative for headaches and seizures.   Psychiatric/Behavioral: Negative for depression and substance abuse. The patient is not nervous/anxious.    Allergic/Immunologic: Negative for environmental allergies.       Objective:      Physical Exam:   General: AAOx3.  No acute distress  Vascular:  Pulses intact and equal bilaterally.  Capillary refill less than 3 seconds and equal bilaterally  Neurologic:  Pinprick and soft touch intact and equal bilaterally  Integment:  No ecchymosis, no errythema  Extremity: Knee:  Extension/flexion equal bilaterally 0/118 degrees.  Mild effusion left knee.  Crepitus with motion both knees.  Mild Baker cyst left knee.  Negative  patellar load/compression bilaterally.  Negative patella apprehension/relocation bilaterally.  Mild increased excursion with Lachman's/drawer left knee.  Mild increased excursion with valgus stressing left knee.  Varus stressing equal bilaterally with endpoint.  Positive medial joint line tenderness left knee.  Amy negative both knees.  [place] positive left knee.  Tender at the anserine insertion left knee.  Swelling at the anserine insertion left knee.  Radiography:  Personally reviewed CT scan of the left knee completed on 02/28/2022 showed tricompartmental arthritic changes with osteophytes was also joint mice.  No fractures noted      Assessment:       Impression:     1. Primary osteoarthritis of left knee    2. Chondrocalcinosis of left knee    3. Pes anserinus bursitis of left knee    4. Chronic pain of left knee          Plan:       1.  Discussed physical examination and radiographic findings with the patient. Yisel understands that she has arthritis and pes anserine bursitis of her knee.  Treatment alternatives and outcomes were discussed with the patient she understands she could continue with conservative management such as observation, activity modification, NSAIDs, bracing, physical therapy, injections, or she could consider surgical intervention such as arthroscopy versus artificial knee replacement.  If she were to proceed with any surgery her best treatment option would be total knee replacement as doubtful that arthroscopy could help her.  2. Recommend she wear a neoprene knee sleeve brace on her left knee to help with some of the compression.  She states she did not like wearing them because they slipped down an Ace wrap was placed on her leg she stated that it helped.  3. All NSAIDs per PCM.  4. Continue to ambulate as tolerated.  5. Follow up p.r.n..

## 2022-03-17 ENCOUNTER — OFFICE VISIT (OUTPATIENT)
Dept: PODIATRY | Facility: CLINIC | Age: 87
End: 2022-03-17
Payer: MEDICARE

## 2022-03-17 VITALS
DIASTOLIC BLOOD PRESSURE: 66 MMHG | HEIGHT: 60 IN | WEIGHT: 185 LBS | SYSTOLIC BLOOD PRESSURE: 163 MMHG | HEART RATE: 62 BPM | TEMPERATURE: 98 F | BODY MASS INDEX: 36.32 KG/M2

## 2022-03-17 DIAGNOSIS — G60.9 IDIOPATHIC PERIPHERAL NEUROPATHY: ICD-10-CM

## 2022-03-17 DIAGNOSIS — M1A.9XX1 GOUTY TOPHI: Primary | ICD-10-CM

## 2022-03-17 PROCEDURE — 99999 PR PBB SHADOW E&M-EST. PATIENT-LVL IV: ICD-10-PCS | Mod: PBBFAC,,, | Performed by: PODIATRIST

## 2022-03-17 PROCEDURE — 1159F MED LIST DOCD IN RCRD: CPT | Mod: CPTII,S$GLB,, | Performed by: PODIATRIST

## 2022-03-17 PROCEDURE — 1125F AMNT PAIN NOTED PAIN PRSNT: CPT | Mod: CPTII,S$GLB,, | Performed by: PODIATRIST

## 2022-03-17 PROCEDURE — 1159F PR MEDICATION LIST DOCUMENTED IN MEDICAL RECORD: ICD-10-PCS | Mod: CPTII,S$GLB,, | Performed by: PODIATRIST

## 2022-03-17 PROCEDURE — 1160F PR REVIEW ALL MEDS BY PRESCRIBER/CLIN PHARMACIST DOCUMENTED: ICD-10-PCS | Mod: CPTII,S$GLB,, | Performed by: PODIATRIST

## 2022-03-17 PROCEDURE — 1160F RVW MEDS BY RX/DR IN RCRD: CPT | Mod: CPTII,S$GLB,, | Performed by: PODIATRIST

## 2022-03-17 PROCEDURE — 99999 PR PBB SHADOW E&M-EST. PATIENT-LVL IV: CPT | Mod: PBBFAC,,, | Performed by: PODIATRIST

## 2022-03-17 PROCEDURE — 99213 OFFICE O/P EST LOW 20 MIN: CPT | Mod: S$GLB,,, | Performed by: PODIATRIST

## 2022-03-17 PROCEDURE — 99213 PR OFFICE/OUTPT VISIT, EST, LEVL III, 20-29 MIN: ICD-10-PCS | Mod: S$GLB,,, | Performed by: PODIATRIST

## 2022-03-17 PROCEDURE — 1125F PR PAIN SEVERITY QUANTIFIED, PAIN PRESENT: ICD-10-PCS | Mod: CPTII,S$GLB,, | Performed by: PODIATRIST

## 2022-03-27 PROBLEM — G60.9 IDIOPATHIC PERIPHERAL NEUROPATHY: Status: ACTIVE | Noted: 2022-03-27

## 2022-03-27 NOTE — PROGRESS NOTES
Subjective:       Patient ID: Yisel العلي is a 88 y.o. female.    Chief Complaint: Foot Pain and Foot Problem   Patient presents today with a complaint of a painfully swollen and red 4th toe right foot.  Patient has a history of gout now she has got an area on the top of the 3rd toe left.    Past Medical History:   Diagnosis Date    Anticoagulant long-term use     Arthritis     CAD (coronary artery disease)     Cancer     skin    Cataract     CKD (chronic kidney disease), stage III     HEARING LOSS     Hematoma complicating a procedure 1213    ant abd wall    [place] (hard of hearing)     BILAT AIDS    Hyperlipidemia     Hypertension     Meniere disease     Pneumonia     Retinal detachment     not sure which eye    Vertigo     Wears glasses      Past Surgical History:   Procedure Laterality Date    carotid surgery      left endarterectomy    CATARACT EXTRACTION      ou    CORONARY STENT PLACEMENT      x1    HYSTERECTOMY      KNEE SURGERY Right     PARTIAL NEPHRECTOMY  1013    benign left kidney neoplasm    RETINAL DETACHMENT SURGERY       Family History   Problem Relation Age of Onset    Cancer Father         ?    Heart failure Father     Amblyopia Neg Hx     Blindness Neg Hx     Cataracts Neg Hx     Diabetes Neg Hx     Glaucoma Neg Hx     Hypertension Neg Hx     Macular degeneration Neg Hx     Retinal detachment Neg Hx     Strabismus Neg Hx     Stroke Neg Hx     Thyroid disease Neg Hx      Social History     Socioeconomic History    Marital status:    Tobacco Use    Smoking status: Former Smoker    Smokeless tobacco: Never Used   Substance and Sexual Activity    Alcohol use: Yes     Comment: occasional    Drug use: No   Social History Narrative    ADVANCED MD PLANS         [facility]     Annual exam.  Chronic hypertension.  Obesity.  Hyperlipidemia.  Overactive bladder.  Incontinence.  Tenia corpora.  Epidermal inclusion cysts.    BMD  at [facility].    Dr. Mark PCP.    Ditropan XL.    Triamcinolone cream.    Ketoconazole.        Visit Summary    Prescriptions:    SIG: ketoconazole 2 % topical shampoo, 1 days, Dispense #1 Bottle, 11 Refills    Directions: use once daily as directed    SIG: nystatin-triamcinolone 100,000-0.1 unit/gram-% topical ointment,  days, Dispense #120 Gram, 2 Refills    Directions: Apply 2 times a day to affected areas     [facility]     Annual exam.  Obesity.  Chronic hypertension.  Hyperlipidemia.  Vaginal atrophy.  Incontinence.    Mammogram and bone mineral density at [facility].    Continue medications.    Reviewed old.  Cardiologist labs.    Return to clinic for lipid panel and complete metabolic panel.    [place].    Patient will followup with no cardiologist at [facility].    Visit Summary     [id number]     Epidermal inclusion cysts.  Candidiasis in groin.  Cystocele.    Urine for culture and sensitivity.    Probiotics over-the-counter.    Continue Premarin cream when necessary.    Visit Summary    Prescriptions:    SIG: clobetasol 0.05 % cream, 120 days, Dispense #120 Container, 0 Refills    Directions: Apply at bedtime    SIG: nystatin 100,000 unit/gram powder,  days, Dispense #120 Bottle, 0 Refills    Directions: Apply 2 times a day to affected areas     GYN Exam/Ojvaxwiazuvq538/7/2016     Annual exam.  Lichen sclerosus.  Enterocele.  Rectocele.    Eurax: Betamethasone compounded prescription given.  Return to clinic in 2 months to assess condition.       Current Outpatient Medications   Medication Sig Dispense Refill    ammonium lactate (LAC-HYDRIN) 12 % lotion       aspirin (ECOTRIN) 81 MG EC tablet Take 81 mg by mouth once daily.      diaper,brief,adult,disposable (ADULT BRIEFS - LARGE) Misc 1 each by Misc.(Non-Drug; Combo Route) route 5 (five) times daily. 300 each 11    ergocalciferol (ERGOCALCIFEROL) 50,000  unit Cap TAKE ONE CAPSULE BY MOUTH EVERY MONDAY AND FRIDAY 6 capsule 3    estradioL (ESTRACE) 0.01 % (0.1 mg/gram) vaginal cream Place 1 g vaginally once daily. For 2 weeks then twice weekly 42.5 g 11    LIDOcaine-prilocaine (EMLA) cream Apply topically as needed (for discomfort). 30 g 3    lisinopriL (PRINIVIL,ZESTRIL) 40 MG tablet Take 1 tablet (40 mg total) by mouth once daily. 90 tablet 2    nitroGLYCERIN 0.1 mg/hr TD PT24 (NITRODUR) 0.1 mg/hr PT24 Place 1 patch onto the skin once daily. 30 patch 0     No current facility-administered medications for this visit.     Review of patient's allergies indicates:   Allergen Reactions    Codeine Other (See Comments)     dosent remember reaction;maybe nausea       Review of Systems   Musculoskeletal: Positive for arthralgias and joint swelling.   All other systems reviewed and are negative.      Objective:      Vitals:    03/17/22 1540   BP: (!) 163/66   Pulse: 62   Temp: 97.6 °F (36.4 °C)   Weight: 83.9 kg (185 lb)   Height: 5' (1.524 m)     Physical Exam  Vitals and nursing note reviewed.   Constitutional:       Appearance: She is well-developed.   Cardiovascular:      Pulses:           Dorsalis pedis pulses are 1+ on the right side and 1+ on the left side.        Posterior tibial pulses are 1+ on the right side and 1+ on the left side.   Pulmonary:      Effort: Pulmonary effort is normal.   Musculoskeletal:         General: Tenderness and deformity present.      Right foot: Decreased range of motion. Deformity present.   Feet:      Right foot:      Protective Sensation: 4 sites tested. 4 sites sensed.      Skin integrity: Erythema and warmth present.      Left foot:      Protective Sensation: 4 sites tested. 4 sites sensed.   Skin:     General: Skin is warm.      Capillary Refill: Capillary refill takes more than 3 seconds.      Findings: Erythema present.   Neurological:      Mental Status: She is alert.   Psychiatric:         Behavior: Behavior normal.          Thought Content: Thought content normal.         Judgment: Judgment normal.                           Assessment:       1. Gouty tophi    2. Idiopathic peripheral neuropathy        Plan:        Patient presents today for followup she has a history of gout her 4th digit right foot is very swollen there is obvious gouty tophi underlying the skin.  I did discuss at length and in detail with the patient keeping these well protected prevent anything that rubs or irritates the areas I have advised the patient other than surgically draining the gouty tophi from the digit and monitoring her diet there is nothing else that we can do at this point I have advised her the skin could break open and some of the toe tophi could drain from the area small amount of tophi was present on the 3rd digit left.  Surgical intervention consultation was provided today for the 4th digit right I have advised the patient I would only recommend this if this was bothering her on a consistent basis this would basically be an incision and drainage procedure it would be minor she would be able to bear weight right after surgery but certainly this is a surgical procedure and she needs to consider this carefully.  Patient was in understanding and agreement with this plan follow-up will be as needed patient advised to contact us with any problems questions or concerns should any of these areas of breakdown become further ulcerated or start to drain she will contact us immediately.  Patient had indicated that she had a significant flare up of the 4th digit right about 2 weeks ago she states it subsequently has gotten somewhat better it is still red still very swollen with underlying gouty tophi but she states it was actually quite worse.  I do recommend continuing to soak the area 20 minutes a day in warm water with Epson salt her table salt this will help with the discomfort and the inflammation the patient is having and certainly it should help to  make it feel better patient was in understanding and agreement with this she had several areas of ingrowing nail at risk for infection these were debrided today recommended follow-up will be as needed certainly if any of these areas break open her start to drain she is to contact us immediately.  This note was created using [employer] voice recognition software that occasionally misinterpreted phrases or words.

## 2022-03-31 ENCOUNTER — TELEPHONE (OUTPATIENT)
Dept: FAMILY MEDICINE | Facility: CLINIC | Age: 87
End: 2022-03-31
Payer: MEDICARE

## 2022-03-31 DIAGNOSIS — E55.9 VITAMIN D DEFICIENCY: ICD-10-CM

## 2022-03-31 DIAGNOSIS — F41.9 ANXIETY: Primary | ICD-10-CM

## 2022-03-31 RX ORDER — DIAZEPAM 5 MG/1
5 TABLET ORAL DAILY PRN
Qty: 30 TABLET | Refills: 0 | Status: SHIPPED | OUTPATIENT
Start: 2022-03-31 | End: 2022-05-26 | Stop reason: SDUPTHER

## 2022-03-31 RX ORDER — ERGOCALCIFEROL 1.25 MG/1
CAPSULE ORAL
Qty: 6 CAPSULE | Refills: 5 | Status: SHIPPED | OUTPATIENT
Start: 2022-03-31 | End: 2022-05-26 | Stop reason: SDUPTHER

## 2022-03-31 NOTE — TELEPHONE ENCOUNTER
----- Message from Sandee Rollins sent at 3/31/2022  1:09 PM CDT -----  Contact: pt  Type:  RX Refill Request    Who Called:  pt  Refill or New Rx:  refill  RX Name and Strength:  Vitamin D and Valium  How is the patient currently taking it? (ex. 1XDay):  As Directed  Is this a 30 day or 90 day RX:  30  Preferred Pharmacy with phone number:  [facility]  Local or Mail Order:  local  Ordering Provider:  Michelle Cali Call Back Number:  [phone]  Additional Information:      Please contact pt upon completion-Thank you~

## 2022-04-07 ENCOUNTER — OFFICE VISIT (OUTPATIENT)
Dept: CARDIOLOGY | Facility: CLINIC | Age: 87
End: 2022-04-07
Payer: MEDICARE

## 2022-04-07 VITALS
SYSTOLIC BLOOD PRESSURE: 165 MMHG | BODY MASS INDEX: 36.13 KG/M2 | HEART RATE: 62 BPM | HEIGHT: 60 IN | OXYGEN SATURATION: 98 % | DIASTOLIC BLOOD PRESSURE: 87 MMHG

## 2022-04-07 DIAGNOSIS — G89.4 CHRONIC PAIN SYNDROME: ICD-10-CM

## 2022-04-07 DIAGNOSIS — N18.31 STAGE 3A CHRONIC KIDNEY DISEASE: ICD-10-CM

## 2022-04-07 DIAGNOSIS — G47.19 EXCESSIVE DAYTIME SLEEPINESS: ICD-10-CM

## 2022-04-07 DIAGNOSIS — R42 DIZZINESS: ICD-10-CM

## 2022-04-07 DIAGNOSIS — D64.9 ANEMIA, UNSPECIFIED TYPE: ICD-10-CM

## 2022-04-07 DIAGNOSIS — Z95.5 STATUS POST CORONARY ARTERY STENT PLACEMENT: Primary | ICD-10-CM

## 2022-04-07 DIAGNOSIS — I35.8 AORTIC HEART MURMUR: ICD-10-CM

## 2022-04-07 DIAGNOSIS — R79.89 PRERENAL AZOTEMIA: ICD-10-CM

## 2022-04-07 DIAGNOSIS — I11.9 HYPERTENSIVE HEART DISEASE WITHOUT HEART FAILURE: ICD-10-CM

## 2022-04-07 DIAGNOSIS — E78.5 HYPERLIPIDEMIA, UNSPECIFIED HYPERLIPIDEMIA TYPE: ICD-10-CM

## 2022-04-07 DIAGNOSIS — R00.1 BRADYCARDIA: ICD-10-CM

## 2022-04-07 DIAGNOSIS — E66.01 CLASS 2 SEVERE OBESITY DUE TO EXCESS CALORIES WITH SERIOUS COMORBIDITY AND BODY MASS INDEX (BMI) OF 36.0 TO 36.9 IN ADULT: ICD-10-CM

## 2022-04-07 PROCEDURE — 1101F PT FALLS ASSESS-DOCD LE1/YR: CPT | Mod: CPTII,S$GLB,, | Performed by: INTERNAL MEDICINE

## 2022-04-07 PROCEDURE — 93000 EKG 12-LEAD: ICD-10-PCS | Mod: S$GLB,,, | Performed by: INTERNAL MEDICINE

## 2022-04-07 PROCEDURE — 99214 OFFICE O/P EST MOD 30 MIN: CPT | Mod: 25,S$GLB,, | Performed by: INTERNAL MEDICINE

## 2022-04-07 PROCEDURE — 1125F AMNT PAIN NOTED PAIN PRSNT: CPT | Mod: CPTII,S$GLB,, | Performed by: INTERNAL MEDICINE

## 2022-04-07 PROCEDURE — 3288F PR FALLS RISK ASSESSMENT DOCUMENTED: ICD-10-PCS | Mod: CPTII,S$GLB,, | Performed by: INTERNAL MEDICINE

## 2022-04-07 PROCEDURE — 99214 PR OFFICE/OUTPT VISIT, EST, LEVL IV, 30-39 MIN: ICD-10-PCS | Mod: 25,S$GLB,, | Performed by: INTERNAL MEDICINE

## 2022-04-07 PROCEDURE — 1101F PR PT FALLS ASSESS DOC 0-1 FALLS W/OUT INJ PAST YR: ICD-10-PCS | Mod: CPTII,S$GLB,, | Performed by: INTERNAL MEDICINE

## 2022-04-07 PROCEDURE — 99999 PR PBB SHADOW E&M-EST. PATIENT-LVL III: ICD-10-PCS | Mod: PBBFAC,,, | Performed by: INTERNAL MEDICINE

## 2022-04-07 PROCEDURE — 3288F FALL RISK ASSESSMENT DOCD: CPT | Mod: CPTII,S$GLB,, | Performed by: INTERNAL MEDICINE

## 2022-04-07 PROCEDURE — 99999 PR PBB SHADOW E&M-EST. PATIENT-LVL III: CPT | Mod: PBBFAC,,, | Performed by: INTERNAL MEDICINE

## 2022-04-07 PROCEDURE — 1125F PR PAIN SEVERITY QUANTIFIED, PAIN PRESENT: ICD-10-PCS | Mod: CPTII,S$GLB,, | Performed by: INTERNAL MEDICINE

## 2022-04-07 PROCEDURE — 93000 ELECTROCARDIOGRAM COMPLETE: CPT | Mod: S$GLB,,, | Performed by: INTERNAL MEDICINE

## 2022-04-07 NOTE — PROGRESS NOTES
Subjective:    Patient ID:  Yisel العلي is a 88 y.o. female who presents for evaluation of Follow-up  for atypical CP and chronic dizziness, refuse statin due to muscle pain, did not start Zetia, 6-months visit  Came on own  PCP: Dr. Martinez, no recent labs, see q 6 weeks  Gyn: Nikolai Simeon MD in [facility] cardiologist: Dr. Celeste, last seen in 3/2018  Pain: Madi Call MD  Physical M&R: Alix Rivera, DO  Lives alone, no pet, worry about tripping with poor balance for 5 years. Have moved to a big house in [facility], able to walk inside with walker   Daughter, Vandana, RN in [facility], do not have POA   Son, David, in [place],  do have POA, not medical  Retire teacher, [employer], play competitive bridge twice a week    SDOH: Difficult historian with memory difficulties. Here alone, daughter have to work  Health literacy: Medium  Vaccinations: unsure, need Shingle vaccine. Completed COVID and flu  Activities: ADL's only, no regular exercise, walking in the pool for an hour miss a lot due to rain and also no one there, no problem, limited by general OA.  Nicotine: Never  Alcohol: 1-2 glasses wine/week, max 2 per day, every 2 weeks.  Illicit drugs: Denies, on Rx Valium but use only once in 6 months.  Cardiac symptoms: None at present  Home BP: Yes - once a week, SBP most under 140  Medication compliance: No, was to start Zetia before return visit but did not start at all   Diet: regular, starting low calorie  Caffeine: 2-3 cups coffee/day  Labs: 11/08/2018 & 04/12/2019: No Troponin and TSH; .0 not on Rx, own preference; A1C 5.6%; Sodium 135L; K+ 4.5; GFR 58L; Glucose 99; WBC 6.9; Hemoglobin 13.2; High Sensitivity 0.30  No TSH, Troponin, BNP:  .2H not on Rx;  A1C 5.6;  Sodium 135L;  K+ 4.4;  Glucose 136H;  GFR 58.5l;  WBC 5.34;  Hemoglobin 13.6;    Lab Results   Component Value Date    TSH 1.750 06/10/2021        Lab Results   Component Value Date     LABA1C 6.1 (H) 01/23/2015    HGBA1C 5.6 01/07/2020       Lab Results   Component Value Date    WBC 6.62 04/22/2021    HGB 13.7 04/22/2021    HCT 41.6 04/22/2021    MCV 96 04/22/2021     04/22/2021       CMP  Sodium   Date Value Ref Range Status   06/10/2021 138 136 - 145 mmol/L Final     Potassium   Date Value Ref Range Status   06/10/2021 4.6 3.5 - 5.1 mmol/L Final     Chloride   Date Value Ref Range Status   06/10/2021 108 95 - 110 mmol/L Final     CO2   Date Value Ref Range Status   06/10/2021 20 (L) 23 - 29 mmol/L Final     Glucose   Date Value Ref Range Status   06/10/2021 112 (H) 70 - 110 mg/dL Final     BUN   Date Value Ref Range Status   06/10/2021 26 (H) 8 - 23 mg/dL Final     Creatinine   Date Value Ref Range Status   06/10/2021 0.9 0.5 - 1.4 mg/dL Final     Calcium   Date Value Ref Range Status   06/10/2021 10.4 8.7 - 10.5 mg/dL Final     Total Protein   Date Value Ref Range Status   06/10/2021 6.8 6.0 - 8.4 g/dL Final     Albumin   Date Value Ref Range Status   06/10/2021 3.9 3.5 - 5.2 g/dL Final     Total Bilirubin   Date Value Ref Range Status   06/10/2021 0.4 0.1 - 1.0 mg/dL Final     Comment:     For infants and newborns, interpretation of results should be based  on gestational age, weight and in agreement with clinical  observations.    Premature Infant recommended reference ranges:  Up to 24 hours.............<8.0 mg/dL  Up to 48 hours............<12.0 mg/dL  3-5 days..................<15.0 mg/dL  6-29 days.................<15.0 mg/dL       Alkaline Phosphatase   Date Value Ref Range Status   06/10/2021 57 55 - 135 U/L Final     AST   Date Value Ref Range Status   06/10/2021 15 10 - 40 U/L Final     ALT   Date Value Ref Range Status   06/10/2021 11 10 - 44 U/L Final     Anion Gap   Date Value Ref Range Status   06/10/2021 10 8 - 16 mmol/L Final     eGFR if    Date Value Ref Range Status   06/10/2021 >60.0 >60 mL/min/1.73 m^2 Final     eGFR if non    Date  "Value Ref Range Status   06/10/2021 57.7 (A) >60 mL/min/1.73 m^2 Final     Comment:     Calculation used to obtain the estimated glomerular filtration  rate (eGFR) is the CKD-EPI equation.        @labrcntip(troponini)@  No results found for: BNP}   Lab Results   Component Value Date    CHOL 233 (H) 10/06/2020    CHOL 199 2020    CHOL 223 (H) 2020     Lab Results   Component Value Date    HDL 52 10/06/2020    HDL 48 2020    HDL 47 2020     Lab Results   Component Value Date    LDLCALC 148.0 10/06/2020    LDLCALC 128.0 2020    LDLCALC 138.6 2020     Lab Results   Component Value Date    TRIG 165 (H) 10/06/2020    TRIG 115 2020    TRIG 187 (H) 2020     Lab Results   Component Value Date    CHOLHDL 22.3 10/06/2020    CHOLHDL 24.1 2020    CHOLHDL 21.1 2020      Last Echo: 2018, DSE  Last stress test: 2018  Cardiovascular angiogram: Can't remember   EC2021 SB, 53, possible prior septal MI  Fundoscopic exam: yearly, No retinopathy noted at present    In 2018:  Follow-up  Associated symptoms include congestion, coughing, joint swelling, myalgias, neck pain, numbness and vertigo. Pertinent negatives include no diaphoresis, fever, headaches, nausea or weakness.   Hyperlipidemia  Associated symptoms include myalgias. Pertinent negatives include no focal weakness.   Hypertension  Associated symptoms include malaise/fatigue and neck pain. Pertinent negatives include no headaches, orthopnea, palpitations or PND.   Coronary Artery Disease  Symptoms include dizziness, leg swelling, muscle weakness and weight gain (up 10 lbs over past 6 months.). Pertinent negatives include no palpitations. Risk factors include hyperlipidemia.     Dr. Celeste's note - "84 y.o. female who presents for Coronary Artery Disease (Labs); Hypertension; and Hyperlipidemia   DISCUSSED LABS AND GOALS, , NOT TAKING MEDS, , CR 1.06, NO CP OR OCC / SOB, NOT ACTIVE.   Recall " CAD with single stent placed maybe 15+ year ago. Do not recall any cardiac testing. ECG in sinus bradycardia with right axis deviation. Had smoked socially during college. 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 No recent repeat lipid panel. Report compliance with medications with restart of Pravastatin since visit in 3/2018. Not active now except for card playing, stopped walking in the poor for the last 3 weeks due to the water temperature. Was doing about an hour a day. Recall being on Ranexa for a number of years, only take one a day, do not see much benefit. Lot of worries about the not able to walk due to OA with CLBP with left sciatica, also concern of poor balance and falling. Willing to consider going to the gym.    In 9/2019, return for routine follow up. Still some concern about atypical CP for the past 12 years with some chest wall tenderness with hand pressure, also some recurrent dizziness also over the past 25 years. Worry about Carotid stenosis, post left side CEA.    DSE 11/2018 Left ventricle shows mild concentric hypertrophy.  Normal central venous pressure (3 mm Hg).  The estimated PA systolic pressure is 13.11 mm Hg  Left ventricle ejection fraction is increased (hyperdynamic) at 73%  Normal LV diastolic function.  RV systolic function is normal.  There were no arrhythmias during stress.  The EKG portion of this study is negative for myocardial ischemia.  The patient reported no symptoms during the stress test.  No Echo evidence for ischemia    In 3/2020, here for a rushed appointment have appointment [facility]. No change in cardiac status, more active with walking in the pool    In 10/2020, here alone for 6-months review. Difficult problem with clearly cognitive impairment. Some SOB but rare.    In 4/2021, returned by her self for follow up. Significant cognitive impairment as before. Been off oxybutyrin for over 2 months due to concern of cognitive problem. No heart  worries, continue with atypical precordial CP.     In 10/2021, return for 6-months review, still alone. No heart problem. Still did no started Zetia. Able to play bridge regularly and more active in the swimming pool. Would like to see doctor for OA.     HPI comments: in 4/2022, return for review, still lots of pains, now under the care of Madi Call MD. No heart issues.    Review of Systems   Constitutional: Positive for malaise/fatigue and weight gain (up 10 lbs over past 6 months.). Negative for diaphoresis, fever, night sweats and weight loss.        Refused weighing   HENT: Positive for congestion, hearing loss and stridor. Negative for nosebleeds and tinnitus.    Eyes: Positive for discharge. Negative for visual disturbance.   Cardiovascular: Positive for cyanosis and leg swelling. Negative for claudication, near-syncope, orthopnea, palpitations and paroxysmal nocturnal dyspnea.   Respiratory: Positive for cough, sleep disturbances due to breathing and sputum production. Negative for wheezing.         [place] score 11 up to a 15 today, awaken frequently for nocturia x 3. Refuse sleep study   Endocrine: Positive for cold intolerance and polyuria. Negative for polydipsia.   Hematologic/Lymphatic: Does not bruise/bleed easily.   Skin: Positive for dry skin. Negative for color change, flushing, nail changes, poor wound healing and suspicious lesions.   Musculoskeletal: Positive for arthritis, back pain, gout, joint pain, joint swelling, muscle cramps, muscle weakness, myalgias, neck pain and stiffness. Negative for falls.   Gastrointestinal: Positive for bloating. Negative for heartburn, hematemesis, hematochezia, melena and nausea.   Genitourinary: Positive for bladder incontinence, frequency, nocturia and urgency.   Neurological: Positive for disturbances in coordination, excessive daytime sleepiness, dizziness, light-headedness, loss of balance, numbness, paresthesias, sensory change and vertigo. Negative  "for focal weakness, headaches and weakness.   Psychiatric/Behavioral: Positive for memory loss. Negative for depression and substance abuse. The patient does not have insomnia and is not nervous/anxious.         Objective:    Physical Exam  Constitutional:       Appearance: She is well-developed.      Comments: RA O2 98%   HENT:      Head: Normocephalic.   Eyes:      Conjunctiva/sclera: Conjunctivae normal.      Pupils: Pupils are equal, round, and reactive to light.   Neck:      Thyroid: No thyromegaly.      Vascular: No JVD.      Comments: Circumference 14"  Cardiovascular:      Rate and Rhythm: Normal rate. Rhythm irregular.      Pulses: Intact distal pulses.           Carotid pulses are 2+ on the right side with bruit and 2+ on the left side.       Radial pulses are 2+ on the right side and 2+ on the left side.        Femoral pulses are 2+ on the right side and 2+ on the left side.       Popliteal pulses are 2+ on the right side and 2+ on the left side.        Dorsalis pedis pulses are 1+ on the right side and 1+ on the left side.        Posterior tibial pulses are 1+ on the right side and 1+ on the left side.      Heart sounds: Murmur heard.    Harsh midsystolic murmur is present with a grade of 2/6 at the upper right sternal border radiating to the neck.    No friction rub. No gallop.      Comments: Bilateral superficial varicose vein.  Pulmonary:      Effort: Pulmonary effort is normal.      Breath sounds: Normal breath sounds. No rales.   Chest:      Chest wall: No tenderness.   Abdominal:      General: Bowel sounds are normal.      Palpations: Abdomen is soft.      Tenderness: There is no abdominal tenderness.      Comments: Waist 46" down to 44.5", hip 45", refuse weight today   Musculoskeletal:         General: Normal range of motion.      Cervical back: Normal range of motion and neck supple.   Lymphadenopathy:      Cervical: No cervical adenopathy.   Skin:     General: Skin is warm and dry.      " Findings: No rash.      Comments: Pin to light touch of both LE.   Neurological:      Mental Status: She is alert and oriented to person, place, and time.           Assessment:       1. Status post coronary artery stent placement    2. Prerenal azotemia    3. Stage 3a chronic kidney disease    4. Chronic pain syndrome    5. Bradycardia    6. Excessive daytime sleepiness    7. Dizziness, onset 1985    8. Aortic heart murmur    9. Hypertensive heart disease without heart failure    10. Class 2 severe obesity due to excess calories with serious comorbidity and body mass index (BMI) of 36.0 to 36.9 in adult    11. Anemia, unspecified type    12. Hyperlipidemia, unspecified hyperlipidemia type         Plan:       Yisel was seen today for follow-up.    Diagnoses and all orders for this visit:    Status post coronary artery stent placement  -     IN OFFICE EKG 12-LEAD (to Muse)  -     Lipid Panel; Future    Prerenal azotemia  -     Comprehensive Metabolic Panel; Future    Stage 3a chronic kidney disease  -     Comprehensive Metabolic Panel; Future    Chronic pain syndrome    Bradycardia    Excessive daytime sleepiness    Dizziness, onset 1985    Aortic heart murmur  -     Echo    Hypertensive heart disease without heart failure  -     Echo    Class 2 severe obesity due to excess calories with serious comorbidity and body mass index (BMI) of 36.0 to 36.9 in adult    Anemia, unspecified type  -     CBC Auto Differential; Future    Hyperlipidemia, unspecified hyperlipidemia type  -     Lipid Panel; Future    - All medical issues reviewed, continue current Rx  - Warning signs of MI and stroke given, would chew 2-4 low-dose ASA (81 mg) if symptom last more than 5 minutes and call 911.  - Need to remain well hydrated but avoid drinking within 4 hours of bedtime.  - CV status stable, all medications reviewed, patient acknowledge good understanding.  - Instruction for Mediterranean diet and heart healthy exercise  given.  - Recommend healthy living: no nicotine, moderate alcohol, healthy diet and regular exercise aiming for fitness, restorative sleep and weight control  - Encourage activities as much as tolerated. Any activity is better than none!  - Check home blood pressure, 2 days weekly, do 2 readings within 5 minutes in AM and PM, keep log for review.  - Weigh twice weekly, try to lose 1-2 lbs per week  - Highly recommend 30-60 minutes of exercise daily, can have Sunday off, with 2-3 sessions of muscle strengthening weekly. A  would be very helpful.  - Highly recommend Yoga, Macho-Chi and or meditation  - Recommend at least biannual cardiovascular evaluation in view of her significant risk factors. Patient's preference  - Should have family member attend every doctor visit if at all possible  - Phone review / encourage use of [facility]        Patient Active Problem List   Diagnosis    Coronary artery disease of native artery of native heart with stable angina pectoris    Elevated fasting glucose    Hypercalcemia    Mixed hyperlipidemia, baseline LDL not available    HTN (hypertension), benign    Rhinitis    Tear of retina without detachment - Right Eye    EPIPHORA    Dry eyes    Pseudophakia - Both Eyes    Vitamin D deficiency    Anemia    Difficulty walking    Lower extremity pain    Back pain    Nephrolithiasis    Hypertensive heart disease without heart failure    Anxiety    Stress    Hyponatremia    Class 2 severe obesity due to excess calories with serious comorbidity and body mass index (BMI) of 36.0 to 36.9 in adult    Memory difficulties    Poor balance, onset 2014    Status post coronary artery stent placement    BMI 33.0-33.9,adult    Abdominal obesity    Sedentary lifestyle    Chronic bilateral low back pain with left-sided sciatica    Excessive daytime sleepiness    Hyperlipidemia    CKD (chronic kidney disease), stage III    Osteoarthritis of multiple joints     Mixed conductive and sensorineural hearing loss of both ears    Atypical chest pain, onset 2007    History of left-sided carotid endarterectomy    Dizziness, onset 1985    Bruit of right carotid artery    Gouty tophi    Statin intolerance    Generalized body aches, onset 2010    Cognitive dysfunction    Personal history of noncompliance with medical treatment, presenting hazards to health    Prerenal azotemia    Bradycardia    Chronic pain syndrome    Neuropathy    Burning sensation of lower extremity    Frailty    Idiopathic peripheral neuropathy    Aortic heart murmur     Greater than 50% was spent in counseling and coordination of care. The above assessment and plan have been discussed at length. Labs and procedure over the last 6 months reviewed. Problem List updated. Asked to bring in all active medications / pills bottles with next visit.

## 2022-04-08 ENCOUNTER — LAB VISIT (OUTPATIENT)
Dept: LAB | Facility: HOSPITAL | Age: 87
End: 2022-04-08
Attending: INTERNAL MEDICINE
Payer: MEDICARE

## 2022-04-08 DIAGNOSIS — Z95.5 STATUS POST CORONARY ARTERY STENT PLACEMENT: ICD-10-CM

## 2022-04-08 DIAGNOSIS — R79.89 PRERENAL AZOTEMIA: ICD-10-CM

## 2022-04-08 DIAGNOSIS — D64.9 ANEMIA, UNSPECIFIED TYPE: ICD-10-CM

## 2022-04-08 DIAGNOSIS — N18.31 STAGE 3A CHRONIC KIDNEY DISEASE: ICD-10-CM

## 2022-04-08 DIAGNOSIS — E78.5 HYPERLIPIDEMIA, UNSPECIFIED HYPERLIPIDEMIA TYPE: ICD-10-CM

## 2022-04-08 LAB
ALBUMIN SERPL BCP-MCNC: 4 G/DL (ref 3.5–5.2)
ALP SERPL-CCNC: 56 U/L (ref 55–135)
ALT SERPL W/O P-5'-P-CCNC: 17 U/L (ref 10–44)
ANION GAP SERPL CALC-SCNC: 9 MMOL/L (ref 8–16)
AST SERPL-CCNC: 18 U/L (ref 10–40)
BASOPHILS # BLD AUTO: 0.07 K/UL (ref 0–0.2)
BASOPHILS NFR BLD: 1.1 % (ref 0–1.9)
BILIRUB SERPL-MCNC: 0.6 MG/DL (ref 0.1–1)
BUN SERPL-MCNC: 14 MG/DL (ref 8–23)
CALCIUM SERPL-MCNC: 10.4 MG/DL (ref 8.7–10.5)
CHLORIDE SERPL-SCNC: 99 MMOL/L (ref 95–110)
CHOLEST SERPL-MCNC: 228 MG/DL (ref 120–199)
CHOLEST/HDLC SERPL: 4.3 {RATIO} (ref 2–5)
CO2 SERPL-SCNC: 25 MMOL/L (ref 23–29)
CREAT SERPL-MCNC: 0.8 MG/DL (ref 0.5–1.4)
DIFFERENTIAL METHOD: ABNORMAL
EOSINOPHIL # BLD AUTO: 0.1 K/UL (ref 0–0.5)
EOSINOPHIL NFR BLD: 1.9 % (ref 0–8)
ERYTHROCYTE [DISTWIDTH] IN BLOOD BY AUTOMATED COUNT: 12.1 % (ref 11.5–14.5)
EST. GFR  (AFRICAN AMERICAN): >60 ML/MIN/1.73 M^2
EST. GFR  (NON AFRICAN AMERICAN): >60 ML/MIN/1.73 M^2
GLUCOSE SERPL-MCNC: 106 MG/DL (ref 70–110)
HCT VFR BLD AUTO: 40.8 % (ref 37–48.5)
HDLC SERPL-MCNC: 53 MG/DL (ref 40–75)
HDLC SERPL: 23.2 % (ref 20–50)
HGB BLD-MCNC: 14.1 G/DL (ref 12–16)
IMM GRANULOCYTES # BLD AUTO: 0.01 K/UL (ref 0–0.04)
IMM GRANULOCYTES NFR BLD AUTO: 0.2 % (ref 0–0.5)
LDLC SERPL CALC-MCNC: 132.4 MG/DL (ref 63–159)
LYMPHOCYTES # BLD AUTO: 2.2 K/UL (ref 1–4.8)
LYMPHOCYTES NFR BLD: 35.9 % (ref 18–48)
MCH RBC QN AUTO: 32 PG (ref 27–31)
MCHC RBC AUTO-ENTMCNC: 34.6 G/DL (ref 32–36)
MCV RBC AUTO: 93 FL (ref 82–98)
MONOCYTES # BLD AUTO: 0.4 K/UL (ref 0.3–1)
MONOCYTES NFR BLD: 5.8 % (ref 4–15)
NEUTROPHILS # BLD AUTO: 3.4 K/UL (ref 1.8–7.7)
NEUTROPHILS NFR BLD: 55.1 % (ref 38–73)
NONHDLC SERPL-MCNC: 175 MG/DL
NRBC BLD-RTO: 0 /100 WBC
PLATELET # BLD AUTO: 306 K/UL (ref 150–450)
PMV BLD AUTO: 9.2 FL (ref 9.2–12.9)
POTASSIUM SERPL-SCNC: 4.3 MMOL/L (ref 3.5–5.1)
PROT SERPL-MCNC: 6.9 G/DL (ref 6–8.4)
RBC # BLD AUTO: 4.41 M/UL (ref 4–5.4)
SODIUM SERPL-SCNC: 133 MMOL/L (ref 136–145)
TRIGL SERPL-MCNC: 213 MG/DL (ref 30–150)
WBC # BLD AUTO: 6.24 K/UL (ref 3.9–12.7)

## 2022-04-08 PROCEDURE — 80053 COMPREHEN METABOLIC PANEL: CPT | Performed by: INTERNAL MEDICINE

## 2022-04-08 PROCEDURE — 36415 COLL VENOUS BLD VENIPUNCTURE: CPT | Performed by: INTERNAL MEDICINE

## 2022-04-08 PROCEDURE — 80061 LIPID PANEL: CPT | Performed by: INTERNAL MEDICINE

## 2022-04-08 PROCEDURE — 85025 COMPLETE CBC W/AUTO DIFF WBC: CPT | Performed by: INTERNAL MEDICINE

## 2022-04-14 ENCOUNTER — OFFICE VISIT (OUTPATIENT)
Dept: PAIN MEDICINE | Facility: CLINIC | Age: 87
End: 2022-04-14
Payer: MEDICARE

## 2022-04-14 VITALS
BODY MASS INDEX: 36.32 KG/M2 | HEART RATE: 62 BPM | DIASTOLIC BLOOD PRESSURE: 69 MMHG | SYSTOLIC BLOOD PRESSURE: 154 MMHG | WEIGHT: 185 LBS | HEIGHT: 60 IN

## 2022-04-14 DIAGNOSIS — M47.896 OTHER SPONDYLOSIS, LUMBAR REGION: ICD-10-CM

## 2022-04-14 DIAGNOSIS — M54.16 LUMBAR RADICULOPATHY: Primary | ICD-10-CM

## 2022-04-14 DIAGNOSIS — M51.36 DDD (DEGENERATIVE DISC DISEASE), LUMBAR: ICD-10-CM

## 2022-04-14 PROCEDURE — 1101F PR PT FALLS ASSESS DOC 0-1 FALLS W/OUT INJ PAST YR: ICD-10-PCS | Mod: CPTII,S$GLB,, | Performed by: ANESTHESIOLOGY

## 2022-04-14 PROCEDURE — 1125F AMNT PAIN NOTED PAIN PRSNT: CPT | Mod: CPTII,S$GLB,, | Performed by: ANESTHESIOLOGY

## 2022-04-14 PROCEDURE — 3288F FALL RISK ASSESSMENT DOCD: CPT | Mod: CPTII,S$GLB,, | Performed by: ANESTHESIOLOGY

## 2022-04-14 PROCEDURE — 3288F PR FALLS RISK ASSESSMENT DOCUMENTED: ICD-10-PCS | Mod: CPTII,S$GLB,, | Performed by: ANESTHESIOLOGY

## 2022-04-14 PROCEDURE — 99214 PR OFFICE/OUTPT VISIT, EST, LEVL IV, 30-39 MIN: ICD-10-PCS | Mod: S$GLB,,, | Performed by: ANESTHESIOLOGY

## 2022-04-14 PROCEDURE — 1125F PR PAIN SEVERITY QUANTIFIED, PAIN PRESENT: ICD-10-PCS | Mod: CPTII,S$GLB,, | Performed by: ANESTHESIOLOGY

## 2022-04-14 PROCEDURE — 99214 OFFICE O/P EST MOD 30 MIN: CPT | Mod: S$GLB,,, | Performed by: ANESTHESIOLOGY

## 2022-04-14 PROCEDURE — 99999 PR PBB SHADOW E&M-EST. PATIENT-LVL II: ICD-10-PCS | Mod: PBBFAC,,, | Performed by: ANESTHESIOLOGY

## 2022-04-14 PROCEDURE — 99999 PR PBB SHADOW E&M-EST. PATIENT-LVL II: CPT | Mod: PBBFAC,,, | Performed by: ANESTHESIOLOGY

## 2022-04-14 PROCEDURE — 1101F PT FALLS ASSESS-DOCD LE1/YR: CPT | Mod: CPTII,S$GLB,, | Performed by: ANESTHESIOLOGY

## 2022-04-14 NOTE — PROGRESS NOTES
FOLLOW UP NOTE:     CHIEF COMPLAINT: back and leg pain    INITIAL HISTORY OF PRESENT ILLNESS: Yisel العلي is a 88 y.o. female with PMH significant for CAD s/p PCI, HTN, CKD, and hx of right knee arthroplasty presents for the evaluation of back pain. The patient reports that her pain began approximately 5-10 years ago after no inciting incident or trauma. The patient reports that her pain has worsened over the past two years. The patient localizes her pain to the area across her lower back. The patient reports of radiation into her hips. The patient describes her pain as an aching type of pain. The patient reports of intermittent numbness in her feet and numbness in her hands. The patient reports that her pain is a 6/10. Patient denies of any fecal incontinence, saddle anesthesia, or weakness.      Of note, the patient is previously saw an Ortho in [place] but she cannot recall the provider's name. She reports that she is not currently seeing an Ortho specialist.      Aggravating factors: walking, housework, prolonged standing     Mitigating factors: sitting, rest    INTERVAL HISTORY OF PRESENT ILLNESS: Yisel العلي is a 88 y.o. female with PMH significant for CAD s/p PCI, HTN, CKD, and hx of right knee arthroplasty presents an established patient for the continued management of back pain. Today, the patient reports that the pain in her legs is currently the worst of her current pains. She localizes her pain to the entirety of her legs with radiation from the lower back. She reports that her function is severely limited secondary to her pain. She reports of weight gain secondary to worsening function. She denies of any obvious alleviating factors. The patient denies of any significant changes in her health since her last appointment. The patient also denies of any changes in the character of her pain since her last appointment. The patient reports that her current pain is a 5/10. Patient denies of any  urinary/fecal incontinence, saddle anesthesia, or weakness.     INTERVENTIONAL PAIN HISTORY: N/A    CURRENT PAIN MEDICATIONS: N/A      ROS:  Review of Systems   Constitutional: Negative for chills and fever.   HENT: Negative for sore throat.    Eyes: Negative for visual disturbance.   Respiratory: Negative for shortness of breath.    Cardiovascular: Negative for chest pain.   Gastrointestinal: Negative for nausea and vomiting.   Genitourinary: Negative for difficulty urinating.   Musculoskeletal: Positive for arthralgias and back pain.   Skin: Negative for rash.   Allergic/Immunologic: Negative for immunocompromised state.   Neurological: Negative for syncope.   Hematological: Does not bruise/bleed easily.   Psychiatric/Behavioral: Negative for suicidal ideas.        MEDICAL, SURGICAL, FAMILY, SOCIAL HX: reviewed    MEDICATIONS/ALLERGIES: reviewed    PHYSICAL EXAM:    VITALS: Vitals reviewed.   Vitals:    04/14/22 1406   BP: (!) 154/69   Pulse: 62   Weight: 83.9 kg (185 lb)   Height: 5' (1.524 m)   PainSc:   5       Physical Exam  Vitals and nursing note reviewed.   Constitutional:       Appearance: She is not diaphoretic.   HENT:      Head: Normocephalic and atraumatic.   Eyes:      General:         Right eye: No discharge.         Left eye: No discharge.      Conjunctiva/sclera: Conjunctivae normal.   Cardiovascular:      Rate and Rhythm: Normal rate.   Pulmonary:      Effort: Pulmonary effort is normal. No respiratory distress.      Breath sounds: Normal breath sounds.   Abdominal:      Palpations: Abdomen is soft.   Skin:     General: Skin is warm and dry.      Findings: No rash.   Neurological:      Mental Status: She is alert and oriented to person, place, and time.   Psychiatric:         Mood and Affect: Mood and affect normal.         Cognition and Memory: Memory normal.         Judgment: Judgment normal.        UPPER EXTREMITIES: Normal alignment, normal range of motion, no atrophy, no skin changes,  hair  growth and nail growth normal and equal bilaterally. No swelling, no tenderness.    LOWER EXTREMITIES:  Normal alignment, normal range of motion, no atrophy, no skin changes,  hair growth and nail growth normal and equal bilaterally. No swelling, no tenderness.     LUMBAR SPINE  Lumbar spine: ROM is full with flexion but limited with extension and oblique extension with increased pain with extension     ((--)) Supine straight leg raise    ((+)) Facet loading   Internal and external rotation of the hip causes no increased pain on either side.  Myofascial exam: No tenderness to palpation across lumbar paraspinous muscles.    MOTOR: Tone and bulk: normal bilateral upper and lower Strength: normal   Delt      Bi         Tri        WE      WF                        R          5          5          5          5          5          5            L          5          5          5          5          5          5               IP         ADD     ABD     Quad   TA        Gas      HAM  R          5          5          5          5          5          5          5  L          5          5          5          5          5          5          5     SENSATION: Light touch and pinprick intact bilaterally  REFLEXES: normal, symmetric, nonbrisk.  Toes down, no clonus. Negative wang's sign bilaterally.  GAIT: antalgic gait; uses a single point cane for assistance with ambulation    IMAGING: CT left knee without contrast (2/28/2022):  There is a small joint effusion.  There is popliteal cyst extending cranial caudally for approximately 6 cm.  There are vascular calcifications.  There is tricompartmental degenerative change.  Calcification in medial and lateral compartments suggesting CPPD.  Narrowing of medial and lateral compartments of the knees more so medial compartment which is severely narrowed with lateral compartment appearing mildly to mildly narrowed.  Despite 2 sets of images there is some image degradation from  artifact.  With artifact appearing primarily from metal artifact in this patient with history of right knee replacement.  No acute fracture is visualized.     Impression:     Image degradation from artifact but as visualized no acute fractures seen.  There are severe degenerative change along with medial and lateral compartment joint space narrowing, joint effusion, popliteal cyst.    ASSESSMENT: Yisel العلي is a 88 y.o. female with PMH significant for CAD s/p PCI, HTN, CKD, and hx of right knee arthroplasty presents an established patient for the continued management of back pain. Today, the patient reports that the pain in her legs is currently the worst of her current pains. She localizes her pain to the entirety of her legs with radiation from the lower back. I suspect her current pain has contributions from DDD and lumbar radiculopathy. Treatment plan outlined below.     PLAN:  1. Schedule for L4-L5 lumbar interlaminar epidural steroid injection  2. Avoid NSAIDS given renal and cardiac co-morbidities.  3. I have stressed the importance of physical activity and a home exercise plan to help with chronic pain and improve health.  4. RTC for the procedure as outlined above    Madi Call MD  Pain Management

## 2022-04-14 NOTE — H&P (VIEW-ONLY)
FOLLOW UP NOTE:     CHIEF COMPLAINT: back and leg pain    INITIAL HISTORY OF PRESENT ILLNESS: Yisel العلي is a 88 y.o. female with PMH significant for CAD s/p PCI, HTN, CKD, and hx of right knee arthroplasty presents for the evaluation of back pain. The patient reports that her pain began approximately 5-10 years ago after no inciting incident or trauma. The patient reports that her pain has worsened over the past two years. The patient localizes her pain to the area across her lower back. The patient reports of radiation into her hips. The patient describes her pain as an aching type of pain. The patient reports of intermittent numbness in her feet and numbness in her hands. The patient reports that her pain is a 6/10. Patient denies of any fecal incontinence, saddle anesthesia, or weakness.      Of note, the patient is previously saw an Ortho in [place] but she cannot recall the provider's name. She reports that she is not currently seeing an Ortho specialist.      Aggravating factors: walking, housework, prolonged standing     Mitigating factors: sitting, rest    INTERVAL HISTORY OF PRESENT ILLNESS: Yisel العلي is a 88 y.o. female with PMH significant for CAD s/p PCI, HTN, CKD, and hx of right knee arthroplasty presents an established patient for the continued management of back pain. Today, the patient reports that the pain in her legs is currently the worst of her current pains. She localizes her pain to the entirety of her legs with radiation from the lower back. She reports that her function is severely limited secondary to her pain. She reports of weight gain secondary to worsening function. She denies of any obvious alleviating factors. The patient denies of any significant changes in her health since her last appointment. The patient also denies of any changes in the character of her pain since her last appointment. The patient reports that her current pain is a 5/10. Patient denies of any  urinary/fecal incontinence, saddle anesthesia, or weakness.     INTERVENTIONAL PAIN HISTORY: N/A    CURRENT PAIN MEDICATIONS: N/A      ROS:  Review of Systems   Constitutional: Negative for chills and fever.   HENT: Negative for sore throat.    Eyes: Negative for visual disturbance.   Respiratory: Negative for shortness of breath.    Cardiovascular: Negative for chest pain.   Gastrointestinal: Negative for nausea and vomiting.   Genitourinary: Negative for difficulty urinating.   Musculoskeletal: Positive for arthralgias and back pain.   Skin: Negative for rash.   Allergic/Immunologic: Negative for immunocompromised state.   Neurological: Negative for syncope.   Hematological: Does not bruise/bleed easily.   Psychiatric/Behavioral: Negative for suicidal ideas.        MEDICAL, SURGICAL, FAMILY, SOCIAL HX: reviewed    MEDICATIONS/ALLERGIES: reviewed    PHYSICAL EXAM:    VITALS: Vitals reviewed.   Vitals:    04/14/22 1406   BP: (!) 154/69   Pulse: 62   Weight: 83.9 kg (185 lb)   Height: 5' (1.524 m)   PainSc:   5       Physical Exam  Vitals and nursing note reviewed.   Constitutional:       Appearance: She is not diaphoretic.   HENT:      Head: Normocephalic and atraumatic.   Eyes:      General:         Right eye: No discharge.         Left eye: No discharge.      Conjunctiva/sclera: Conjunctivae normal.   Cardiovascular:      Rate and Rhythm: Normal rate.   Pulmonary:      Effort: Pulmonary effort is normal. No respiratory distress.      Breath sounds: Normal breath sounds.   Abdominal:      Palpations: Abdomen is soft.   Skin:     General: Skin is warm and dry.      Findings: No rash.   Neurological:      Mental Status: She is alert and oriented to person, place, and time.   Psychiatric:         Mood and Affect: Mood and affect normal.         Cognition and Memory: Memory normal.         Judgment: Judgment normal.        UPPER EXTREMITIES: Normal alignment, normal range of motion, no atrophy, no skin changes,  hair  growth and nail growth normal and equal bilaterally. No swelling, no tenderness.    LOWER EXTREMITIES:  Normal alignment, normal range of motion, no atrophy, no skin changes,  hair growth and nail growth normal and equal bilaterally. No swelling, no tenderness.     LUMBAR SPINE  Lumbar spine: ROM is full with flexion but limited with extension and oblique extension with increased pain with extension     ((--)) Supine straight leg raise    ((+)) Facet loading   Internal and external rotation of the hip causes no increased pain on either side.  Myofascial exam: No tenderness to palpation across lumbar paraspinous muscles.    MOTOR: Tone and bulk: normal bilateral upper and lower Strength: normal   Delt      Bi         Tri        WE      WF                        R          5          5          5          5          5          5            L          5          5          5          5          5          5               IP         ADD     ABD     Quad   TA        Gas      HAM  R          5          5          5          5          5          5          5  L          5          5          5          5          5          5          5     SENSATION: Light touch and pinprick intact bilaterally  REFLEXES: normal, symmetric, nonbrisk.  Toes down, no clonus. Negative wang's sign bilaterally.  GAIT: antalgic gait; uses a single point cane for assistance with ambulation    IMAGING: CT left knee without contrast (2/28/2022):  There is a small joint effusion.  There is popliteal cyst extending cranial caudally for approximately 6 cm.  There are vascular calcifications.  There is tricompartmental degenerative change.  Calcification in medial and lateral compartments suggesting CPPD.  Narrowing of medial and lateral compartments of the knees more so medial compartment which is severely narrowed with lateral compartment appearing mildly to mildly narrowed.  Despite 2 sets of images there is some image degradation from  artifact.  With artifact appearing primarily from metal artifact in this patient with history of right knee replacement.  No acute fracture is visualized.     Impression:     Image degradation from artifact but as visualized no acute fractures seen.  There are severe degenerative change along with medial and lateral compartment joint space narrowing, joint effusion, popliteal cyst.    ASSESSMENT: Yisel العلي is a 88 y.o. female with PMH significant for CAD s/p PCI, HTN, CKD, and hx of right knee arthroplasty presents an established patient for the continued management of back pain. Today, the patient reports that the pain in her legs is currently the worst of her current pains. She localizes her pain to the entirety of her legs with radiation from the lower back. I suspect her current pain has contributions from DDD and lumbar radiculopathy. Treatment plan outlined below.     PLAN:  1. Schedule for L4-L5 lumbar interlaminar epidural steroid injection  2. Avoid NSAIDS given renal and cardiac co-morbidities.  3. I have stressed the importance of physical activity and a home exercise plan to help with chronic pain and improve health.  4. RTC for the procedure as outlined above    Madi Call MD  Pain Management

## 2022-04-25 ENCOUNTER — HOSPITAL ENCOUNTER (OUTPATIENT)
Dept: CARDIOLOGY | Facility: HOSPITAL | Age: 87
Discharge: HOME | End: 2022-04-25
Attending: INTERNAL MEDICINE
Payer: MEDICARE

## 2022-04-25 VITALS — BODY MASS INDEX: 36.32 KG/M2 | WEIGHT: 185 LBS | HEIGHT: 60 IN

## 2022-04-25 LAB
AORTIC ROOT ANNULUS: 3.08 CM
AORTIC VALVE CUSP SEPERATION: 1.1 CM
AV INDEX (PROSTH): 0.64
AV MEAN GRADIENT: 9 MMHG
AV PEAK GRADIENT: 15 MMHG
AV VALVE AREA: 2.08 CM2
AV VELOCITY RATIO: 0.53
BSA FOR ECHO PROCEDURE: 1.88 M2
CV ECHO LV RWT: 0.81 CM
DOP CALC AO PEAK VEL: 1.92 M/S
DOP CALC AO VTI: 39.75 CM
DOP CALC LVOT AREA: 3.2 CM2
DOP CALC LVOT DIAMETER: 2.03 CM
DOP CALC LVOT PEAK VEL: 1.01 M/S
DOP CALC LVOT STROKE VOLUME: 82.78 CM3
DOP CALC MV VTI: 35.15 CM
DOP CALCLVOT PEAK VEL VTI: 25.59 CM
E WAVE DECELERATION TIME: 402.69 MSEC
E/A RATIO: 0.66
E/E' RATIO: 14.8 M/S
ECHO LV POSTERIOR WALL: 1.27 CM (ref 0.6–1.1)
EJECTION FRACTION: 64 %
FRACTIONAL SHORTENING: 33 % (ref 28–44)
INTERVENTRICULAR SEPTUM: 1.29 CM (ref 0.6–1.1)
IVRT: 72.31 MSEC
LA MAJOR: 4.07 CM
LA MINOR: 2.18 CM
LEFT ATRIUM SIZE: 3.6 CM
LEFT ATRIUM VOLUME INDEX MOD: 4.2 ML/M2
LEFT ATRIUM VOLUME MOD: 7.69 CM3
LEFT INTERNAL DIMENSION IN SYSTOLE: 2.08 CM (ref 2.1–4)
LEFT VENTRICLE DIASTOLIC VOLUME INDEX: 21.3 ML/M2
LEFT VENTRICLE DIASTOLIC VOLUME: 38.55 ML
LEFT VENTRICLE MASS INDEX: 71 G/M2
LEFT VENTRICLE SYSTOLIC VOLUME INDEX: 7.8 ML/M2
LEFT VENTRICLE SYSTOLIC VOLUME: 14.03 ML
LEFT VENTRICULAR INTERNAL DIMENSION IN DIASTOLE: 3.12 CM (ref 3.5–6)
LEFT VENTRICULAR MASS: 127.8 G
LV LATERAL E/E' RATIO: 18.5 M/S
LV SEPTAL E/E' RATIO: 12.33 M/S
MV MEAN GRADIENT: 1 MMHG
MV PEAK A VEL: 1.12 M/S
MV PEAK E VEL: 0.74 M/S
MV PEAK GRADIENT: 5 MMHG
MV STENOSIS PRESSURE HALF TIME: 94.6 MS
MV VALVE AREA BY CONTINUITY EQUATION: 2.36 CM2
MV VALVE AREA P 1/2 METHOD: 2.33 CM2
PISA MRMAX VEL: 0.02 M/S
PISA TR MAX VEL: 1.54 M/S
PV MEAN GRADIENT: 2 MMHG
PV PEAK VELOCITY: 1.06 CM/S
RA MAJOR: 4.04 CM
RA PRESSURE: 3 MMHG
RA WIDTH: 3.1 CM
RIGHT VENTRICULAR END-DIASTOLIC DIMENSION: 2.38 CM
TDI LATERAL: 0.04 M/S
TDI SEPTAL: 0.06 M/S
TDI: 0.05 M/S
TR MAX PG: 9 MMHG
TRICUSPID ANNULAR PLANE SYSTOLIC EXCURSION: 1.8 CM
TV REST PULMONARY ARTERY PRESSURE: 12 MMHG

## 2022-04-25 PROCEDURE — 93306 TTE W/DOPPLER COMPLETE: CPT | Mod: 26,,, | Performed by: INTERNAL MEDICINE

## 2022-04-25 PROCEDURE — 93356 MYOCRD STRAIN IMG SPCKL TRCK: CPT | Mod: ,,, | Performed by: INTERNAL MEDICINE

## 2022-04-25 PROCEDURE — 93356 ECHO (CUPID ONLY): ICD-10-PCS | Mod: ,,, | Performed by: INTERNAL MEDICINE

## 2022-04-25 PROCEDURE — 93306 ECHO (CUPID ONLY): ICD-10-PCS | Mod: 26,,, | Performed by: INTERNAL MEDICINE

## 2022-04-25 PROCEDURE — 93356 MYOCRD STRAIN IMG SPCKL TRCK: CPT

## 2022-05-03 RX ORDER — SODIUM CHLORIDE, SODIUM LACTATE, POTASSIUM CHLORIDE, CALCIUM CHLORIDE 600; 310; 30; 20 MG/100ML; MG/100ML; MG/100ML; MG/100ML
INJECTION, SOLUTION INTRAVENOUS ONCE AS NEEDED
Status: CANCELLED | OUTPATIENT
Start: 2022-05-03 | End: 2033-09-29

## 2022-05-04 ENCOUNTER — HOSPITAL ENCOUNTER (OUTPATIENT)
Facility: HOSPITAL | Age: 87
Discharge: HOME | End: 2022-05-04
Attending: ANESTHESIOLOGY | Admitting: ANESTHESIOLOGY
Payer: MEDICARE

## 2022-05-04 VITALS
HEIGHT: 64 IN | RESPIRATION RATE: 16 BRPM | HEART RATE: 57 BPM | DIASTOLIC BLOOD PRESSURE: 86 MMHG | BODY MASS INDEX: 32.95 KG/M2 | OXYGEN SATURATION: 97 % | TEMPERATURE: 98 F | WEIGHT: 193 LBS | SYSTOLIC BLOOD PRESSURE: 167 MMHG

## 2022-05-04 DIAGNOSIS — M54.42 CHRONIC BILATERAL LOW BACK PAIN WITH LEFT-SIDED SCIATICA: Primary | ICD-10-CM

## 2022-05-04 DIAGNOSIS — G89.29 CHRONIC BILATERAL LOW BACK PAIN WITH LEFT-SIDED SCIATICA: Primary | ICD-10-CM

## 2022-05-04 DIAGNOSIS — M51.36 DEGENERATIVE DISC DISEASE, LUMBAR: ICD-10-CM

## 2022-05-04 PROCEDURE — 62323 NJX INTERLAMINAR LMBR/SAC: CPT | Performed by: ANESTHESIOLOGY

## 2022-05-04 PROCEDURE — 62323 PR INJ LUMBAR/SACRAL, W/IMAGING GUIDANCE: ICD-10-PCS | Mod: ,,, | Performed by: ANESTHESIOLOGY

## 2022-05-04 PROCEDURE — 62323 NJX INTERLAMINAR LMBR/SAC: CPT | Mod: ,,, | Performed by: ANESTHESIOLOGY

## 2022-05-04 PROCEDURE — 25500020 PHARM REV CODE 255: Performed by: ANESTHESIOLOGY

## 2022-05-04 PROCEDURE — 63600175 PHARM REV CODE 636 W HCPCS: Performed by: ANESTHESIOLOGY

## 2022-05-04 PROCEDURE — 25000003 PHARM REV CODE 250: Performed by: ANESTHESIOLOGY

## 2022-05-04 RX ORDER — ALPRAZOLAM 0.5 MG/1
0.5 TABLET, ORALLY DISINTEGRATING ORAL ONCE AS NEEDED
Status: COMPLETED | OUTPATIENT
Start: 2022-05-04 | End: 2022-05-04

## 2022-05-04 RX ORDER — LIDOCAINE HYDROCHLORIDE 10 MG/ML
INJECTION INFILTRATION; PERINEURAL
Status: DISCONTINUED | OUTPATIENT
Start: 2022-05-04 | End: 2022-05-04 | Stop reason: HOSPADM

## 2022-05-04 RX ORDER — METHYLPREDNISOLONE ACETATE 80 MG/ML
INJECTION, SUSPENSION INTRA-ARTICULAR; INTRALESIONAL; INTRAMUSCULAR; SOFT TISSUE
Status: DISCONTINUED | OUTPATIENT
Start: 2022-05-04 | End: 2022-05-04 | Stop reason: HOSPADM

## 2022-05-04 RX ORDER — ALLOPURINOL 100 MG/1
100 TABLET ORAL DAILY
COMMUNITY
End: 2022-05-26 | Stop reason: SDUPTHER

## 2022-05-04 RX ADMIN — ALPRAZOLAM 0.5 MG: 0.5 TABLET, ORALLY DISINTEGRATING ORAL at 07:05

## 2022-05-04 NOTE — OP NOTE
PROCEDURE DATE: 5/4/2022    PROCEDURE:  Lumbar interlaminar epidural steroid injection at L4-L5 under fluoroscopic guidance.    DIAGNOSIS: Lumbar radiculopathy  POSTOP DIAGNOSIS: SAME    PHYSICIAN: Madi Call M.D.    MEDICATIONS INJECTED: 80 mg depo-medrol with 4 ml of preservative free NaCl    LOCAL ANESTHETIC INJECTED:    Lidocaine 1% 4 ml total    SEDATION MEDICATIONS: oral sedation    ESTIMATED BLOOD LOSS:  none    COMPLICATIONS:  none    TECHNIQUE:  Time-out taken to identify patient and procedure prior to starting the procedure.  With the patient laying in a prone position, the area was prepped and draped in the usual sterile fashion using ChloraPrep and a fenestrated drape.  After determining the target level with an AP fluoroscopic view, local anesthetic was given using a 25-gauge 1.5 inch needle by raising a wheal and then infiltrating toward the interlaminar entry space.  A 3.5 inch 18-gauge Touhy needle was introduced under AP fluoroscopic guidance to the interlaminar space of L4-L5. Once the trajectory was established, the needle was visualized in the lateral view and advanced using loss of resistance technique. Once in the desired position, 2 mL contrast was injected to confirm placement and there was no vascular uptake nor intrathecal spread.  The medication was then injected slowly. The patient tolerated the procedure well.      The patient was monitored after the procedure.   They were given post-procedure and discharge instructions to follow at home.  The patient was discharged in a stable condition.

## 2022-05-04 NOTE — DISCHARGE SUMMARY
[facility] Surgery  Discharge Note  Short Stay    Procedure(s) (LRB):  Injection, Steroid, Epidural L4-L5 (N/A)    OUTCOME: Patient tolerated treatment/procedure well without complication and is now ready for discharge.    DISPOSITION: Home or Self Care    FINAL DIAGNOSIS:  Degenerative disc disease, lumbar    FOLLOWUP: In clinic    DISCHARGE INSTRUCTIONS:    Discharge Procedure Orders   Diet general     Call MD for:  temperature >100.4     Call MD for:  persistent nausea and vomiting     Call MD for:  severe uncontrolled pain     Call MD for:  difficulty breathing, headache or visual disturbances     Call MD for:  redness, tenderness, or signs of infection (pain, swelling, redness, odor or green/yellow discharge around incision site)     Call MD for:  hives     Call MD for:  persistent dizziness or light-headedness     Call MD for:  extreme fatigue        TIME SPENT ON DISCHARGE: 30 minutes

## 2022-05-04 NOTE — DISCHARGE INSTRUCTIONS
[phone]  [facility]   [phone]  [facility]      [phone]    Managing nausea    Some people have an upset stomach after surgery. This is often because of anesthesia, pain, or pain medicine, or the stress of surgery. These tips will help you handle nausea and eat healthy foods as you get better. If you were on a special food plan before surgery, ask your healthcare provider if you should follow it while you get better. These tips may help:  Do not push yourself to eat. Your body will tell you when to eat and how much.  Start off with clear liquids and soup. They are easier to digest.  Next try semi-solid foods, such as mashed potatoes, applesauce, and gelatin, as you feel ready.  Slowly move to solid foods. Dont eat fatty, rich, or spicy foods at first.  Do not force yourself to have 3 large meals a day. Instead eat smaller amounts more often.  Take pain medicines with a small amount of solid food, such as crackers or toast, to avoid nausea.

## 2022-05-26 ENCOUNTER — OFFICE VISIT (OUTPATIENT)
Dept: FAMILY MEDICINE | Facility: CLINIC | Age: 87
End: 2022-05-26
Payer: MEDICARE

## 2022-05-26 VITALS
BODY MASS INDEX: 32.95 KG/M2 | SYSTOLIC BLOOD PRESSURE: 136 MMHG | HEART RATE: 78 BPM | DIASTOLIC BLOOD PRESSURE: 52 MMHG | HEIGHT: 64 IN | WEIGHT: 193 LBS | OXYGEN SATURATION: 96 %

## 2022-05-26 DIAGNOSIS — E55.9 VITAMIN D DEFICIENCY: ICD-10-CM

## 2022-05-26 DIAGNOSIS — R20.0 NUMBNESS AND TINGLING OF BOTH UPPER EXTREMITIES: ICD-10-CM

## 2022-05-26 DIAGNOSIS — I25.118 CORONARY ARTERY DISEASE OF NATIVE ARTERY OF NATIVE HEART WITH STABLE ANGINA PECTORIS: ICD-10-CM

## 2022-05-26 DIAGNOSIS — F41.9 ANXIETY: ICD-10-CM

## 2022-05-26 DIAGNOSIS — R42 DIZZINESS: ICD-10-CM

## 2022-05-26 DIAGNOSIS — R20.2 NUMBNESS AND TINGLING OF BOTH UPPER EXTREMITIES: ICD-10-CM

## 2022-05-26 DIAGNOSIS — M10.9 GOUT, UNSPECIFIED CAUSE, UNSPECIFIED CHRONICITY, UNSPECIFIED SITE: Primary | ICD-10-CM

## 2022-05-26 PROCEDURE — 1159F PR MEDICATION LIST DOCUMENTED IN MEDICAL RECORD: ICD-10-PCS | Mod: CPTII,S$GLB,, | Performed by: FAMILY MEDICINE

## 2022-05-26 PROCEDURE — 1101F PR PT FALLS ASSESS DOC 0-1 FALLS W/OUT INJ PAST YR: ICD-10-PCS | Mod: CPTII,S$GLB,, | Performed by: FAMILY MEDICINE

## 2022-05-26 PROCEDURE — 99999 PR PBB SHADOW E&M-EST. PATIENT-LVL III: ICD-10-PCS | Mod: PBBFAC,,, | Performed by: FAMILY MEDICINE

## 2022-05-26 PROCEDURE — 1101F PT FALLS ASSESS-DOCD LE1/YR: CPT | Mod: CPTII,S$GLB,, | Performed by: FAMILY MEDICINE

## 2022-05-26 PROCEDURE — 99214 PR OFFICE/OUTPT VISIT, EST, LEVL IV, 30-39 MIN: ICD-10-PCS | Mod: S$GLB,,, | Performed by: FAMILY MEDICINE

## 2022-05-26 PROCEDURE — 1159F MED LIST DOCD IN RCRD: CPT | Mod: CPTII,S$GLB,, | Performed by: FAMILY MEDICINE

## 2022-05-26 PROCEDURE — 1160F RVW MEDS BY RX/DR IN RCRD: CPT | Mod: CPTII,S$GLB,, | Performed by: FAMILY MEDICINE

## 2022-05-26 PROCEDURE — 1125F PR PAIN SEVERITY QUANTIFIED, PAIN PRESENT: ICD-10-PCS | Mod: CPTII,S$GLB,, | Performed by: FAMILY MEDICINE

## 2022-05-26 PROCEDURE — 99999 PR PBB SHADOW E&M-EST. PATIENT-LVL III: CPT | Mod: PBBFAC,,, | Performed by: FAMILY MEDICINE

## 2022-05-26 PROCEDURE — 99214 OFFICE O/P EST MOD 30 MIN: CPT | Mod: S$GLB,,, | Performed by: FAMILY MEDICINE

## 2022-05-26 PROCEDURE — 3288F PR FALLS RISK ASSESSMENT DOCUMENTED: ICD-10-PCS | Mod: CPTII,S$GLB,, | Performed by: FAMILY MEDICINE

## 2022-05-26 PROCEDURE — 3288F FALL RISK ASSESSMENT DOCD: CPT | Mod: CPTII,S$GLB,, | Performed by: FAMILY MEDICINE

## 2022-05-26 PROCEDURE — 1125F AMNT PAIN NOTED PAIN PRSNT: CPT | Mod: CPTII,S$GLB,, | Performed by: FAMILY MEDICINE

## 2022-05-26 PROCEDURE — 1160F PR REVIEW ALL MEDS BY PRESCRIBER/CLIN PHARMACIST DOCUMENTED: ICD-10-PCS | Mod: CPTII,S$GLB,, | Performed by: FAMILY MEDICINE

## 2022-05-26 RX ORDER — ERGOCALCIFEROL 1.25 MG/1
CAPSULE ORAL
Qty: 6 CAPSULE | Refills: 6 | Status: SHIPPED | OUTPATIENT
Start: 2022-05-26 | End: 2022-07-30

## 2022-05-26 RX ORDER — ALLOPURINOL 100 MG/1
100 TABLET ORAL DAILY
Qty: 30 TABLET | Refills: 1 | Status: SHIPPED | OUTPATIENT
Start: 2022-05-26 | End: 2022-08-31 | Stop reason: SDUPTHER

## 2022-05-26 RX ORDER — DIAZEPAM 5 MG/1
5 TABLET ORAL DAILY PRN
Qty: 30 TABLET | Refills: 0 | Status: SHIPPED | OUTPATIENT
Start: 2022-05-26 | End: 2022-12-08 | Stop reason: SDUPTHER

## 2022-05-26 NOTE — PROGRESS NOTES
Subjective:       Patient ID: Yisel العلي is a 88 y.o. female.    Chief Complaint: Follow-up (3 mth f/u)    HPI   Follow-up, needing several med refills. Has seen ortho, pain mgt, and cardiology since her last visit with me. Still having dizziness and wants to see ENT. Says her pain is still getting worse.    Review of Systems   Constitutional: Negative for chills and fever.   Musculoskeletal: Positive for arthralgias and back pain.   Neurological: Positive for dizziness. Negative for seizures, syncope and facial asymmetry.       Past Medical History:   Diagnosis Date    Anticoagulant long-term use     Arthritis     CAD (coronary artery disease)     Cancer     skin    Cataract     CKD (chronic kidney disease), stage III     HEARING LOSS     Hematoma complicating a procedure 1213    ant abd wall    [place] (hard of hearing[place]     BILAT AIDS    Hyperlipidemia     Hypertension     Meniere disease     Pneumonia     Retinal detachment     not sure which eye    Vertigo     Wears glasses      Past Surgical History:   Procedure Laterality Date    carotid surgery      left endarterectomy    CATARACT EXTRACTION      ou    CORONARY STENT PLACEMENT      x1    EPIDURAL STEROID INJECTION N/A 5/4/2022    Procedure: Injection, Steroid, Epidural L4-L5;  Surgeon: Madi Call MD;  Location: [facility] OR;  Service: Pain Management;  Laterality: N/A;    HYSTERECTOMY      KNEE SURGERY Right     PARTIAL NEPHRECTOMY  1013    benign left kidney neoplasm    RETINAL DETACHMENT SURGERY       Social History     Socioeconomic History    Marital status:    Tobacco Use    Smoking status: Former Smoker    Smokeless tobacco: Never Used   Substance and Sexual Activity    Alcohol use: Yes     Comment: occasional    Drug use: No   Social History Narrative    ADVANCED MD PLANS         [facility]     Annual exam.  Chronic hypertension.  Obesity.  Hyperlipidemia.  Overactive bladder.   Incontinence.  Tenia corpora.  Epidermal inclusion cysts.    BMD at [facility].    Dr. Mark PCP.    Ditropan XL.    Triamcinolone cream.    Ketoconazole.        Visit Summary    Prescriptions:    SIG: ketoconazole 2 % topical shampoo, 1 days, Dispense #1 Bottle, 11 Refills    Directions: use once daily as directed    SIG: nystatin-triamcinolone 100,000-0.1 unit/gram-% topical ointment,  days, Dispense #120 Gram, 2 Refills    Directions: Apply 2 times a day to affected areas     [facility]     Annual exam.  Obesity.  Chronic hypertension.  Hyperlipidemia.  Vaginal atrophy.  Incontinence.    Mammogram and bone mineral density at [facility].    Continue medications.    Reviewed old.  Cardiologist labs.    Return to clinic for lipid panel and complete metabolic panel.    [place]-[facility].    Patient will followup with no cardiologist at [facility].    Visit Summary     [facility]     Epidermal inclusion cysts.  Candidiasis in groin.  Cystocele.    Urine for culture and sensitivity.    Probiotics over-the-counter.    Continue Premarin cream when necessary.    Visit Summary    Prescriptions:    SIG: clobetasol 0.05 % cream, 120 days, Dispense #120 Container, 0 Refills    Directions: Apply at bedtime    SIG: nystatin 100,000 unit/gram powder,  days, Dispense #120 Bottle, 0 Refills    Directions: Apply 2 times a day to affected areas     GYN Exam/Ibqqddjrelbs149/7/2016     Annual exam.  Lichen sclerosus.  Enterocele.  Rectocele.    Eurax: Betamethasone compounded prescription given.  Return to clinic in 2 months to assess condition.     Family History   Problem Relation Age of Onset    Cancer Father         ?    Heart failure Father     Amblyopia Neg Hx     Blindness Neg Hx     Cataracts Neg Hx     Diabetes Neg Hx     Glaucoma Neg Hx     Hypertension Neg Hx     Macular degeneration Neg Hx     Retinal detachment Neg Hx      "Strabismus Neg Hx     Stroke Neg Hx     Thyroid disease Neg Hx        Objective:      BP (!) 136/52 (BP Location: Left arm, Patient Position: Sitting, BP Method: Medium (Manual))   Pulse 78   Ht 5' 4" (1.626 m)   Wt 87.5 kg (193 lb)   SpO2 96%   BMI 33.13 kg/m²   Physical Exam  Vitals reviewed.   Constitutional:       Appearance: She is obese. She is not ill-appearing or diaphoretic.   HENT:      Head: Normocephalic and atraumatic.   Eyes:      General: No scleral icterus.     Conjunctiva/sclera: Conjunctivae normal.   Cardiovascular:      Rate and Rhythm: Normal rate.   Pulmonary:      Effort: Pulmonary effort is normal. No respiratory distress.   Neurological:      Mental Status: She is alert and oriented to person, place, and time.   Psychiatric:         Mood and Affect: Mood normal.         Behavior: Behavior normal.         Assessment:       1. Gout, unspecified cause, unspecified chronicity, unspecified site    2. Coronary artery disease of native artery of native heart with stable angina pectoris    3. Anxiety    4. Vitamin D deficiency    5. Dizziness        Plan:       Gout, unspecified cause, unspecified chronicity, unspecified site  -     allopurinoL (ZYLOPRIM) 100 MG tablet; Take 1 tablet (100 mg total) by mouth once daily.  Dispense: 30 tablet; Refill: 1    Coronary artery disease of native artery of native heart with stable angina pectoris    Anxiety  -     diazePAM (VALIUM) 5 MG tablet; Take 1 tablet (5 mg total) by mouth daily as needed for Anxiety.  Dispense: 30 tablet; Refill: 0    Vitamin D deficiency  -     ergocalciferol (ERGOCALCIFEROL) 50,000 unit Cap; TAKE ONE CAPSULE BY MOUTH EVERY MONDAY AND FRIDAY  Dispense: 6 capsule; Refill: 6    Dizziness  -     Ambulatory referral/consult to ENT; Future; Expected date: 06/02/2022            Risks, benefits, and side effects were discussed with the patient. All questions were answered to the fullest satisfaction of the patient, and pt verbalized " understanding and agreement to treatment plan. Pt was to call with any new or worsening symptoms, or present to the ER.

## 2022-05-26 NOTE — PROGRESS NOTES
"Your fax has been successfully sent to [phone] at [phone].  ------------------------------------------------------------  From: [id number]  ------------------------------------------------------------  5/26/2022 1:59:17 PM Transmission Record          Sent to [phone] with remote ID "[facility]"          Result: (0/339;0/0) Success          Page record: 1 - 13          Elapsed time: 04:28 on channel 8  "

## 2022-06-01 ENCOUNTER — OFFICE VISIT (OUTPATIENT)
Dept: PAIN MEDICINE | Facility: CLINIC | Age: 87
End: 2022-06-01
Payer: MEDICARE

## 2022-06-01 VITALS
WEIGHT: 193 LBS | HEIGHT: 64 IN | BODY MASS INDEX: 32.95 KG/M2 | RESPIRATION RATE: 18 BRPM | DIASTOLIC BLOOD PRESSURE: 65 MMHG | SYSTOLIC BLOOD PRESSURE: 124 MMHG | HEART RATE: 60 BPM

## 2022-06-01 DIAGNOSIS — M54.16 LUMBAR RADICULOPATHY: ICD-10-CM

## 2022-06-01 DIAGNOSIS — M51.36 DDD (DEGENERATIVE DISC DISEASE), LUMBAR: Primary | ICD-10-CM

## 2022-06-01 DIAGNOSIS — G89.29 CHRONIC PAIN OF LEFT KNEE: ICD-10-CM

## 2022-06-01 DIAGNOSIS — M79.604 LEG PAIN, BILATERAL: ICD-10-CM

## 2022-06-01 DIAGNOSIS — M79.18 MYOFASCIAL PAIN: ICD-10-CM

## 2022-06-01 DIAGNOSIS — M79.605 LEG PAIN, BILATERAL: ICD-10-CM

## 2022-06-01 DIAGNOSIS — M25.562 CHRONIC PAIN OF LEFT KNEE: ICD-10-CM

## 2022-06-01 PROCEDURE — 99999 PR PBB SHADOW E&M-EST. PATIENT-LVL III: ICD-10-PCS | Mod: PBBFAC,,,

## 2022-06-01 PROCEDURE — 99214 PR OFFICE/OUTPT VISIT, EST, LEVL IV, 30-39 MIN: ICD-10-PCS | Mod: S$GLB,,,

## 2022-06-01 PROCEDURE — 3288F FALL RISK ASSESSMENT DOCD: CPT | Mod: CPTII,S$GLB,,

## 2022-06-01 PROCEDURE — 1101F PT FALLS ASSESS-DOCD LE1/YR: CPT | Mod: CPTII,S$GLB,,

## 2022-06-01 PROCEDURE — 1160F RVW MEDS BY RX/DR IN RCRD: CPT | Mod: CPTII,S$GLB,,

## 2022-06-01 PROCEDURE — 1101F PR PT FALLS ASSESS DOC 0-1 FALLS W/OUT INJ PAST YR: ICD-10-PCS | Mod: CPTII,S$GLB,,

## 2022-06-01 PROCEDURE — 3288F PR FALLS RISK ASSESSMENT DOCUMENTED: ICD-10-PCS | Mod: CPTII,S$GLB,,

## 2022-06-01 PROCEDURE — 99214 OFFICE O/P EST MOD 30 MIN: CPT | Mod: S$GLB,,,

## 2022-06-01 PROCEDURE — 1159F PR MEDICATION LIST DOCUMENTED IN MEDICAL RECORD: ICD-10-PCS | Mod: CPTII,S$GLB,,

## 2022-06-01 PROCEDURE — 1160F PR REVIEW ALL MEDS BY PRESCRIBER/CLIN PHARMACIST DOCUMENTED: ICD-10-PCS | Mod: CPTII,S$GLB,,

## 2022-06-01 PROCEDURE — 99999 PR PBB SHADOW E&M-EST. PATIENT-LVL III: CPT | Mod: PBBFAC,,,

## 2022-06-01 PROCEDURE — 1159F MED LIST DOCD IN RCRD: CPT | Mod: CPTII,S$GLB,,

## 2022-06-01 NOTE — PROGRESS NOTES
"FOLLOW UP NOTE:     CHIEF COMPLAINT: back and leg pain    INITIAL HISTORY OF PRESENT ILLNESS: Yisel العلي is a 88 y.o. female with PMH significant for CAD s/p PCI, HTN, CKD, and hx of right knee arthroplasty presents for the evaluation of back pain. The patient reports that her pain began approximately 5-10 years ago after no inciting incident or trauma. The patient reports that her pain has worsened over the past two years. The patient localizes her pain to the area across her lower back. The patient reports of radiation into her hips. The patient describes her pain as an aching type of pain. The patient reports of intermittent numbness in her feet and numbness in her hands. The patient reports that her pain is a 6/10. Patient denies of any fecal incontinence, saddle anesthesia, or weakness.      Of note, the patient is previously saw an Ortho in [place] but she cannot recall the provider's name. She reports that she is not currently seeing an Ortho specialist.      Aggravating factors: walking, housework, prolonged standing     Mitigating factors: sitting, rest    INTERVAL HISTORY OF PRESENT ILLNESS: Yisel العلي is a 88 y.o. female with PMH significant for CAD s/p PCI, HTN, CKD, and hx of right knee arthroplasty presents an established patient, new to me, for the continued management of back pain. The patient is s/p Lumbar interlaminar epidural steroid injection at L4-L5 on 5/4/2022 and reports of no benefit.  Today, the patient reports that the pain in her legs is currently the worst of her current pains. L>R.  She localizes her pain to the anterior aspect of her legs beginning just above her knee and wrapping under her knee into her shin and partial calf. She describes this pain as "sore" in character. She reports that her function is severely limited secondary to her pain. The patient denies knee pain.She does not associate leg pain with her previous knee pain. The patient also reports of lower " "back pain that she denies radiation of this pain.  She can not associate her back pain to her leg pain.  She does however state that if she stands for long periods her leg pain "shoots up into my back." The patient reports benefit of leg pain with volteran gel.  She denies of any obvious alleviating factors. The patient denies of any significant changes in her health since her last appointment. The patient also denies of any changes in the character of her pain since her last appointment. The patient reports that her current pain is a 7/10. Patient denies of any urinary/fecal incontinence, saddle anesthesia, or weakness.     INTERVENTIONAL PAIN HISTORY:   5/4/2022: Lumbar interlaminar epidural steroid injection at L4-L5-no benefit.   CURRENT PAIN MEDICATIONS: N/A        ROS:  Review of Systems   Constitutional: Negative for chills and fever.   HENT: Negative for sore throat.    Eyes: Negative for visual disturbance.   Respiratory: Negative for shortness of breath.    Cardiovascular: Negative for chest pain.   Gastrointestinal: Negative for nausea and vomiting.   Genitourinary: Negative for difficulty urinating.   Musculoskeletal: Positive for arthralgias and back pain.   Skin: Negative for rash.   Allergic/Immunologic: Negative for immunocompromised state.   Neurological: Positive for dizziness. Negative for syncope.   Hematological: Does not bruise/bleed easily.   Psychiatric/Behavioral: Negative for suicidal ideas.   All other systems reviewed and are negative.       MEDICAL, SURGICAL, FAMILY, SOCIAL HX: reviewed    MEDICATIONS/ALLERGIES: reviewed    PHYSICAL EXAM:    VITALS: Vitals reviewed.   Vitals:    06/01/22 1105   BP: 124/65   Pulse: 60   Resp: 18   Weight: 87.5 kg (193 lb)   Height: 5' 4" (1.626 m)       Physical Exam  Vitals and nursing note reviewed.   Constitutional:       Appearance: She is not diaphoretic.   HENT:      Head: Normocephalic and atraumatic.   Eyes:      General:         Right eye: No " discharge.         Left eye: No discharge.      Conjunctiva/sclera: Conjunctivae normal.   Cardiovascular:      Rate and Rhythm: Normal rate.   Pulmonary:      Effort: Pulmonary effort is normal. No respiratory distress.      Breath sounds: Normal breath sounds.   Abdominal:      Palpations: Abdomen is soft.   Skin:     General: Skin is warm and dry.      Findings: No rash.   Neurological:      Mental Status: She is alert and oriented to person, place, and time.   Psychiatric:         Mood and Affect: Mood and affect normal.         Cognition and Memory: Memory normal.         Judgment: Judgment normal.        UPPER EXTREMITIES: Normal alignment, normal range of motion, no atrophy, no skin changes,  hair growth and nail growth normal and equal bilaterally. No swelling, no tenderness.    LOWER EXTREMITIES:  Normal alignment, normal range of motion, no atrophy, no skin changes,  hair growth and nail growth normal and equal bilaterally. No swelling, no tenderness.     LUMBAR SPINE  Lumbar spine: ROM is full with flexion but limited with extension and oblique extension with increased pain with extension     ((--)) Supine straight leg raise    ((+)) Facet loading   Internal and external rotation of the hip causes no increased pain on either side.  Myofascial exam: No tenderness to palpation across lumbar paraspinous muscles.    Knee exam:    Extension/flexion equal bilaterally.   -- crepitus with motion both knees.    -- effusion both knees.    + joint line tenderness     MOTOR: Tone and bulk: normal bilateral upper and lower Strength: normal   Delt      Bi         Tri        WE      WF                        R          5          5          5          5          5          5            L          5          5          5          5          5          5               IP         ADD     ABD     Quad   TA        Gas      HAM  R          5          5          5          5          5          5          5  L          5    "       5          5          5          5          5          5     SENSATION: Light touch and pinprick intact bilaterally  REFLEXES: normal, symmetric, nonbrisk.  Toes down, no clonus. Negative wang's sign bilaterally.  GAIT: antalgic gait; uses a single point cane for assistance with ambulation    IMAGING: no new imaging.     ASSESSMENT: Yisel العلي is a 88 y.o. female with PMH significant for CAD s/p PCI, HTN, CKD, and hx of right knee arthroplasty presents an established patient, new to me,  for the continued management of back pain. The patient is s/p Lumbar interlaminar epidural steroid injection at L4-L5 and reports of no relief.  Today, the patient reports that the pain in her legs is currently the worst of her current pains. L>R.  She localizes her pain to the anterior aspect of her left leg and reports "soreness" to the area above her knee and right below her knee but does not report knee pain. The patient does not associate her leg pain with her back pain.  Treatment plan outlined below.   Encounter Diagnoses   Name Primary?    Chronic pain of left knee     Myofascial pain     Leg pain, bilateral     DDD (degenerative disc disease), lumbar Yes    Lumbar radiculopathy        PLAN:  1. [facility] in [place].    2. Avoid NSAIDS given renal and cardiac co-morbidities.  3. I have stressed the importance of physical activity and a home exercise plan to help with chronic pain and improve health.  4. Consider MRI of lumbar spin if pain persists s/p PT.   5. Consider EMG for leg pain.   6. 6 week follow up.   Rebecca Sharma NP                "

## 2022-06-13 ENCOUNTER — HOSPITAL ENCOUNTER (OUTPATIENT)
Dept: RADIOLOGY | Facility: HOSPITAL | Age: 87
Discharge: HOME | End: 2022-06-13
Attending: FAMILY MEDICINE
Payer: MEDICARE

## 2022-06-13 DIAGNOSIS — R20.2 NUMBNESS AND TINGLING OF BOTH UPPER EXTREMITIES: ICD-10-CM

## 2022-06-13 DIAGNOSIS — R20.0 NUMBNESS AND TINGLING OF BOTH UPPER EXTREMITIES: ICD-10-CM

## 2022-06-13 PROCEDURE — 72040 XR CERVICAL SPINE AP LATERAL: ICD-10-PCS | Mod: 26,,, | Performed by: RADIOLOGY

## 2022-06-13 PROCEDURE — 72040 X-RAY EXAM NECK SPINE 2-3 VW: CPT | Mod: 26,,, | Performed by: RADIOLOGY

## 2022-06-13 PROCEDURE — 72040 X-RAY EXAM NECK SPINE 2-3 VW: CPT | Mod: TC,FY

## 2022-06-16 DIAGNOSIS — M50.30 DEGENERATIVE DISC DISEASE, CERVICAL: Primary | ICD-10-CM

## 2022-06-21 ENCOUNTER — TELEPHONE (OUTPATIENT)
Dept: INTERNAL MEDICINE | Facility: CLINIC | Age: 87
End: 2022-06-21
Payer: MEDICARE

## 2022-06-21 DIAGNOSIS — R42 DIZZINESS: Primary | ICD-10-CM

## 2022-06-21 NOTE — TELEPHONE ENCOUNTER
----- Message from Mayda Call sent at 6/20/2022  4:05 PM CDT -----  Contact: DONNA REYES [[id number]] [phone]  Type:  Patient Requesting Referral    Who Called: DONNA REYES [[id number]]  Does the patient already have the specialty appointment scheduled?: No  If yes, what is the date of that appointment?: N/A  Referral to What Specialty: ENT  Reason for Referral: Dizziness and trouble walking, lightheadedness  Does the patient want the referral with a specific physician?: Yes  [facility] the specialist an [facility]?: Yes - Dr. Alejo Villagomez, [phone]  Patient Requesting a Response?:   Would the patient rather a call back or a response via [facility]? Phone call   Best Call Back Number: [phone]  Additional Information: Patient requesting a call back today.

## 2022-06-21 NOTE — TELEPHONE ENCOUNTER
----- Message from Mayda Call sent at 6/20/2022  4:05 PM CDT -----  Contact: DONNA REYES [[id number]] [phone]  Type:  Patient Requesting Referral    Who Called: DONNA REYES [[id number]]  Does the patient already have the specialty appointment scheduled?: No  If yes, what is the date of that appointment?: N/A  Referral to What Specialty: ENT  Reason for Referral: Dizziness and trouble walking, lightheadedness  Does the patient want the referral with a specific physician?: Yes  Is the specialist [facility]?: Yes - Dr. Alejo Villagomez, [phone]  Patient Requesting a Response?:   Would the patient rather a call back or a response via [facility]? Phone call   Best Call Back Number: [phone]  Additional Information: Patient requesting a call back today.

## 2022-06-22 ENCOUNTER — TELEPHONE (OUTPATIENT)
Dept: FAMILY MEDICINE | Facility: CLINIC | Age: 87
End: 2022-06-22
Payer: MEDICARE

## 2022-06-22 NOTE — TELEPHONE ENCOUNTER
Referral signed. Please let patient know and make sure she has that clinic's number, as I've put this referral in for her before. Thanks

## 2022-06-22 NOTE — TELEPHONE ENCOUNTER
----- Message from Josefina Martinez DO sent at 6/16/2022 12:33 PM CDT -----  Please let Ms. 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 Pain management [facility] I'm going to also refer her there for her neck. Please fax referral to them. Thanks

## 2022-06-23 NOTE — TELEPHONE ENCOUNTER
Attempted to reach pt to notify referral was placed, lvm to call office back to verify she has providers contact # to schedule appt.     Faxed referral to Dr Alejo Villagomez's office at [phone]

## 2022-06-29 ENCOUNTER — TELEPHONE (OUTPATIENT)
Dept: FAMILY MEDICINE | Facility: CLINIC | Age: 87
End: 2022-06-29
Payer: MEDICARE

## 2022-06-29 NOTE — TELEPHONE ENCOUNTER
----- Message from Clarice Jeffery sent at 6/29/2022 10:18 AM CDT -----  Contact: [phone]  Type: Needs Medical Advice  Who Called: Pt     Best Call Back Number: [phone]    Additional Information: Pt would like the nurse to call her about a private matter. Pls call back and advise

## 2022-07-08 ENCOUNTER — TELEPHONE (OUTPATIENT)
Dept: PODIATRY | Facility: CLINIC | Age: 87
End: 2022-07-08
Payer: MEDICARE

## 2022-07-08 NOTE — TELEPHONE ENCOUNTER
Patient couldn't come in for available appt Tuesday and didn't want the next available time slot.

## 2022-07-08 NOTE — TELEPHONE ENCOUNTER
----- Message from Sosa Fanykalee sent at 7/8/2022  3:17 PM CDT -----  Contact: self  Type:  Same Day Appointment Request    Caller is requesting a same day appointment.  Caller declined first available appointment listed below.      Name of Caller:  patient  When is the first available appointment?  7/12/22  Symptoms:  gout and it is worse than ever  Best Call Back Number:  [phone]  Additional Information:   thanks

## 2022-08-03 ENCOUNTER — TELEPHONE (OUTPATIENT)
Dept: PODIATRY | Facility: CLINIC | Age: 87
End: 2022-08-03
Payer: MEDICARE

## 2022-08-03 NOTE — TELEPHONE ENCOUNTER
----- Message from Deann Kaiser sent at 8/3/2022 12:15 PM CDT -----  Contact: pt  Caller is requesting a sooner appointment.  Caller declined first available appointment listed below.  Caller will not accept being placed on the waitlist and is requesting a message be sent to doctor.    Name of Caller:  Pt   When is the first available appointment?  08/10  Symptoms:  Gout   Best Call Back Number:      Additional Information:  Pt is calling to get a sooner appt than 08/10 for gout. Pt adv she need immediate help for he gout. Please call and adv

## 2022-08-05 ENCOUNTER — TELEPHONE (OUTPATIENT)
Dept: PODIATRY | Facility: CLINIC | Age: 87
End: 2022-08-05
Payer: MEDICARE

## 2022-08-05 NOTE — TELEPHONE ENCOUNTER
----- Message from Keiry Castanon sent at 8/5/2022  3:35 PM CDT -----  Contact: Patient  Type:  Needs Medical Advice    Who Called:  Patient      Would the patient rather a call back or a response via [facility]?  Call    Best Call Back Number:  [phone] (home)     Additional Information: Patient needs to speak to the nurse and ask a few questions about her appt on 08/10

## 2022-08-31 DIAGNOSIS — M10.9 GOUT, UNSPECIFIED CAUSE, UNSPECIFIED CHRONICITY, UNSPECIFIED SITE: ICD-10-CM

## 2022-08-31 DIAGNOSIS — R42 DIZZINESS: Primary | ICD-10-CM

## 2022-08-31 RX ORDER — ALLOPURINOL 100 MG/1
100 TABLET ORAL DAILY
Qty: 30 TABLET | Refills: 1 | Status: SHIPPED | OUTPATIENT
Start: 2022-08-31 | End: 2022-09-27

## 2022-08-31 NOTE — TELEPHONE ENCOUNTER
Patient came into the office requesting refill and outpatient [facility] appt with [facility] for [facility]iness.

## 2022-09-08 ENCOUNTER — TELEPHONE (OUTPATIENT)
Dept: FAMILY MEDICINE | Facility: CLINIC | Age: 87
End: 2022-09-08
Payer: MEDICARE

## 2022-09-08 NOTE — TELEPHONE ENCOUNTER
----- Message from Jessica Blackwood sent at 9/8/2022  2:56 PM CDT -----  Pt came in office. Wants somebody to call her about her getting a letter. Please call [phone]

## 2022-09-09 ENCOUNTER — TELEPHONE (OUTPATIENT)
Dept: FAMILY MEDICINE | Facility: CLINIC | Age: 87
End: 2022-09-09
Payer: MEDICARE

## 2022-09-09 NOTE — TELEPHONE ENCOUNTER
----- Message from Jessica Blackwood sent at 9/9/2022  1:35 PM CDT -----  Pt came in office again pt states nobody call her. Please call pt about appt she is not feeling good.[phone].

## 2022-09-19 ENCOUNTER — PATIENT OUTREACH (OUTPATIENT)
Dept: ADMINISTRATIVE | Facility: HOSPITAL | Age: 87
End: 2022-09-19
Payer: MEDICARE

## 2022-09-19 NOTE — PROGRESS NOTES
Population Health chart review. Working a [facility] Managed Medicare report.  Records Received, hyper-linked into chart at this time.   The following record(s)  below were uploaded for Health Maintenance .    x1  IMMUNIZATIONS

## 2022-11-03 ENCOUNTER — LAB VISIT (OUTPATIENT)
Dept: LAB | Facility: HOSPITAL | Age: 87
End: 2022-11-03
Attending: INTERNAL MEDICINE
Payer: MEDICARE

## 2022-11-03 ENCOUNTER — OFFICE VISIT (OUTPATIENT)
Dept: CARDIOLOGY | Facility: CLINIC | Age: 87
End: 2022-11-03
Payer: MEDICARE

## 2022-11-03 VITALS
BODY MASS INDEX: 33.13 KG/M2 | HEIGHT: 64 IN | SYSTOLIC BLOOD PRESSURE: 135 MMHG | DIASTOLIC BLOOD PRESSURE: 69 MMHG | HEART RATE: 50 BPM | OXYGEN SATURATION: 98 %

## 2022-11-03 DIAGNOSIS — G47.19 EXCESSIVE DAYTIME SLEEPINESS: ICD-10-CM

## 2022-11-03 DIAGNOSIS — G45.9 TIA (TRANSIENT ISCHEMIC ATTACK): ICD-10-CM

## 2022-11-03 DIAGNOSIS — E78.2 MIXED HYPERLIPIDEMIA: ICD-10-CM

## 2022-11-03 DIAGNOSIS — R73.01 ELEVATED FASTING GLUCOSE: ICD-10-CM

## 2022-11-03 DIAGNOSIS — R00.1 BRADYCARDIA: ICD-10-CM

## 2022-11-03 DIAGNOSIS — F09 COGNITIVE DYSFUNCTION: ICD-10-CM

## 2022-11-03 DIAGNOSIS — G45.9 TIA (TRANSIENT ISCHEMIC ATTACK): Primary | ICD-10-CM

## 2022-11-03 DIAGNOSIS — R06.09 DOE (DYSPNEA ON EXERTION): ICD-10-CM

## 2022-11-03 DIAGNOSIS — Z91.89 SEDENTARY LIFESTYLE: ICD-10-CM

## 2022-11-03 LAB
ANION GAP SERPL CALC-SCNC: 8 MMOL/L (ref 8–16)
BUN SERPL-MCNC: 16 MG/DL (ref 8–23)
CALCIUM SERPL-MCNC: 11.1 MG/DL (ref 8.7–10.5)
CHLORIDE SERPL-SCNC: 100 MMOL/L (ref 95–110)
CO2 SERPL-SCNC: 26 MMOL/L (ref 23–29)
CREAT SERPL-MCNC: 1 MG/DL (ref 0.5–1.4)
EST. GFR  (NO RACE VARIABLE): 54.2 ML/MIN/1.73 M^2
GLUCOSE SERPL-MCNC: 101 MG/DL (ref 70–110)
POTASSIUM SERPL-SCNC: 4.1 MMOL/L (ref 3.5–5.1)
SODIUM SERPL-SCNC: 134 MMOL/L (ref 136–145)

## 2022-11-03 PROCEDURE — 1101F PT FALLS ASSESS-DOCD LE1/YR: CPT | Mod: CPTII,S$GLB,, | Performed by: INTERNAL MEDICINE

## 2022-11-03 PROCEDURE — 80048 BASIC METABOLIC PNL TOTAL CA: CPT | Performed by: INTERNAL MEDICINE

## 2022-11-03 PROCEDURE — 3288F FALL RISK ASSESSMENT DOCD: CPT | Mod: CPTII,S$GLB,, | Performed by: INTERNAL MEDICINE

## 2022-11-03 PROCEDURE — 3288F PR FALLS RISK ASSESSMENT DOCUMENTED: ICD-10-PCS | Mod: CPTII,S$GLB,, | Performed by: INTERNAL MEDICINE

## 2022-11-03 PROCEDURE — 99214 OFFICE O/P EST MOD 30 MIN: CPT | Mod: 25,S$GLB,, | Performed by: INTERNAL MEDICINE

## 2022-11-03 PROCEDURE — 93000 EKG 12-LEAD: ICD-10-PCS | Mod: S$GLB,,, | Performed by: INTERNAL MEDICINE

## 2022-11-03 PROCEDURE — 1160F PR REVIEW ALL MEDS BY PRESCRIBER/CLIN PHARMACIST DOCUMENTED: ICD-10-PCS | Mod: CPTII,S$GLB,, | Performed by: INTERNAL MEDICINE

## 2022-11-03 PROCEDURE — 1101F PR PT FALLS ASSESS DOC 0-1 FALLS W/OUT INJ PAST YR: ICD-10-PCS | Mod: CPTII,S$GLB,, | Performed by: INTERNAL MEDICINE

## 2022-11-03 PROCEDURE — 1159F MED LIST DOCD IN RCRD: CPT | Mod: CPTII,S$GLB,, | Performed by: INTERNAL MEDICINE

## 2022-11-03 PROCEDURE — 99999 PR PBB SHADOW E&M-EST. PATIENT-LVL III: CPT | Mod: PBBFAC,,, | Performed by: INTERNAL MEDICINE

## 2022-11-03 PROCEDURE — 36415 COLL VENOUS BLD VENIPUNCTURE: CPT | Performed by: INTERNAL MEDICINE

## 2022-11-03 PROCEDURE — 93000 ELECTROCARDIOGRAM COMPLETE: CPT | Mod: S$GLB,,, | Performed by: INTERNAL MEDICINE

## 2022-11-03 PROCEDURE — 99999 PR PBB SHADOW E&M-EST. PATIENT-LVL III: ICD-10-PCS | Mod: PBBFAC,,, | Performed by: INTERNAL MEDICINE

## 2022-11-03 PROCEDURE — 1160F RVW MEDS BY RX/DR IN RCRD: CPT | Mod: CPTII,S$GLB,, | Performed by: INTERNAL MEDICINE

## 2022-11-03 PROCEDURE — 1159F PR MEDICATION LIST DOCUMENTED IN MEDICAL RECORD: ICD-10-PCS | Mod: CPTII,S$GLB,, | Performed by: INTERNAL MEDICINE

## 2022-11-03 PROCEDURE — 99214 PR OFFICE/OUTPT VISIT, EST, LEVL IV, 30-39 MIN: ICD-10-PCS | Mod: 25,S$GLB,, | Performed by: INTERNAL MEDICINE

## 2022-11-03 RX ORDER — EZETIMIBE 10 MG/1
10 TABLET ORAL DAILY
Qty: 90 TABLET | Refills: 3 | Status: SHIPPED | OUTPATIENT
Start: 2022-11-03 | End: 2023-06-29

## 2022-11-03 NOTE — PROGRESS NOTES
Subjective:    Patient ID:  Yisel العلي is a 88 y.o. female who presents for evaluation of No chief complaint on file.  for atypical CP and chronic dizziness, refuse statin due to muscle pain, did not start Zetia, dysarthria for 5 minutes, worry about possible TIA  Came on own  PCP: Dr. Martinez, no recent labs, see q 6 weeks  Gyn: Nikolai Simeon MD in [facility] cardiologist: Dr. Celeste, last seen in 3/2018  Pain: Madi Call MD  Physical M&R: Alix Rivera, DO  Lives alone, no pet, worry about tripping with poor balance for 5 years. Have moved to a big house in [facility], able to walk inside with walker   Daughter, Vandana, RN in [facility], do not have POA   SonDavid, in [place],  do have POA, not medical  Retire teacher, [employer], play competitive bridge twice a week    SDOH: Difficult historian with memory difficulties. Here alone, daughter have to work  Health literacy: Medium  Vaccinations: unsure, need [place] vaccine. Completed COVID, had infection, mild    Activities: ADL's only, no regular exercise, limited by chronic dizziness  Nicotine: Never  Alcohol: 1-2 glasses wine/week, max 2 per day, now once a month.  Illicit drugs: Denies, on Rx Valium but use only once in 6 months.  Cardiac symptoms: could not say the right word  Home BP: Yes - once a week, SBP most under 140  Medication compliance: No, never started Zetia   Diet: regular  Caffeine: 2-3 cups coffee/day  Labs: 11/08/2018 & 04/12/2019: No Troponin and TSH; .0 not on Rx, own preference; A1C 5.6%; Sodium 135L; K+ 4.5; GFR 58L; Glucose 99; WBC 6.9; Hemoglobin 13.2; High Sensitivity 0.30  No TSH, Troponin, BNP:  .2H not on Rx;  A1C 5.6;  Sodium 135L;  K+ 4.4;  Glucose 136H;  GFR 58.5l;  WBC 5.34;  Hemoglobin 13.6;    Lab Results   Component Value Date    TSH 1.750 06/10/2021        Lab Results   Component Value Date    LABA1C 6.1 (H) 01/23/2015    HGBA1C 5.6 01/07/2020       Lab  Results   Component Value Date    WBC 6.24 04/08/2022    HGB 14.1 04/08/2022    HCT 40.8 04/08/2022    MCV 93 04/08/2022     04/08/2022       CMP  Sodium   Date Value Ref Range Status   04/08/2022 133 (L) 136 - 145 mmol/L Final     Potassium   Date Value Ref Range Status   04/08/2022 4.3 3.5 - 5.1 mmol/L Final     Chloride   Date Value Ref Range Status   04/08/2022 99 95 - 110 mmol/L Final     CO2   Date Value Ref Range Status   04/08/2022 25 23 - 29 mmol/L Final     Glucose   Date Value Ref Range Status   04/08/2022 106 70 - 110 mg/dL Final     BUN   Date Value Ref Range Status   04/08/2022 14 8 - 23 mg/dL Final     Creatinine   Date Value Ref Range Status   04/08/2022 0.8 0.5 - 1.4 mg/dL Final     Calcium   Date Value Ref Range Status   04/08/2022 10.4 8.7 - 10.5 mg/dL Final     Total Protein   Date Value Ref Range Status   04/08/2022 6.9 6.0 - 8.4 g/dL Final     Albumin   Date Value Ref Range Status   04/08/2022 4.0 3.5 - 5.2 g/dL Final     Total Bilirubin   Date Value Ref Range Status   04/08/2022 0.6 0.1 - 1.0 mg/dL Final     Comment:     For infants and newborns, interpretation of results should be based  on gestational age, weight and in agreement with clinical  observations.    Premature Infant recommended reference ranges:  Up to 24 hours.............<8.0 mg/dL  Up to 48 hours............<12.0 mg/dL  3-5 days..................<15.0 mg/dL  6-29 days.................<15.0 mg/dL       Alkaline Phosphatase   Date Value Ref Range Status   04/08/2022 56 55 - 135 U/L Final     AST   Date Value Ref Range Status   04/08/2022 18 10 - 40 U/L Final     ALT   Date Value Ref Range Status   04/08/2022 17 10 - 44 U/L Final     Anion Gap   Date Value Ref Range Status   04/08/2022 9 8 - 16 mmol/L Final     eGFR if    Date Value Ref Range Status   04/08/2022 >60.0 >60 mL/min/1.73 m^2 Final     eGFR if non    Date Value Ref Range Status   04/08/2022 >60.0 >60 mL/min/1.73 m^2 Final      "Comment:     Calculation used to obtain the estimated glomerular filtration  rate (eGFR) is the CKD-EPI equation.        @labrcntip(troponini)@  No results found for: BNP}   Lab Results   Component Value Date    CHOL 228 (H) 04/08/2022    CHOL 233 (H) 10/06/2020    CHOL 199 06/04/2020     Lab Results   Component Value Date    HDL 53 04/08/2022    HDL 52 10/06/2020    HDL 48 06/04/2020     Lab Results   Component Value Date    LDLCALC 132.4 04/08/2022    LDLCALC 148.0 10/06/2020    LDLCALC 128.0 06/04/2020     Lab Results   Component Value Date    TRIG 213 (H) 04/08/2022    TRIG 165 (H) 10/06/2020    TRIG 115 06/04/2020     Lab Results   Component Value Date    CHOLHDL 23.2 04/08/2022    CHOLHDL 22.3 10/06/2020    CHOLHDL 24.1 06/04/2020      Last Carotid US: 10/2019  Last Echo: 4/2022  Last stress test: 11/08/2018  Cardiovascular angiogram: Can't remember   ECG: SB, 48, otherwise normal  Fundoscopic exam: yearly, No retinopathy noted at present    In 11/2018:  Follow-up  Associated symptoms include coughing, joint swelling, neck pain, numbness and vertigo. Pertinent negatives include no chest pain, diaphoresis, fever, headaches, myalgias, nausea or weakness.   Hyperlipidemia  Pertinent negatives include no chest pain, focal weakness, myalgias or shortness of breath.   Hypertension  Associated symptoms include malaise/fatigue and neck pain. Pertinent negatives include no chest pain, headaches, orthopnea, palpitations, PND or shortness of breath.   Coronary Artery Disease  Symptoms include dizziness, leg swelling and weight gain (up 10 lbs over past 6 months. Refused weight today). Pertinent negatives include no chest pain, muscle weakness, palpitations or shortness of breath. Risk factors include hyperlipidemia.     Dr. Celeste's note - "88 y.o. female who presents for Coronary Artery Disease (Labs); Hypertension; and Hyperlipidemia   DISCUSSED LABS AND GOALS, , NOT TAKING MEDS, , CR 1.06, NO CP OR OCC / " SOB, NOT ACTIVE.   Recall CAD with single stent placed maybe 15+ year ago. Do not recall any cardiac testing. ECG in sinus bradycardia with right axis deviation. Had smoked socially during college. 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 No recent repeat lipid panel. Report compliance with medications with restart of Pravastatin since visit in 3/2018. Not active now except for card playing, stopped walking in the poor for the last 3 weeks due to the water temperature. Was doing about an hour a day. Recall being on Ranexa for a number of years, only take one a day, do not see much benefit. Lot of worries about the not able to walk due to OA with CLBP with left sciatica, also concern of poor balance and falling. Willing to consider going to the gym.    In 9/2019, return for routine follow up. Still some concern about atypical CP for the past 12 years with some chest wall tenderness with hand pressure, also some recurrent dizziness also over the past 25 years. Worry about Carotid stenosis, post left side CEA.    DSE 11/2018 Left ventricle shows mild concentric hypertrophy.  Normal central venous pressure (3 mm Hg).  The estimated PA systolic pressure is 13.11 mm Hg  Left ventricle ejection fraction is increased (hyperdynamic) at 73%  Normal LV diastolic function.  RV systolic function is normal.  There were no arrhythmias during stress.  The EKG portion of this study is negative for myocardial ischemia.  The patient reported no symptoms during the stress test.  No Echo evidence for ischemia    In 3/2020, here for a rushed appointment have appointment [facility]. No change in cardiac status, more active with walking in the pool    In 10/2020, here alone for 6-months review. Difficult problem with clearly cognitive impairment. Some SOB but rare.    In 4/2021, returned by her self for follow up. Significant cognitive impairment as before. Been off oxybutyrin for over 2 months due to concern of  cognitive problem. No heart worries, continue with atypical precordial CP.     In 10/2021, return for 6-months review, still alone. No heart problem. Still did no started Zetia. Able to play bridge regularly and more active in the swimming pool. Would like to see doctor for OA.     In 4/2022, return for review, still lots of pains, now under the care of Madi Call MD. No heart issues.    HPI comments: in 11/2022, return with concern of possible TIA. In the car and could not get the word out. Lasted about 5 minutes, daughter, RN checked VS and felt due to dry mouth but friend is concern for TIA.     Echo 4/2022 - The left ventricle is normal in size with concentric remodeling and normal systolic function.  The estimated ejection fraction is 64%.  Indeterminate left ventricular diastolic function.  The left ventricular global longitudinal strain is -14%. Reduced.  Normal right ventricular size with normal right ventricular systolic function.  Normal central venous pressure (3 mmHg).  The estimated PA systolic pressure is 12 mmHg.  Small pericardial effusion.    Carotid US 10/2019 - No ultrasound evidence to suggest a hemodynamically significant carotid bifurcation stenosis.    Antegrade flow within the vertebral arteries.     Review of Systems   Constitutional: Positive for malaise/fatigue and weight gain (up 10 lbs over past 6 months. Refused weight today). Negative for diaphoresis, fever, night sweats and weight loss.        Refused weighing   HENT:  Positive for hearing loss and stridor. Negative for nosebleeds and tinnitus.    Eyes:  Positive for discharge. Negative for visual disturbance.   Cardiovascular:  Positive for dyspnea on exertion and leg swelling. Negative for chest pain, claudication, cyanosis, irregular heartbeat, near-syncope, orthopnea, palpitations and paroxysmal nocturnal dyspnea.   Respiratory:  Positive for cough, sleep disturbances due to breathing and sputum production. Negative for  "shortness of breath, snoring and wheezing.         [place] score 11 up to a 20 today, awaken frequently for nocturia x 3. Refuse sleep study   Endocrine: Positive for cold intolerance and polyuria. Negative for polydipsia.   Hematologic/Lymphatic: Does not bruise/bleed easily.   Skin:  Positive for dry skin and itching. Negative for color change, flushing, nail changes, poor wound healing and suspicious lesions.   Musculoskeletal:  Positive for arthritis, back pain, gout, joint swelling, muscle cramps, neck pain and stiffness. Negative for falls, joint pain, muscle weakness and myalgias.   Gastrointestinal:  Positive for constipation. Negative for heartburn, hematemesis, hematochezia, melena and nausea.   Genitourinary:  Positive for bladder incontinence, nocturia and urgency.   Neurological:  Positive for disturbances in coordination, excessive daytime sleepiness, dizziness, light-headedness, loss of balance, numbness, paresthesias and vertigo. Negative for focal weakness, headaches and weakness.   Psychiatric/Behavioral:  Positive for memory loss. Negative for depression and substance abuse. The patient does not have insomnia and is not nervous/anxious.       Objective:    Physical Exam  Constitutional:       Appearance: She is well-developed.      Comments: RA O2 98%   HENT:      Head: Normocephalic.   Eyes:      Conjunctiva/sclera: Conjunctivae normal.      Pupils: Pupils are equal, round, and reactive to light.   Neck:      Thyroid: No thyromegaly.      Vascular: No JVD.      Comments: Circumference 14"  Cardiovascular:      Rate and Rhythm: Normal rate. Rhythm irregular.      Pulses: Intact distal pulses.           Carotid pulses are 2+ on the right side with bruit and 2+ on the left side.       Radial pulses are 2+ on the right side and 2+ on the left side.        Femoral pulses are 2+ on the right side and 2+ on the left side.       Popliteal pulses are 2+ on the right side and 2+ on the left side.        " "Dorsalis pedis pulses are 1+ on the right side and 1+ on the left side.        Posterior tibial pulses are 1+ on the right side and 1+ on the left side.      Heart sounds: Murmur heard.   Harsh midsystolic murmur is present with a grade of 2/6 at the upper right sternal border radiating to the neck.     No friction rub. No gallop.      Comments: Bilateral superficial varicose vein.  Pulmonary:      Effort: Pulmonary effort is normal.      Breath sounds: Normal breath sounds. No rales.   Chest:      Chest wall: No tenderness.   Abdominal:      General: Bowel sounds are normal.      Palpations: Abdomen is soft.      Tenderness: There is no abdominal tenderness.      Comments: Waist 46" down to 44.5", hip 45", refuse weight today   Musculoskeletal:         General: Normal range of motion.      Cervical back: Normal range of motion and neck supple.   Lymphadenopathy:      Cervical: No cervical adenopathy.   Skin:     General: Skin is warm and dry.      Findings: No rash.      Comments: Pin to light touch of both LE.   Neurological:      Mental Status: She is alert and oriented to person, place, and time.         Assessment:       1. TIA (transient ischemic attack)    2. Bradycardia    3. Cognitive dysfunction    4. Mixed hyperlipidemia, baseline LDL not available    5. Elevated fasting glucose    6. Excessive daytime sleepiness, refuse sleep study    7. Sedentary lifestyle    8. BOYCE (dyspnea on exertion)         Plan:       Diagnoses and all orders for this visit:    TIA (transient ischemic attack)  -     IN OFFICE EKG 12-LEAD (to Muse)  -     ezetimibe (ZETIA) 10 mg tablet; Take 1 tablet (10 mg total) by mouth once daily.  -     Lipid Panel; Future  -     CRP, High Sensitivity; Future  -     Holter monitor - 48 hour; Future  -     CT Head W Wo Contrast; Future  -     Basic Metabolic Panel; Future    Bradycardia  -     IN OFFICE EKG 12-LEAD (to Muse)  -     Holter monitor - 48 hour; Future    Cognitive " dysfunction    Mixed hyperlipidemia, baseline LDL not available  -     ezetimibe (ZETIA) 10 mg tablet; Take 1 tablet (10 mg total) by mouth once daily.  -     Lipid Panel; Future  -     CRP, High Sensitivity; Future    Elevated fasting glucose  -     Basic Metabolic Panel; Future    Excessive daytime sleepiness, refuse sleep study    Sedentary lifestyle    BOYCE (dyspnea on exertion)    - All medical issues reviewed, willing to try Zetia  - Warning signs of MI and stroke given, would chew 2-4 low-dose ASA (81 mg) if symptom last more than 5 minutes and call 911.  - Need to remain well hydrated but avoid drinking within 4 hours of bedtime.  - CV status and all medications reviewed, patient acknowledge good understanding.  - Instruction for Mediterranean diet and heart healthy exercise given.  - Recommend healthy living: no nicotine, moderate alcohol, healthy diet and regular exercise aiming for fitness, restorative sleep and weight control  - Encourage activities as much as tolerated. Any activity is better than none!  - Check home blood pressure, 2 days weekly, do 2 readings within 5 minutes in AM and PM, keep log for review.  - Weigh twice weekly, try to lose 1-2 lbs per week  - Highly recommend 30-60 minutes of exercise daily, can have Sunday off, with 2-3 sessions of muscle strengthening weekly. A  would be very helpful.  - Highly recommend Yoga, Macho-Chi and or meditation  - Recommend at least biannual cardiovascular evaluation in view of her significant risk factors. Patient's preference  - Should have family member attend every doctor visit if at all possible. Bring all active medication in a bag for review.  - Phone review / encourage use of [facility]  - Will send note to to PC provider for review.         Patient Active Problem List   Diagnosis    Coronary artery disease of native artery of native heart with stable angina pectoris    Elevated fasting glucose    Hypercalcemia    Mixed  hyperlipidemia, baseline LDL not available    HTN (hypertension), benign    Rhinitis    Tear of retina without detachment - Right Eye    EPIPHORA    Dry eyes    Pseudophakia - Both Eyes    Vitamin D deficiency    Anemia    Difficulty walking    Lower extremity pain    Back pain    Nephrolithiasis    Hypertensive heart disease without heart failure    Anxiety    Stress    Hyponatremia    Class 2 severe obesity due to excess calories with serious comorbidity and body mass index (BMI) of 36.0 to 36.9 in adult    Memory difficulties    Poor balance, onset 2014    Status post coronary artery stent placement    BMI 33.0-33.9,adult    Abdominal obesity    Sedentary lifestyle    Chronic bilateral low back pain with left-sided sciatica    Excessive daytime sleepiness, refuse sleep study    Hyperlipidemia    CKD (chronic kidney disease), stage III    Osteoarthritis of multiple joints    Mixed conductive and sensorineural hearing loss of both ears    Atypical chest pain, onset 2007    History of left-sided carotid endarterectomy    Dizziness, onset 1985    Bruit of right carotid artery    Gouty tophi    Statin intolerance    Generalized body aches, onset 2010    Cognitive dysfunction    Personal history of noncompliance with medical treatment, presenting hazards to health    Prerenal azotemia    Bradycardia    Chronic pain syndrome    Neuropathy    Burning sensation of lower extremity    Frail elderly    Idiopathic peripheral neuropathy    Aortic heart murmur    BOYCE (dyspnea on exertion)     Greater than 50% was spent in counseling and coordination of care. The above assessment and plan have been discussed at length. Labs and procedure over the last 6 months reviewed. Problem List updated. Asked to bring in all active medications / pills bottles with next visit.

## 2022-12-08 ENCOUNTER — OFFICE VISIT (OUTPATIENT)
Dept: FAMILY MEDICINE | Facility: CLINIC | Age: 87
End: 2022-12-08
Payer: MEDICARE

## 2022-12-08 VITALS
SYSTOLIC BLOOD PRESSURE: 128 MMHG | OXYGEN SATURATION: 99 % | DIASTOLIC BLOOD PRESSURE: 66 MMHG | RESPIRATION RATE: 17 BRPM | HEART RATE: 50 BPM | HEIGHT: 64 IN | BODY MASS INDEX: 33.13 KG/M2

## 2022-12-08 DIAGNOSIS — M10.9 GOUT, UNSPECIFIED CAUSE, UNSPECIFIED CHRONICITY, UNSPECIFIED SITE: ICD-10-CM

## 2022-12-08 DIAGNOSIS — R42 DIZZINESS: Primary | ICD-10-CM

## 2022-12-08 DIAGNOSIS — G62.9 NEUROPATHY: ICD-10-CM

## 2022-12-08 DIAGNOSIS — F41.9 ANXIETY: ICD-10-CM

## 2022-12-08 DIAGNOSIS — Z23 NEED FOR VACCINATION: ICD-10-CM

## 2022-12-08 DIAGNOSIS — N18.30 CKD (CHRONIC KIDNEY DISEASE), STAGE III: ICD-10-CM

## 2022-12-08 DIAGNOSIS — I11.9 HYPERTENSIVE HEART DISEASE WITHOUT HEART FAILURE: ICD-10-CM

## 2022-12-08 DIAGNOSIS — Z95.5 STATUS POST CORONARY ARTERY STENT PLACEMENT: ICD-10-CM

## 2022-12-08 DIAGNOSIS — I25.118 CORONARY ARTERY DISEASE OF NATIVE ARTERY OF NATIVE HEART WITH STABLE ANGINA PECTORIS: ICD-10-CM

## 2022-12-08 DIAGNOSIS — Z98.890 HISTORY OF LEFT-SIDED CAROTID ENDARTERECTOMY: ICD-10-CM

## 2022-12-08 PROCEDURE — 90694 VACC AIIV4 NO PRSRV 0.5ML IM: CPT | Mod: S$GLB,,, | Performed by: FAMILY MEDICINE

## 2022-12-08 PROCEDURE — 1159F PR MEDICATION LIST DOCUMENTED IN MEDICAL RECORD: ICD-10-PCS | Mod: CPTII,S$GLB,, | Performed by: FAMILY MEDICINE

## 2022-12-08 PROCEDURE — 99214 PR OFFICE/OUTPT VISIT, EST, LEVL IV, 30-39 MIN: ICD-10-PCS | Mod: 25,S$GLB,, | Performed by: FAMILY MEDICINE

## 2022-12-08 PROCEDURE — 1160F PR REVIEW ALL MEDS BY PRESCRIBER/CLIN PHARMACIST DOCUMENTED: ICD-10-PCS | Mod: CPTII,S$GLB,, | Performed by: FAMILY MEDICINE

## 2022-12-08 PROCEDURE — 99214 OFFICE O/P EST MOD 30 MIN: CPT | Mod: 25,S$GLB,, | Performed by: FAMILY MEDICINE

## 2022-12-08 PROCEDURE — G0008 FLU VACCINE - QUADRIVALENT - ADJUVANTED: ICD-10-PCS | Mod: S$GLB,,, | Performed by: FAMILY MEDICINE

## 2022-12-08 PROCEDURE — 90694 FLU VACCINE - QUADRIVALENT - ADJUVANTED: ICD-10-PCS | Mod: S$GLB,,, | Performed by: FAMILY MEDICINE

## 2022-12-08 PROCEDURE — G0008 ADMIN INFLUENZA VIRUS VAC: HCPCS | Mod: S$GLB,,, | Performed by: FAMILY MEDICINE

## 2022-12-08 PROCEDURE — 99999 PR PBB SHADOW E&M-EST. PATIENT-LVL III: CPT | Mod: PBBFAC,,, | Performed by: FAMILY MEDICINE

## 2022-12-08 PROCEDURE — 1159F MED LIST DOCD IN RCRD: CPT | Mod: CPTII,S$GLB,, | Performed by: FAMILY MEDICINE

## 2022-12-08 PROCEDURE — 1160F RVW MEDS BY RX/DR IN RCRD: CPT | Mod: CPTII,S$GLB,, | Performed by: FAMILY MEDICINE

## 2022-12-08 PROCEDURE — 99999 PR PBB SHADOW E&M-EST. PATIENT-LVL III: ICD-10-PCS | Mod: PBBFAC,,, | Performed by: FAMILY MEDICINE

## 2022-12-08 RX ORDER — LISINOPRIL 40 MG/1
40 TABLET ORAL DAILY
Qty: 90 TABLET | Refills: 3 | Status: SHIPPED | OUTPATIENT
Start: 2022-12-08 | End: 2023-03-22 | Stop reason: SDUPTHER

## 2022-12-08 RX ORDER — ALLOPURINOL 100 MG/1
100 TABLET ORAL DAILY
Qty: 30 TABLET | Refills: 1 | Status: SHIPPED | OUTPATIENT
Start: 2022-12-08 | End: 2023-03-04

## 2022-12-08 RX ORDER — DIAZEPAM 5 MG/1
5 TABLET ORAL DAILY PRN
Qty: 30 TABLET | Refills: 0 | Status: SHIPPED | OUTPATIENT
Start: 2022-12-08 | End: 2023-06-29

## 2022-12-08 NOTE — PROGRESS NOTES
" Patient ID: Yisel العلي is a 89 y.o. female.    Chief Complaint: Follow-up (Discuss shingles & covid vaccine) and Medication Refill      Reviewed family, medical, surgical, and social history.    No cp/sob  No change in mental status  No fever  No asymmetrical limb swelling    Objective:      /66 (BP Location: Left arm, Patient Position: Sitting, BP Method: Medium (Manual))   Pulse (!) 50   Resp 17   Ht 5' 4" (1.626 m)   SpO2 99%   BMI 33.13 kg/m²   RRR, CTAB, s/nt/nd, no c/c/e, non-toxic appearing, no focal weakness  Assessment:       1. Dizziness    2. Gout, unspecified cause, unspecified chronicity, unspecified site    3. Hypertensive heart disease without heart failure    4. Coronary artery disease of native artery of native heart with stable angina pectoris    5. Status post coronary artery stent placement    6. CKD (chronic kidney disease), stage III    7. History of left-sided carotid endarterectomy    8. Anxiety    9. Neuropathy    10. Need for vaccination          Plan:       Dizziness  -     Ambulatory referral/consult to Physical/Occupational Therapy; Future; Expected date: 12/15/2022    Gout, unspecified cause, unspecified chronicity, unspecified site  -     allopurinoL (ZYLOPRIM) 100 MG tablet; Take 1 tablet (100 mg total) by mouth once daily.  Dispense: 30 tablet; Refill: 1    Hypertensive heart disease without heart failure  -     lisinopriL (PRINIVIL,ZESTRIL) 40 MG tablet; Take 1 tablet (40 mg total) by mouth once daily.  Dispense: 90 tablet; Refill: 3    Coronary artery disease of native artery of native heart with stable angina pectoris  -     lisinopriL (PRINIVIL,ZESTRIL) 40 MG tablet; Take 1 tablet (40 mg total) by mouth once daily.  Dispense: 90 tablet; Refill: 3    Status post coronary artery stent placement  -     lisinopriL (PRINIVIL,ZESTRIL) 40 MG tablet; Take 1 tablet (40 mg total) by mouth once daily.  Dispense: 90 tablet; Refill: 3    CKD (chronic kidney disease), stage " III  -     lisinopriL (PRINIVIL,ZESTRIL) 40 MG tablet; Take 1 tablet (40 mg total) by mouth once daily.  Dispense: 90 tablet; Refill: 3    History of left-sided carotid endarterectomy  -     lisinopriL (PRINIVIL,ZESTRIL) 40 MG tablet; Take 1 tablet (40 mg total) by mouth once daily.  Dispense: 90 tablet; Refill: 3    Anxiety  -     diazePAM (VALIUM) 5 MG tablet; Take 1 tablet (5 mg total) by mouth daily as needed for Anxiety.  Dispense: 30 tablet; Refill: 0    Neuropathy  -     Ambulatory referral/consult to Neurology; Future; Expected date: 12/15/2022    Need for vaccination  -     Influenza - Quadrivalent (Adjuvanted)            Continue current medicines, any changes noted above  Labs, radiology studies, and procedures/notes from the last 3 months were reviewed.    Risks, benefits, and side effects were discussed with the patient. All questions were answered to the fullest satisfaction of the patient, and pt verbalized understanding and agreement to treatment plan. Pt was to call with any new or worsening symptoms, or present to the ER.

## 2022-12-16 ENCOUNTER — OFFICE VISIT (OUTPATIENT)
Dept: URGENT CARE | Facility: CLINIC | Age: 87
End: 2022-12-16
Payer: MEDICARE

## 2022-12-16 VITALS
TEMPERATURE: 98 F | BODY MASS INDEX: 32.44 KG/M2 | HEART RATE: 59 BPM | DIASTOLIC BLOOD PRESSURE: 70 MMHG | SYSTOLIC BLOOD PRESSURE: 164 MMHG | OXYGEN SATURATION: 99 % | WEIGHT: 190 LBS | HEIGHT: 64 IN

## 2022-12-16 DIAGNOSIS — M10.9 ACUTE GOUT OF LEFT FOOT, UNSPECIFIED CAUSE: Primary | ICD-10-CM

## 2022-12-16 PROCEDURE — 1159F PR MEDICATION LIST DOCUMENTED IN MEDICAL RECORD: ICD-10-PCS | Mod: CPTII,S$GLB,, | Performed by: NURSE PRACTITIONER

## 2022-12-16 PROCEDURE — 1125F PR PAIN SEVERITY QUANTIFIED, PAIN PRESENT: ICD-10-PCS | Mod: CPTII,S$GLB,, | Performed by: NURSE PRACTITIONER

## 2022-12-16 PROCEDURE — 1125F AMNT PAIN NOTED PAIN PRSNT: CPT | Mod: CPTII,S$GLB,, | Performed by: NURSE PRACTITIONER

## 2022-12-16 PROCEDURE — 99202 OFFICE O/P NEW SF 15 MIN: CPT | Mod: S$GLB,,, | Performed by: NURSE PRACTITIONER

## 2022-12-16 PROCEDURE — 1159F MED LIST DOCD IN RCRD: CPT | Mod: CPTII,S$GLB,, | Performed by: NURSE PRACTITIONER

## 2022-12-16 PROCEDURE — 1160F RVW MEDS BY RX/DR IN RCRD: CPT | Mod: CPTII,S$GLB,, | Performed by: NURSE PRACTITIONER

## 2022-12-16 PROCEDURE — 99202 PR OFFICE/OUTPT VISIT, NEW, LEVL II, 15-29 MIN: ICD-10-PCS | Mod: S$GLB,,, | Performed by: NURSE PRACTITIONER

## 2022-12-16 PROCEDURE — 1160F PR REVIEW ALL MEDS BY PRESCRIBER/CLIN PHARMACIST DOCUMENTED: ICD-10-PCS | Mod: CPTII,S$GLB,, | Performed by: NURSE PRACTITIONER

## 2022-12-16 RX ORDER — PREDNISONE 10 MG/1
10 TABLET ORAL DAILY
Qty: 4 TABLET | Refills: 0 | Status: SHIPPED | OUTPATIENT
Start: 2022-12-16 | End: 2022-12-20

## 2022-12-16 RX ORDER — COLCHICINE 0.6 MG/1
TABLET ORAL
Qty: 3 TABLET | Refills: 0 | Status: SHIPPED | OUTPATIENT
Start: 2022-12-16 | End: 2023-06-29

## 2022-12-16 NOTE — PROGRESS NOTES
"Subjective:       Patient ID: Yisel العلي is a 89 y.o. female.    Vitals:  vitals were not taken for this visit.     Chief Complaint: Foot Swelling    This is a 89 y.o. female who presents today with a chief complaint of foot pain over MTP joint of left foot. She has mild swelling and TTP of this area. Her friend states "you drink too much wine." Patient denies any fever or chills.     Foot Injury   The incident occurred 12 to 24 hours ago. There was no injury mechanism. The pain is present in the left foot. The pain is at a severity of 10/10. The pain is severe. She has tried NSAIDs for the symptoms.   ROS        Objective:      Physical Exam   Constitutional: She is cooperative. normalawake  HENT:   Head: Normocephalic and atraumatic.   Nose: Nose normal.   Eyes: Conjunctivae are normal. Extraocular movement intact   Neck: Neck supple.   Cardiovascular: Normal rate, regular rhythm, normal heart sounds and normal pulses.   Pulmonary/Chest: Effort normal and breath sounds normal.   Abdominal: Normal appearance.   Musculoskeletal:         General: Swelling and tenderness present.      Comments: +Mild swelling and TTP over MTP joint of left foot. Patient has good TNV of the affected exremity.   Neurological: She is alert.   Skin: Skin is warm, dry and intact.   Vitals reviewed.      Past medical history and current medications reviewed.       Assessment:           No diagnosis found.          Plan:         There are no diagnoses linked to this encounter.         There are no Patient Instructions on file for this visit.                           "

## 2023-02-01 ENCOUNTER — LAB VISIT (OUTPATIENT)
Dept: LAB | Facility: HOSPITAL | Age: 88
End: 2023-02-01
Attending: INTERNAL MEDICINE
Payer: MEDICARE

## 2023-02-01 DIAGNOSIS — E78.2 MIXED HYPERLIPIDEMIA: ICD-10-CM

## 2023-02-01 DIAGNOSIS — G45.9 TIA (TRANSIENT ISCHEMIC ATTACK): ICD-10-CM

## 2023-02-01 LAB
CHOLEST SERPL-MCNC: 197 MG/DL (ref 120–199)
CHOLEST/HDLC SERPL: 3.6 {RATIO} (ref 2–5)
CRP SERPL-MCNC: 0.3 MG/L (ref 0–3.19)
HDLC SERPL-MCNC: 54 MG/DL (ref 40–75)
HDLC SERPL: 27.4 % (ref 20–50)
LDLC SERPL CALC-MCNC: 119.4 MG/DL (ref 63–159)
NONHDLC SERPL-MCNC: 143 MG/DL
TRIGL SERPL-MCNC: 118 MG/DL (ref 30–150)

## 2023-02-01 PROCEDURE — 86141 C-REACTIVE PROTEIN HS: CPT | Performed by: INTERNAL MEDICINE

## 2023-02-01 PROCEDURE — 36415 COLL VENOUS BLD VENIPUNCTURE: CPT | Performed by: INTERNAL MEDICINE

## 2023-02-01 PROCEDURE — 80061 LIPID PANEL: CPT | Performed by: INTERNAL MEDICINE

## 2023-03-12 ENCOUNTER — OFFICE VISIT (OUTPATIENT)
Dept: URGENT CARE | Facility: CLINIC | Age: 88
End: 2023-03-12
Payer: MEDICARE

## 2023-03-12 VITALS
WEIGHT: 190 LBS | OXYGEN SATURATION: 98 % | DIASTOLIC BLOOD PRESSURE: 78 MMHG | TEMPERATURE: 98 F | HEIGHT: 64 IN | HEART RATE: 60 BPM | SYSTOLIC BLOOD PRESSURE: 132 MMHG | BODY MASS INDEX: 32.44 KG/M2

## 2023-03-12 DIAGNOSIS — L03.031 CELLULITIS OF FOURTH TOE OF RIGHT FOOT: Primary | ICD-10-CM

## 2023-03-12 PROCEDURE — 99213 PR OFFICE/OUTPT VISIT, EST, LEVL III, 20-29 MIN: ICD-10-PCS | Mod: 25,S$GLB,, | Performed by: NURSE PRACTITIONER

## 2023-03-12 PROCEDURE — 96372 PR INJECTION,THERAP/PROPH/DIAG2ST, IM OR SUBCUT: ICD-10-PCS | Mod: S$GLB,,, | Performed by: NURSE PRACTITIONER

## 2023-03-12 PROCEDURE — 96372 THER/PROPH/DIAG INJ SC/IM: CPT | Mod: S$GLB,,, | Performed by: NURSE PRACTITIONER

## 2023-03-12 PROCEDURE — 99213 OFFICE O/P EST LOW 20 MIN: CPT | Mod: 25,S$GLB,, | Performed by: NURSE PRACTITIONER

## 2023-03-12 RX ORDER — CEPHALEXIN 500 MG/1
500 CAPSULE ORAL 3 TIMES DAILY
Qty: 30 CAPSULE | Refills: 0 | Status: SHIPPED | OUTPATIENT
Start: 2023-03-12 | End: 2023-03-22

## 2023-03-12 RX ORDER — CEFTRIAXONE 500 MG/1
500 INJECTION, POWDER, FOR SOLUTION INTRAMUSCULAR; INTRAVENOUS
Status: COMPLETED | OUTPATIENT
Start: 2023-03-12 | End: 2023-03-12

## 2023-03-12 RX ADMIN — CEFTRIAXONE 500 MG: 500 INJECTION, POWDER, FOR SOLUTION INTRAMUSCULAR; INTRAVENOUS at 12:03

## 2023-03-12 NOTE — PROGRESS NOTES
"Subjective:       Patient ID: Yisel العلي is a 89 y.o. female.    Vitals:  height is 5' 4" (1.626 m) and weight is 86.2 kg (190 lb). Her oral temperature is 98.1 °F (36.7 °C). Her blood pressure is 132/78 and her pulse is 60. Her oxygen saturation is 98%.     Chief Complaint: Toe Pain (HX OF GOUT)    This is a 89 y.o. female who presents today with a chief complaint of swelling and redness to right 4th toe    Patient presents with:  Toe Pain:  Patient reports pain swelling and redness to right 4th toe has progressively worsened over the past 3 days.  Patient fat evaluation as she reports a history of recurrent gout.        Toe Pain   The incident occurred more than 1 week ago. There was no injury mechanism (GOUT). The pain is present in the right toes. The pain is at a severity of 9/10. The pain is moderate. Associated symptoms include an inability to bear weight and a loss of motion. She reports no foreign bodies present. The symptoms are aggravated by movement.     Constitution: Negative.   HENT: Negative.     Cardiovascular: Negative.    Respiratory: Negative.     Skin:  Positive for erythema.   Neurological:  Negative for disorientation and altered mental status.   Psychiatric/Behavioral: Negative.  Negative for altered mental status, disorientation and confusion.          Objective:      Physical Exam   Constitutional: She is oriented to person, place, and time. She appears well-developed. She is cooperative.  Non-toxic appearance. She does not appear ill. No distress.   HENT:   Head: Normocephalic and atraumatic.   Ears:   Right Ear: Hearing, tympanic membrane, external ear and ear canal normal.   Left Ear: Hearing, tympanic membrane, external ear and ear canal normal.   Nose: Nose normal. No mucosal edema, rhinorrhea or nasal deformity. No epistaxis. Right sinus exhibits no maxillary sinus tenderness and no frontal sinus tenderness. Left sinus exhibits no maxillary sinus tenderness and no frontal sinus " tenderness.   Mouth/Throat: Uvula is midline, oropharynx is clear and moist and mucous membranes are normal. No trismus in the jaw. Normal dentition. No uvula swelling. No oropharyngeal exudate, posterior oropharyngeal edema or posterior oropharyngeal erythema.   Eyes: Conjunctivae and lids are normal. No scleral icterus.   Neck: Trachea normal and phonation normal. Neck supple. No edema present. No erythema present. No neck rigidity present.   Cardiovascular: Normal rate, regular rhythm, normal heart sounds and normal pulses.   Pulmonary/Chest: Effort normal and breath sounds normal. No respiratory distress. She has no decreased breath sounds. She has no rhonchi.   Abdominal: Normal appearance.   Musculoskeletal: Normal range of motion.         General: No deformity. Normal range of motion.      Right foot: Right 4th toe: Exhibits tenderness and swelling (Moderate erythema and swelling noted from distal tip extending 1 cm proximally surrounding nail.  Small pinpoint pustular center noted that is firm to palpation with no fluctuance.  Patient reports areas severely tender.).   Neurological: She is alert and oriented to person, place, and time. She exhibits normal muscle tone. Coordination normal.   Skin: Skin is warm, dry, intact, not diaphoretic and not pale. erythema   Psychiatric: Her speech is normal and behavior is normal. Judgment and thought content normal.   Nursing note and vitals reviewed.      Past medical history and current medications reviewed.       Assessment:           1. Cellulitis of fourth toe of right foot              Plan:       I recommended a needle I&D procedure to the patient to the pustular center to the cellulitis of right 4th toe.  However patient refused this procedure.  I educated patient on usual course of cellulitis with pustular center in the benefits of this procedure.  Patient still refused and stated that she did not want this completed at this time because she was here by  herself.  Therefore I will treat with Rocephin IM and Keflex 500 mg 3 times daily.  I educated patient on signs and symptoms of worsening of infection and recommended patient to return to clinic or follow up with PCP should this occur.    Cellulitis of fourth toe of right foot  -     cefTRIAXone injection 500 mg  -     cephALEXin (KEFLEX) 500 MG capsule; Take 1 capsule (500 mg total) by mouth 3 (three) times daily. for 10 days  Dispense: 30 capsule; Refill: 0             Patient Instructions   Report directly to emergency department for any acute worsening of symptoms.  May take Tylenol for pain as directed.  Recommend application of warm compresses as directed.  Return to clinic or follow up with PCP for any worsening of symptoms over the next 2-3 days.    Rene Wood, CURRYP-C

## 2023-03-12 NOTE — PATIENT INSTRUCTIONS
Report directly to emergency department for any acute worsening of symptoms.  May take Tylenol for pain as directed.  Recommend application of warm compresses as directed.  Return to clinic or follow up with PCP for any worsening of symptoms over the next 2-3 days.

## 2023-03-22 ENCOUNTER — OFFICE VISIT (OUTPATIENT)
Dept: FAMILY MEDICINE | Facility: CLINIC | Age: 88
End: 2023-03-22
Payer: MEDICARE

## 2023-03-22 VITALS
RESPIRATION RATE: 15 BRPM | DIASTOLIC BLOOD PRESSURE: 58 MMHG | OXYGEN SATURATION: 97 % | BODY MASS INDEX: 32.61 KG/M2 | SYSTOLIC BLOOD PRESSURE: 113 MMHG | HEART RATE: 51 BPM | HEIGHT: 64 IN

## 2023-03-22 DIAGNOSIS — R42 VERTIGO: Primary | ICD-10-CM

## 2023-03-22 DIAGNOSIS — N18.30 CKD (CHRONIC KIDNEY DISEASE), STAGE III: ICD-10-CM

## 2023-03-22 DIAGNOSIS — I11.9 HYPERTENSIVE HEART DISEASE WITHOUT HEART FAILURE: ICD-10-CM

## 2023-03-22 DIAGNOSIS — M10.9 GOUT, UNSPECIFIED CAUSE, UNSPECIFIED CHRONICITY, UNSPECIFIED SITE: ICD-10-CM

## 2023-03-22 DIAGNOSIS — E55.9 VITAMIN D DEFICIENCY: ICD-10-CM

## 2023-03-22 DIAGNOSIS — Z95.5 STATUS POST CORONARY ARTERY STENT PLACEMENT: ICD-10-CM

## 2023-03-22 DIAGNOSIS — I25.118 CORONARY ARTERY DISEASE OF NATIVE ARTERY OF NATIVE HEART WITH STABLE ANGINA PECTORIS: ICD-10-CM

## 2023-03-22 DIAGNOSIS — Z98.890 HISTORY OF LEFT-SIDED CAROTID ENDARTERECTOMY: ICD-10-CM

## 2023-03-22 PROCEDURE — 99999 PR PBB SHADOW E&M-EST. PATIENT-LVL III: CPT | Mod: PBBFAC,,, | Performed by: FAMILY MEDICINE

## 2023-03-22 PROCEDURE — 99214 PR OFFICE/OUTPT VISIT, EST, LEVL IV, 30-39 MIN: ICD-10-PCS | Mod: S$GLB,,, | Performed by: FAMILY MEDICINE

## 2023-03-22 PROCEDURE — 99214 OFFICE O/P EST MOD 30 MIN: CPT | Mod: S$GLB,,, | Performed by: FAMILY MEDICINE

## 2023-03-22 PROCEDURE — 99999 PR PBB SHADOW E&M-EST. PATIENT-LVL III: ICD-10-PCS | Mod: PBBFAC,,, | Performed by: FAMILY MEDICINE

## 2023-03-22 RX ORDER — DOXYCYCLINE HYCLATE 100 MG
100 TABLET ORAL 2 TIMES DAILY
Qty: 20 TABLET | Refills: 0 | Status: SHIPPED | OUTPATIENT
Start: 2023-03-22 | End: 2023-06-29

## 2023-03-22 RX ORDER — ERGOCALCIFEROL 1.25 MG/1
CAPSULE ORAL
Qty: 16 CAPSULE | Refills: 3 | Status: SHIPPED | OUTPATIENT
Start: 2023-03-22 | End: 2023-03-27

## 2023-03-22 RX ORDER — LISINOPRIL 40 MG/1
40 TABLET ORAL DAILY
Qty: 90 TABLET | Refills: 3 | Status: SHIPPED | OUTPATIENT
Start: 2023-03-22 | End: 2023-10-05 | Stop reason: SDUPTHER

## 2023-03-22 RX ORDER — ALLOPURINOL 100 MG/1
100 TABLET ORAL DAILY
Qty: 90 TABLET | Refills: 3 | Status: SHIPPED | OUTPATIENT
Start: 2023-03-22 | End: 2023-08-02 | Stop reason: SDUPTHER

## 2023-03-22 NOTE — PROGRESS NOTES
" Patient ID: Yisel العلي is a 89 y.o. female.    Chief Complaint: Follow-up and Medication Refill      Reviewed family, medical, surgical, and social history.    No cp/sob  No change in mental status  No fever  No asymmetrical limb swelling    Objective:      BP (!) 113/58 (BP Location: Right arm, Patient Position: Sitting, BP Method: Medium (Manual))   Pulse (!) 51   Resp 15   Ht 5' 4" (1.626 m)   SpO2 97%   BMI 32.61 kg/m²   RRR, CTAB, s/nt/nd, no c/c/e, non-toxic appearing, no focal weakness  Assessment:       1. Vertigo    2. Vitamin D deficiency    3. Gout, unspecified cause, unspecified chronicity, unspecified site    4. Hypertensive heart disease without heart failure    5. Coronary artery disease of native artery of native heart with stable angina pectoris    6. Status post coronary artery stent placement    7. CKD (chronic kidney disease), stage III    8. History of left-sided carotid endarterectomy          Plan:       Vertigo    Vitamin D deficiency  -     ergocalciferol (ERGOCALCIFEROL) 50,000 unit Cap; TAKE 1 CAPSULE BY MOUTH EVERY MONDAY AND FRIDAY  Dispense: 16 capsule; Refill: 3    Gout, unspecified cause, unspecified chronicity, unspecified site  -     allopurinoL (ZYLOPRIM) 100 MG tablet; Take 1 tablet (100 mg total) by mouth once daily.  Dispense: 90 tablet; Refill: 3    Hypertensive heart disease without heart failure  -     lisinopriL (PRINIVIL,ZESTRIL) 40 MG tablet; Take 1 tablet (40 mg total) by mouth once daily.  Dispense: 90 tablet; Refill: 3    Coronary artery disease of native artery of native heart with stable angina pectoris  -     lisinopriL (PRINIVIL,ZESTRIL) 40 MG tablet; Take 1 tablet (40 mg total) by mouth once daily.  Dispense: 90 tablet; Refill: 3    Status post coronary artery stent placement  -     lisinopriL (PRINIVIL,ZESTRIL) 40 MG tablet; Take 1 tablet (40 mg total) by mouth once daily.  Dispense: 90 tablet; Refill: 3    CKD (chronic kidney disease), stage III  -   "   lisinopriL (PRINIVIL,ZESTRIL) 40 MG tablet; Take 1 tablet (40 mg total) by mouth once daily.  Dispense: 90 tablet; Refill: 3    History of left-sided carotid endarterectomy  -     lisinopriL (PRINIVIL,ZESTRIL) 40 MG tablet; Take 1 tablet (40 mg total) by mouth once daily.  Dispense: 90 tablet; Refill: 3    Other orders  -     doxycycline (VIBRA-TABS) 100 MG tablet; Take 1 tablet (100 mg total) by mouth 2 (two) times daily.  Dispense: 20 tablet; Refill: 0              Continue current medicines, any changes noted above  Labs, radiology studies, and procedures/notes from the last 3 months were reviewed.    Risks, benefits, and side effects were discussed with the patient. All questions were answered to the fullest satisfaction of the patient, and pt verbalized understanding and agreement to treatment plan. Pt was to call with any new or worsening symptoms, or present to the ER.

## 2023-04-20 ENCOUNTER — CLINICAL SUPPORT (OUTPATIENT)
Dept: REHABILITATION | Facility: HOSPITAL | Age: 88
End: 2023-04-20
Attending: FAMILY MEDICINE
Payer: MEDICARE

## 2023-04-20 DIAGNOSIS — Z74.09 IMPAIRED FUNCTIONAL MOBILITY, BALANCE, GAIT, AND ENDURANCE: ICD-10-CM

## 2023-04-20 DIAGNOSIS — R42 VERTIGO: ICD-10-CM

## 2023-04-20 PROCEDURE — 97162 PT EVAL MOD COMPLEX 30 MIN: CPT | Mod: PN

## 2023-04-20 NOTE — PLAN OF CARE
[facility]  Physical Therapy Initial Evaluation    Name: Yisel Villa Severiano  Clinic Number: [id number]    Therapy Diagnosis:   Encounter Diagnoses   Name Primary?    Vertigo     Impaired functional mobility, balance, gait, and endurance      Physician: Matthew Kaiser MD    Physician Orders: PT Eval and Treat   Medical Diagnosis from Referral: Vertigo   Evaluation Date: 4/20/2023  Authorization Period Expiration: 4/20/2024  Plan of Care Expiration: 7/20/2023  Visit # / Visits authorized: 1/ 1  FOTO Visit #:  1/3    Time In: 11:00 am   Time Out: 12:10 pm   Total Appointment Time (timed & untimed codes): 70 minutes    Precautions: Standard, Fall, and vertigo     Subjective   Date of onset: Chronic - at least 6 months   History of current condition - Yisel reports: concern about her balance, her dizziness, her loss of LE strength. She stated that she has been to [facility] in the past for vertigo and balance therapy - can't remember exactly when.  Yisel has also been to therapy at this clinic - about 3 years ago for balance/back pain/difficulty walking.  She is a poor historian, jumping around with topics as to her exact c/o's and issues with vertigo. She reports dizziness occurs when she is turning over the the bed - onto her left side. She just lies there for about 10-15 mins until it goes away. She stated that she naps quite a bit during the day, has interrupted sleep at night secondary to need to get up 3-4x/night to urinate. Asked Yisel about dizziness when she gets up to walk to the bathroom - she said that she does not have any at that time.  Stated that she tries to move slowly so that she doesn't bring on the dizziness; doesn't look up or tilit her head back - sleeps with multiple pillows; doesn't look down - tries to keep her head up and bend from the waist to pick-up things from the floor; She uses a walker at all times - afraid of falling. Has a tripod RW that she keeps in the car -  easy for her to get it in/out of the car by herself.  She has bilateral LE neuropathy; chronic low back pain with severe degenerative disc disease/spondylosis. Used to use local pool for aquatic exercises but stated that the movement of the water made her dizzy.      Medical History:   Past Medical History:   Diagnosis Date    Anticoagulant long-term use     Arthritis     CAD (coronary artery disease)     Cancer     skin    Cataract     CKD (chronic kidney disease), stage III     HEARING LOSS     Hematoma complicating a procedure 1213    ant abd wall    [place] (hard of hearing)     BILAT AIDS    Hyperlipidemia     Hypertension     Meniere disease     Pneumonia     Retinal detachment     not sure which eye    Vertigo     Wears glasses        Surgical History:   Yisel العلي  has a past surgical history that includes Coronary stent placement; carotid surgery; Hysterectomy; Cataract extraction; Retinal detachment surgery; Partial nephrectomy (1013); Knee surgery (Right); and Epidural steroid injection (N/A, 5/4/2022).    Medications:   Yisel has a current medication list which includes the following prescription(s): allopurinol, ammonium lactate, aspirin, colchicine, diazepam, doxycycline, ergocalciferol, estradiol, ezetimibe, lidocaine-prilocaine, lisinopril, and nitroglycerin 0.1 mg/hr td pt24.    Allergies:   Review of patient's allergies indicates:   Allergen Reactions    Codeine Other (See Comments)     dosent remember reaction;maybe nausea        Imaging, : nothing current     Prior Therapy: Yes - balance/vertigo/chonric LBP  Social History: lives alone, single story home with 2 small steps to enter;  daughter, who is an RN, lives here in [place]; helps her mother with grocery shopping, cleans her house.   Occupation: retired    Prior Level of Function: Mod I in her home, Indep with ADL's; daughter helps her often; uses RW for ambulation; doesn't walk much outside; reduced standing tolerance  secondary to leg pain/low back pain.   Current Level of Function: As above; standing tolerance 5-8 mins - restricted by increased back/leg pain; walking tolerance about 300-350ft; I know I can't walk a block. I can walk from my house to the car and from car into a friends house/restaurant; but I don't walk far. She does still drive.     Pain:  Current 6/10, worst 8/10, best 4/10   Location: left inner thigh into left knee : anterior lower leg   Description: it's just sore.   Aggravating Factors: Standing and Walking  Easing Factors: heating pad and rest    Pts goals: To work on my balance, strength and dizziness     Objective     Blood Pressure screen: had some drop with supine to sit, but patient was asymptomatic at this time.   Supine: 152 / 64  Sittin/ 65  Standin/ 59     Cervical screen:   Sharp Mushtaq: negative    Alar ligament:negative    Vertebral Artery screen: negative    ROM:Cervical AROM is limited in all planes:  Flexion 30 deg - increased dizziness with increased time spent in flexion;    Extension: 28 deg - patient reports she tries not to look up at all    Right Rotation: 42 deg - no symptoms    Left rotation: 36 deg - no symptoms    SB - not functional     ROM/Strength:   UE  WFL   LE  WFL           Oculomotor:   Alignment:     Cover test : negative     Alternating cover test : negative       Fixation:  Nystagmus: R negative   L negative    Visual fields:  Upper quadrant R full ROM , L full ROM ; lower quadrant R Full ROM,  L full ROM    Confrontation:  R -, L -   ROM (with/without nystagumus)    Horizontal: @ 45*  normal ; at end range some dizzy feeling - right eye only     Vertical (upgaze present) negative     Diagonal negative    Smooth Pursuits conjugate vs nonconjugate movement : conjugate movement noted in bilateral eyes    Saccades:  Horizontal negative            Vertical negative    Near point convergence normal - < 5 mm    [employer] Horizontal negative , Vertical : brings  on some feeling of imbalance     Vestibular:     Head impulse:  negative  Nystagmus   Head shake:  negative  nystagmus   Tirso Hallpike  R negative , L dizzy feeling, but no nystagmus -patient declined moving into EPLY - did not want to get dizzy.    Roll test R + dizziness, no nystagmus  L - very mild dizziness, no nystagmus     Somatosensory/perceptions screen:   Light touch: Ue's WFL; LE's: impaired secondary to neuropathy distally - ankles and feet    Positional : functional     Balance:   Timed 5x sit<>stand 14.7 sec   TU.7 sec with RW    general gait - wide NOLBERTO, toes out, slow pace with use of RW, choppy steps but stable on firm surfaces;up/down small ramp - able to perform, just slows speed, cannot change speed of gait at all, feels very uncomfortable and will not try.   Tran Balance Scale complete next session.        Limitation/Restriction for FOTO Vestibular  Survey    Therapist reviewed FOTO scores for Yisel العلي on 2023.   FOTO documents entered into [employer] - see Media section.    Limitation Score: 47 - higher number better        Home Exercises and Patient Education Provided    Education provided:   - dizziness/vertigo is chronic, and appears to be associated with restrictions in cervical motion, neuropathy, ? BP.  -does have some dizziness associated with left ear but no nystagmus - roll test > HEP would be most appropriate for her.     Written Home Exercises Provided:  will provide HEP as treatment progresses.   Yisel demonstrated good  understanding of the education provided.       Assessment   Yisel is a 89 y.o. female referred to outpatient Physical Therapy with a medical diagnosis of vertigo.  Pt presents with c/o dizziness, impaired functional mobility, balance, gait and endurance.  Patient also has neuropathy BLE's, chronic lower back pain. She will benefit from Skilled Physical Therapy intervention to address her vertigo as well as balance, LE strength and overall mobility to  improve performance of daily activities.     Pt prognosis is Fair.   Pt will benefit from skilled outpatient Physical Therapy to address the deficits stated above and in the chart below, provide pt/family education, and to maximize pt's level of independence.     Plan of care discussed with patient: Yes  Pt's spiritual, cultural and educational needs considered and patient is agreeable to the plan of care and goals as stated below:     Anticipated Barriers for therapy:     Medical Necessity is demonstrated by the following  History  Co-morbidities and personal factors that may impact the plan of care Co-morbidities:   advanced age, CAD, CKD stage 3, coping style/mechanism, difficulty sleeping, and neuropathy, menieres disease     Personal Factors:   age  lifestyle     moderate   Examination  Body Structures and Functions, activity limitations and participation restrictions that may impact the plan of care Body Regions:   head  neck  back  lower extremities  trunk    Body Systems:    ROM  strength  gross coordinated movement  balance  gait  transfers  transitions    Participation Restrictions:   Only wants to attend PT 1x/week     Activity limitations:   Learning and applying knowledge  no deficits    General Tasks and Commands  no deficits    Communication  no deficits    Mobility  lifting and carrying objects  walking    Self care  no deficits    Domestic Life  shopping  cooking  doing house work (cleaning house, washing dishes, laundry)    Interactions/Relationships  no deficits    Life Areas  no deficits    Community and Social Life  community life  recreation and leisure         high   Clinical Presentation stable and uncomplicated low   Decision Making/ Complexity Score: moderate     Goals:  Short Term Goals: 3 weeks   Able to turn over in bed without dizziness 4 out of 7 nights   Able to pick-up object from floor without increased symptoms   Compliant with HEP     Long Term Goals: 6 weeks   Reduction in # of  ""dizzy" episodes to no more than 2 per week   Increase ambulation distance to > 500ft with use of RW   FOTO dizziness score improved to 52  Able to return to aquatic exercise without increased symptoms at least 2x/week   Independent with HEP     Plan   Plan of care Certification: 4/20/2023 to 7/20/2023.    Outpatient Physical Therapy 1 times weekly for 6 weeks to include the following interventions: Aquatic Therapy, Gait Training, Manual Therapy, Patient Education, Therapeutic Activities, and Therapeutic Exercise.     Sandee Mujica, PT   "

## 2023-05-05 ENCOUNTER — CLINICAL SUPPORT (OUTPATIENT)
Dept: REHABILITATION | Facility: HOSPITAL | Age: 88
End: 2023-05-05
Attending: FAMILY MEDICINE
Payer: MEDICARE

## 2023-05-05 DIAGNOSIS — Z74.09 IMPAIRED FUNCTIONAL MOBILITY, BALANCE, GAIT, AND ENDURANCE: Primary | ICD-10-CM

## 2023-05-05 PROCEDURE — 97112 NEUROMUSCULAR REEDUCATION: CPT | Mod: PN

## 2023-05-05 PROCEDURE — 97110 THERAPEUTIC EXERCISES: CPT | Mod: PN

## 2023-05-05 PROCEDURE — 97140 MANUAL THERAPY 1/> REGIONS: CPT | Mod: PN

## 2023-05-05 NOTE — PROGRESS NOTES
[facility]   Physical Therapy Treatment Note      Name: Yisel Villa [facility]  Clinic Number: [id number]    Therapy Diagnosis:   Encounter Diagnosis   Name Primary?    Impaired functional mobility, balance, gait, and endurance Yes     Physician: Matthew Kaiser MD    Visit Date: 5/5/2023    Physician Orders: PT Eval and Treat   Medical Diagnosis from Referral: Vertigo   Evaluation Date: 4/20/2023  Authorization Period Expiration: 4/20/2024  Plan of Care Expiration: 7/20/2023  Visit # / Visits authorized: 1/ 12  FOTO Visit #:  1/3    PTA Visit #: 0/5     Time In: 12:30 pm   Time Out: 1:30 pm   Total Billable Time: 55 minutes    Subjective     Pt reports: The things we did on my first visit actually helped my dizziness for several days;   She was not compliant with home exercise program.  Response to previous treatment: less dizziness   Functional change: none     Pain: 4/10  Location:  lower back/legs - this is chronic      Objective      Objective Measures updated at progress report unless specified.     Treatment     Yisel received the treatments listed below:      therapeutic exercises to develop strength, endurance, and posture for  18 minutes including:  Nu-Step - level 2 x 10 mins   Seated: scapular retraction - holding tband with each hand at waist - pull apart x 15  Seated: shoulder flexion - yellow tband x 10 each  Seated: bicep curls - yellow tband x 10  Seated triceps extension - yellow tband x 10     manual therapy techniques: cannolith repositioning  were applied to the: left ear  for 12 minutes, including:  Eply maneuver for treatment of left ear - + for creation of vertigo; patient required assistance for all positioning and changes: sit to supine with left cervical rotation to 45 deg with extension; stayed in each position until all symptoms were relieved; returned patient to upright position in sitting; momentary dizziness, but then patient felt better. Proceeded with  "activities as outlined above; Patient with no return of dizziness during activity.     neuromuscular re-education activities to improve: Balance, Sense, Proprioception, and Posture for 25 minutes. The following activities were included:  Toe-Taps - 6" bench - light hand touch on // bars x 2 mins   Tandem Stance: x 1 min with each lead foot - light hand touch on // bars required   Feet together: firm surface x 1 min - cueing for upright trunk posture   Step-ups: 4" step x 10 with each foot   Side-steps - light touch on // bars x 6 lengths   Walking : RW x 2 laps in gym - cueing for step length and foot clearance     therapeutic activities to improve functional performance for   minutes, including:      gait training to improve functional mobility and safety for   minutes, including:    Patient Education and Home Exercises       Education provided:   - gait pattern with use of RW  -head position, sleeping position to reduce onset of dizziness     Written Home Exercises Provided: yes. Exercises were reviewed and Yisel was able to demonstrate them prior to the end of the session.  Yisel demonstrated fair  understanding of the education provided. See EMR under Media for exercises provided during therapy sessions    Assessment     Yisel did very well with Eply procedure to reduce vertigo symptoms; she also tolerated the exercises well with no return of dizziness during activities.  Gave patient a written HEP for UE exercises that she could safely perform at home.     Yisel Is progressing well towards her goals.   Pt prognosis is Fair.     Pt will continue to benefit from skilled outpatient physical therapy to address the deficits listed in the problem list box on initial evaluation, provide pt/family education and to maximize pt's level of independence in the home and community environment.     Pt's spiritual, cultural and educational needs considered and pt agreeable to plan of care and goals.     Anticipated barriers to " "physical therapy: none       Goals:  Short Term Goals: 3 weeks   Able to turn over in bed without dizziness 4 out of 7 nights   Able to pick-up object from floor without increased symptoms   Compliant with HEP      Long Term Goals: 6 weeks   Reduction in # of "dizzy" episodes to no more than 2 per week   Increase ambulation distance to > 500ft with use of RW   FOTO dizziness score improved to 52  Able to return to aquatic exercise without increased symptoms at least 2x/week   Independent with HEP     Plan     Continue with Skilled Physical Therapy 1x/week - address any vertigo c/o and gait/LE weakness     Sandee Mujica, PT      "

## 2023-05-12 ENCOUNTER — CLINICAL SUPPORT (OUTPATIENT)
Dept: REHABILITATION | Facility: HOSPITAL | Age: 88
End: 2023-05-12
Attending: FAMILY MEDICINE
Payer: MEDICARE

## 2023-05-12 DIAGNOSIS — Z74.09 IMPAIRED FUNCTIONAL MOBILITY, BALANCE, GAIT, AND ENDURANCE: Primary | ICD-10-CM

## 2023-05-12 PROCEDURE — 97140 MANUAL THERAPY 1/> REGIONS: CPT | Mod: PN

## 2023-05-12 PROCEDURE — 97112 NEUROMUSCULAR REEDUCATION: CPT | Mod: PN

## 2023-05-12 PROCEDURE — 97110 THERAPEUTIC EXERCISES: CPT | Mod: PN

## 2023-05-12 NOTE — PROGRESS NOTES
"[facility]   Physical Therapy Treatment Note      Name: Yisel العلي  Clinic Number: [id number]    Therapy Diagnosis:   Encounter Diagnosis   Name Primary?    Impaired functional mobility, balance, gait, and endurance Yes     Physician: Matthew Kaiser MD    Visit Date: 5/12/2023    Physician Orders: PT Eval and Treat   Medical Diagnosis from Referral: Vertigo   Evaluation Date: 4/20/2023  Authorization Period Expiration: 4/20/2024  Plan of Care Expiration: 7/20/2023  Visit # / Visits authorized: 2 / 12  FOTO Visit #:  1/3    PTA Visit #: 0/5     Time In: 12:30 pm   Time Out: 1:30 pm   Total Billable Time: 55 minutes    Subjective     Pt reports: I haven't had any of the dizziness since last time, I still have what I call "fogginess" at times.   She was not compliant with home exercise program.  Response to previous treatment: less dizziness   Functional change:feels like she is walking better - taking bigger steps like suggested.     Pain: 4/10  Location:  lower back/legs - this is chronic      Objective      Objective Measures updated at progress report unless specified.     Treatment     Yisel received the treatments listed below:      therapeutic exercises to develop strength, endurance, and posture for  18 minutes including:  Nu-Step - level 2 x 10 mins   Seated: scapular retraction - holding tband with each hand at waist - pull apart x 15  Seated: shoulder flexion - yellow tband x 10 each  Seated: bicep curls - yellow tband x 10  Seated triceps extension - yellow tband x 10     manual therapy techniques: cannolith repositioning  were applied to the: left ear  for 12 minutes, including:  Eply maneuver for treatment of left ear - + for creation of vertigo; patient required assistance for all positioning and changes: sit to supine with left cervical rotation to 45 deg with extension; stayed in each position until all symptoms were relieved; returned patient to upright position in " "sitting; momentary dizziness, but then patient felt better. Proceeded with activities as outlined above; Patient with no return of dizziness during activity.     neuromuscular re-education activities to improve: Balance, Sense, Proprioception, and Posture for 25 minutes. The following activities were included:  Toe-Taps - 6" bench - light hand touch on // bars x 2 mins   Tandem Stance: x 1 min with each lead foot - light hand touch on // bars required   Feet together: firm surface x 1 min - cueing for upright trunk posture   Step-ups: 4" step x 10 with each foot   Side-steps - light touch on // bars x 6 lengths   Walking : RW x 2 laps in gym - cueing for step length and foot clearance     therapeutic activities to improve functional performance for   minutes, including:      gait training to improve functional mobility and safety for   minutes, including:    Patient Education and Home Exercises       Education provided:   - gait pattern with use of RW  -head position, sleeping position to reduce onset of dizziness     Written Home Exercises Provided: yes. Exercises were reviewed and Yisel was able to demonstrate them prior to the end of the session.  Yisel demonstrated fair  understanding of the education provided. See EMR under Media for exercises provided during therapy sessions    Assessment     Yisel did very well with Eply procedure to reduce vertigo symptoms; she also tolerated the exercises well with no return of dizziness during activities.  Encouraged compliance with HEP.     Yisel Is progressing well towards her goals.   Pt prognosis is Fair.     Pt will continue to benefit from skilled outpatient physical therapy to address the deficits listed in the problem list box on initial evaluation, provide pt/family education and to maximize pt's level of independence in the home and community environment.     Pt's spiritual, cultural and educational needs considered and pt agreeable to plan of care and goals.   " "  Anticipated barriers to physical therapy: none       Goals:  Short Term Goals: 3 weeks   Able to turn over in bed without dizziness 4 out of 7 nights   Able to pick-up object from floor without increased symptoms   Compliant with HEP      Long Term Goals: 6 weeks   Reduction in # of "dizzy" episodes to no more than 2 per week   Increase ambulation distance to > 500ft with use of RW   FOTO dizziness score improved to 52  Able to return to aquatic exercise without increased symptoms at least 2x/week   Independent with HEP     Plan     Continue with Skilled Physical Therapy 1x/week - address any vertigo c/o and gait/LE weakness     Sandee Mujica, PT        "

## 2023-05-19 ENCOUNTER — CLINICAL SUPPORT (OUTPATIENT)
Dept: REHABILITATION | Facility: HOSPITAL | Age: 88
End: 2023-05-19
Attending: FAMILY MEDICINE
Payer: MEDICARE

## 2023-05-19 DIAGNOSIS — Z74.09 IMPAIRED FUNCTIONAL MOBILITY, BALANCE, GAIT, AND ENDURANCE: Primary | ICD-10-CM

## 2023-05-19 PROCEDURE — 97110 THERAPEUTIC EXERCISES: CPT | Mod: PN

## 2023-05-19 PROCEDURE — 97140 MANUAL THERAPY 1/> REGIONS: CPT | Mod: PN

## 2023-05-19 NOTE — PROGRESS NOTES
[facility]   Physical Therapy Treatment Note      Name: Yisel العلي  Clinic Number: [id number]    Therapy Diagnosis:   Encounter Diagnosis   Name Primary?    Impaired functional mobility, balance, gait, and endurance Yes     Physician: Matthew Kaiser MD    Visit Date: 5/19/2023    Physician Orders: PT Eval and Treat   Medical Diagnosis from Referral: Vertigo   Evaluation Date: 4/20/2023  Authorization Period Expiration: 4/20/2024  Plan of Care Expiration: 7/20/2023  Visit # / Visits authorized: 3  / 12  FOTO Visit #:  1/3    PTA Visit #: 0/5     Time In: 12:30 pm   Time Out: 1:30 pm   Total Billable Time: 55 minutes    Subjective     Pt reports: I'm so dizzy right now, it started after I woke up,  Yisel stated that she is not room spinning dizzy, she just sees objects moving that should be stationary.   She was not compliant with home exercise program.  Response to previous treatment: less dizziness   Functional change:feels like she is walking better - taking bigger steps like suggested.     Pain: 4/10  Location:  lower back/legs - this is chronic      Objective      Objective Measures updated at progress report unless specified.     Treatment     Yisel received the treatments listed below:      therapeutic exercises to develop strength, endurance, and posture for  25  minutes including:  LAQ's x 10   Sit to stand x 10   Standing heel raises x 10   Standing: 3-way hip x 10 each direction/leg  Standing: mini squats with stable surface support x 10   Standing: Heel Raises x 10     Nu-Step - level 2 x 10 mins   Seated: scapular retraction - holding tband with each hand at waist - pull apart x 15  Seated: shoulder flexion - yellow tband x 10 each  Seated: bicep curls - yellow tband x 10  Seated triceps extension - yellow tband x 10     manual therapy techniques: cannolith repositioning  were applied to the: left ear  for 25 minutes, including:  Eply maneuver for treatment of left ear -  "+ for creation of vertigo; patient required assistance for all positioning and changes: sit to supine with left cervical rotation to 45 deg with extension; stayed in each position until all symptoms were relieved; returned patient to upright position in sitting; momentary dizziness, but then patient felt better. Performed sub-occipital inhibition with head slightly elevated to address restrictions in neck/forward head position; patient immediately felt better with this, able to focus better, Feel that some of her "dizziness" issues are cervical spine related.       neuromuscular re-education activities to improve: Balance, Sense, Proprioception, and Posture for 0 minutes. The following activities were included:  Toe-Taps - 6" bench - light hand touch on // bars x 2 mins   Tandem Stance: x 1 min with each lead foot - light hand touch on // bars required   Feet together: firm surface x 1 min - cueing for upright trunk posture   Step-ups: 4" step x 10 with each foot   Side-steps - light touch on // bars x 6 lengths   Walking : RW x 2 laps in gym - cueing for step length and foot clearance     therapeutic activities to improve functional performance for   minutes, including:      gait training to improve functional mobility and safety for   minutes, including:    Patient Education and Home Exercises       Education provided:   -Need for LE strengthening to be able to progress from walker to cane, go up/down a single step   - gait pattern with use of RW  -head position, sleeping position to reduce onset of dizziness     Written Home Exercises Provided: yes. Updated HEP to include those listed above for LE strengthening; Exercises were reviewed and Yisel was able to demonstrate them prior to the end of the session.  Yisel demonstrated fair  understanding of the education provided. See EMR under Media for exercises provided during therapy sessions    Assessment     Yisel did very well with Eply procedure to reduce vertigo " "symptoms; she also tolerated the exercises well with no return of dizziness during activities. Expressed the inability to go up/down a step when using her cane - has to always have someone assist on other side - explained that legs are weak from inactivity - she quit going to the pool to exercise over 2 years ago. Sits a good deal of her day, also reports sleeping quite a bit as well. Worked on LE strengthening today - gave her a simple HEP that she can do everyday at home.      Yisel Is progressing well towards her goals.   Pt prognosis is Fair.     Pt will continue to benefit from skilled outpatient physical therapy to address the deficits listed in the problem list box on initial evaluation, provide pt/family education and to maximize pt's level of independence in the home and community environment.     Pt's spiritual, cultural and educational needs considered and pt agreeable to plan of care and goals.     Anticipated barriers to physical therapy: none       Goals:  Short Term Goals: 3 weeks   Able to turn over in bed without dizziness 4 out of 7 nights   Able to pick-up object from floor without increased symptoms   Compliant with HEP      Long Term Goals: 6 weeks   Reduction in # of "dizzy" episodes to no more than 2 per week   Increase ambulation distance to > 500ft with use of RW   FOTO dizziness score improved to 52  Able to return to aquatic exercise without increased symptoms at least 2x/week   Independent with HEP     Plan     Continue with Skilled Physical Therapy 1x/week - address any vertigo c/o and gait/LE weakness     Sandee Mujica, PT          "

## 2023-06-02 ENCOUNTER — CLINICAL SUPPORT (OUTPATIENT)
Dept: REHABILITATION | Facility: HOSPITAL | Age: 88
End: 2023-06-02
Attending: FAMILY MEDICINE
Payer: MEDICARE

## 2023-06-02 DIAGNOSIS — Z74.09 IMPAIRED FUNCTIONAL MOBILITY, BALANCE, GAIT, AND ENDURANCE: Primary | ICD-10-CM

## 2023-06-02 PROCEDURE — 97112 NEUROMUSCULAR REEDUCATION: CPT | Mod: PN

## 2023-06-02 PROCEDURE — 97140 MANUAL THERAPY 1/> REGIONS: CPT | Mod: PN

## 2023-06-02 PROCEDURE — 97110 THERAPEUTIC EXERCISES: CPT | Mod: PN

## 2023-06-02 NOTE — PROGRESS NOTES
[facility]   Physical Therapy Treatment Note      Name: Yisel العلي  Clinic Number: [id number]    Therapy Diagnosis:   Encounter Diagnosis   Name Primary?    Impaired functional mobility, balance, gait, and endurance Yes     Physician: Matthew Kaiser MD    Visit Date: 6/2/2023    Physician Orders: PT Eval and Treat   Medical Diagnosis from Referral: Vertigo   Evaluation Date: 4/20/2023  Authorization Period Expiration: 4/20/2024  Plan of Care Expiration: 7/20/2023  Visit # / Visits authorized: 4 / 12  FOTO Visit #:  2/3    PTA Visit #: 0/5     Time In: 1:30 pm   Time Out: 2:30 pm   Total Billable Time: 55 minutes    Subjective     Pt reports:  I'm doing pretty good today; today will be my last visit for a couple of months; I just have so many things to do over the next several months - family gatherings.   She was not compliant with home exercise program.  Response to previous treatment: less dizziness   Functional change:feels like she is walking better - taking bigger steps like suggested.     Pain: 4/10  Location:  lower back/legs - this is chronic      Objective      Objective Measures updated at progress report unless specified.     Treatment     Yisel received the treatments listed below:      therapeutic exercises to develop strength, endurance, and posture for  25  minutes including:  LAQ's x 10   Sit to stand x 10   Standing heel raises x 10   Standing: 3-way hip x 10 each direction/leg  Standing: mini squats with stable surface support x 10   Standing: Heel Raises x 10     Nu-Step - level 2 x 10 mins   Seated: scapular retraction - holding tband with each hand at waist - pull apart x 15  Seated: shoulder flexion - yellow tband x 10 each  Seated: bicep curls - yellow tband x 10  Seated triceps extension - yellow tband x 10     manual therapy techniques: cannolith repositioning  were applied to the: left ear  for 15 minutes, including:  Eply maneuver for treatment of left  "ear - + for creation of vertigo; patient required assistance for all positioning and changes: sit to supine with left cervical rotation to 45 deg with extension; stayed in each position until all symptoms were relieved; returned patient to upright position in sitting; momentary dizziness, but then patient felt better. Performed sub-occipital inhibition with head slightly elevated to address restrictions in neck/forward head position; patient immediately felt better with this, able to focus better, Feel that some of her "dizziness" issues are cervical spine related.       neuromuscular re-education activities to improve: Balance, Sense, Proprioception, and Posture for 20 minutes. The following activities were included:  Toe-Taps - 6" bench - light hand touch on // bars x 2 mins   Tandem Stance: x 1 min with each lead foot - light hand touch on // bars required   Feet together: firm surface x 1 min - cueing for upright trunk posture   Step-ups: 4" step x 10 with each foot   Side-steps - light touch on // bars x 6 lengths   Walking : RW x 2 laps in gym - cueing for step length and foot clearance     therapeutic activities to improve functional performance for   minutes, including:      gait training to improve functional mobility and safety for   minutes, including:    Patient Education and Home Exercises       Education provided:   -Need for LE strengthening to be able to progress from walker to cane, go up/down a single step   - gait pattern with use of RW  -head position, sleeping position to reduce onset of dizziness     Written Home Exercises Provided: yes. Updated HEP to include those listed above for LE strengthening; Exercises were reviewed and Yisel was able to demonstrate them prior to the end of the session.  Yisel demonstrated fair  understanding of the education provided. See EMR under Media for exercises provided during therapy sessions    Assessment     Yisel did very well with Eply procedure to reduce " "vertigo symptoms; she also tolerated the exercises well with no return of dizziness during activities. Expressed the inability to go up/down a step when using her cane - has to always have someone assist on other side - explained that legs are weak from inactivity - she quit going to the pool to exercise over 2 years ago. Sits a good deal of her day, also reports sleeping quite a bit as well. Worked on LE strengthening today - gave her a simple HEP that she can do everyday at home.      Yisel Is progressing well towards her goals.   Pt prognosis is Fair.     Pt will continue to benefit from skilled outpatient physical therapy to address the deficits listed in the problem list box on initial evaluation, provide pt/family education and to maximize pt's level of independence in the home and community environment.     Pt's spiritual, cultural and educational needs considered and pt agreeable to plan of care and goals.     Anticipated barriers to physical therapy: none       Goals:  Short Term Goals: 3 weeks   Able to turn over in bed without dizziness 4 out of 7 nights   Able to pick-up object from floor without increased symptoms   Compliant with HEP      Long Term Goals: 6 weeks   Reduction in # of "dizzy" episodes to no more than 2 per week   Increase ambulation distance to > 500ft with use of RW   FOTO dizziness score improved to 52  Able to return to aquatic exercise without increased symptoms at least 2x/week   Independent with HEP     Plan     Continue with Skilled Physical Therapy 1x/week - address any vertigo c/o and gait/LE weakness. Will see back in about 1-month.     Sandee Mujica, PT            "

## 2023-06-07 ENCOUNTER — TELEPHONE (OUTPATIENT)
Dept: FAMILY MEDICINE | Facility: CLINIC | Age: 88
End: 2023-06-07
Payer: MEDICARE

## 2023-06-07 NOTE — TELEPHONE ENCOUNTER
Attempted to contact Yisel العلي to discuss  r/s appt .    Left voice mail to return our call at [phone] on .    Lucinda Wolf LPN

## 2023-06-07 NOTE — TELEPHONE ENCOUNTER
----- Message from Kayla Davison sent at 6/7/2023 11:02 AM CDT -----  Regarding: Needs Medical Advice/appointment  Contact: patient at [phone] or [phone]  Type: Needs Medical Advice  Who Called:  patient    Best Call Back Number: [phone] or [phone]     Additional Information: patient is requesting a call to discuss doctor leaving and appointment that needs rescheduled. Please call and advise. Thank you

## 2023-06-12 ENCOUNTER — PATIENT MESSAGE (OUTPATIENT)
Dept: FAMILY MEDICINE | Facility: CLINIC | Age: 88
End: 2023-06-12
Payer: MEDICARE

## 2023-06-29 ENCOUNTER — OFFICE VISIT (OUTPATIENT)
Dept: FAMILY MEDICINE | Facility: CLINIC | Age: 88
End: 2023-06-29
Payer: MEDICARE

## 2023-06-29 VITALS
DIASTOLIC BLOOD PRESSURE: 60 MMHG | WEIGHT: 190 LBS | BODY MASS INDEX: 32.44 KG/M2 | OXYGEN SATURATION: 97 % | HEIGHT: 64 IN | SYSTOLIC BLOOD PRESSURE: 100 MMHG | RESPIRATION RATE: 18 BRPM | HEART RATE: 58 BPM

## 2023-06-29 DIAGNOSIS — R41.3 MEMORY DIFFICULTIES: ICD-10-CM

## 2023-06-29 DIAGNOSIS — M1A.9XX1 GOUTY TOPHI: ICD-10-CM

## 2023-06-29 DIAGNOSIS — I10 HTN (HYPERTENSION), BENIGN: Primary | ICD-10-CM

## 2023-06-29 DIAGNOSIS — N39.45 CONTINUOUS LEAKAGE OF URINE: ICD-10-CM

## 2023-06-29 DIAGNOSIS — E66.09 CLASS 1 OBESITY DUE TO EXCESS CALORIES WITH SERIOUS COMORBIDITY AND BODY MASS INDEX (BMI) OF 32.0 TO 32.9 IN ADULT: ICD-10-CM

## 2023-06-29 DIAGNOSIS — E55.9 VITAMIN D DEFICIENCY: ICD-10-CM

## 2023-06-29 PROCEDURE — 1126F PR PAIN SEVERITY QUANTIFIED, NO PAIN PRESENT: ICD-10-PCS | Mod: CPTII,S$GLB,, | Performed by: FAMILY MEDICINE

## 2023-06-29 PROCEDURE — 99214 OFFICE O/P EST MOD 30 MIN: CPT | Mod: S$GLB,,, | Performed by: FAMILY MEDICINE

## 2023-06-29 PROCEDURE — 99214 PR OFFICE/OUTPT VISIT, EST, LEVL IV, 30-39 MIN: ICD-10-PCS | Mod: S$GLB,,, | Performed by: FAMILY MEDICINE

## 2023-06-29 PROCEDURE — 1160F RVW MEDS BY RX/DR IN RCRD: CPT | Mod: CPTII,S$GLB,, | Performed by: FAMILY MEDICINE

## 2023-06-29 PROCEDURE — 99999 PR PBB SHADOW E&M-EST. PATIENT-LVL III: ICD-10-PCS | Mod: PBBFAC,,, | Performed by: FAMILY MEDICINE

## 2023-06-29 PROCEDURE — 1126F AMNT PAIN NOTED NONE PRSNT: CPT | Mod: CPTII,S$GLB,, | Performed by: FAMILY MEDICINE

## 2023-06-29 PROCEDURE — 1159F MED LIST DOCD IN RCRD: CPT | Mod: CPTII,S$GLB,, | Performed by: FAMILY MEDICINE

## 2023-06-29 PROCEDURE — 1101F PT FALLS ASSESS-DOCD LE1/YR: CPT | Mod: CPTII,S$GLB,, | Performed by: FAMILY MEDICINE

## 2023-06-29 PROCEDURE — 1101F PR PT FALLS ASSESS DOC 0-1 FALLS W/OUT INJ PAST YR: ICD-10-PCS | Mod: CPTII,S$GLB,, | Performed by: FAMILY MEDICINE

## 2023-06-29 PROCEDURE — 99999 PR PBB SHADOW E&M-EST. PATIENT-LVL III: CPT | Mod: PBBFAC,,, | Performed by: FAMILY MEDICINE

## 2023-06-29 PROCEDURE — 1159F PR MEDICATION LIST DOCUMENTED IN MEDICAL RECORD: ICD-10-PCS | Mod: CPTII,S$GLB,, | Performed by: FAMILY MEDICINE

## 2023-06-29 PROCEDURE — 1160F PR REVIEW ALL MEDS BY PRESCRIBER/CLIN PHARMACIST DOCUMENTED: ICD-10-PCS | Mod: CPTII,S$GLB,, | Performed by: FAMILY MEDICINE

## 2023-06-29 PROCEDURE — 3288F FALL RISK ASSESSMENT DOCD: CPT | Mod: CPTII,S$GLB,, | Performed by: FAMILY MEDICINE

## 2023-06-29 PROCEDURE — 3288F PR FALLS RISK ASSESSMENT DOCUMENTED: ICD-10-PCS | Mod: CPTII,S$GLB,, | Performed by: FAMILY MEDICINE

## 2023-06-29 RX ORDER — COLCHICINE 0.6 MG/1
TABLET ORAL
Qty: 9 TABLET | Refills: 1 | Status: SHIPPED | OUTPATIENT
Start: 2023-06-29 | End: 2023-08-02 | Stop reason: SDUPTHER

## 2023-06-29 NOTE — PROGRESS NOTES
Subjective:       Patient ID: Yisel العلي is a 89 y.o. female.    Chief Complaint: Follow-up (Medication management )    Chronic dizziness: Getting worse. She can walk but she is worried about her steadiness and is using a walker.     BP Readings from Last 3 Encounters:  06/29/23 : 100/60  03/22/23 : (!) 113/58  03/12/23 : 132/78    She tells me this is not standard for her blood pressure.     She has been on this dose of lisinopril for years and does not think it is contributing.     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     She has chronic urinary incontinence. She has been using pads and diapers     Review of Systems   Constitutional:  Negative for activity change, appetite change, fatigue and fever.   Respiratory:  Negative for shortness of breath.    Gastrointestinal:  Negative for abdominal pain.   Integumentary:  Negative for rash.   Neurological:  Positive for vertigo.       Objective:      Physical Exam  Vitals and nursing note reviewed.   Constitutional:       General: She is not in acute distress.     Appearance: She is not ill-appearing.   Cardiovascular:      Rate and Rhythm: Normal rate and regular rhythm.      Heart sounds: Murmur (holosystolic) heard.   Pulmonary:      Effort: Pulmonary effort is normal.      Breath sounds: Normal breath sounds. No wheezing.   Skin:     General: Skin is warm and dry.      Findings: No rash.   Neurological:      Mental Status: She is alert.   Psychiatric:         Mood and Affect: Mood normal.         Behavior: Behavior normal.       Assessment:       1. Memory difficulties    2. Vitamin D deficiency    3. HTN (hypertension), benign    4. Gouty tophi    5. Continuous leakage of urine        Plan:       Problem List Items Addressed This Visit          Neuro    Memory difficulties - Primary       Cardiac/Vascular    HTN (hypertension), benign     Stable. Does not desire to decrease lisinopril at this time.             Endocrine     Vitamin D deficiency       Orthopedic    Gouty tophi    Relevant Medications    colchicine (COLCRYS) 0.6 mg tablet     Other Visit Diagnoses       Continuous leakage of urine        Relevant Medications    diaper,brief,adult,disposable Misc          Does not desire labs at this time

## 2023-08-02 ENCOUNTER — OFFICE VISIT (OUTPATIENT)
Dept: FAMILY MEDICINE | Facility: CLINIC | Age: 88
End: 2023-08-02
Payer: MEDICARE

## 2023-08-02 VITALS
WEIGHT: 190 LBS | BODY MASS INDEX: 32.44 KG/M2 | OXYGEN SATURATION: 96 % | HEIGHT: 64 IN | SYSTOLIC BLOOD PRESSURE: 134 MMHG | DIASTOLIC BLOOD PRESSURE: 61 MMHG | HEART RATE: 55 BPM

## 2023-08-02 DIAGNOSIS — M10.9 GOUT, UNSPECIFIED CAUSE, UNSPECIFIED CHRONICITY, UNSPECIFIED SITE: ICD-10-CM

## 2023-08-02 DIAGNOSIS — E55.9 VITAMIN D DEFICIENCY: ICD-10-CM

## 2023-08-02 DIAGNOSIS — R42 DIZZINESS: Primary | ICD-10-CM

## 2023-08-02 DIAGNOSIS — M1A.9XX1 GOUTY TOPHI: ICD-10-CM

## 2023-08-02 PROCEDURE — 1159F MED LIST DOCD IN RCRD: CPT | Mod: CPTII,S$GLB,, | Performed by: FAMILY MEDICINE

## 2023-08-02 PROCEDURE — 3288F FALL RISK ASSESSMENT DOCD: CPT | Mod: CPTII,S$GLB,, | Performed by: FAMILY MEDICINE

## 2023-08-02 PROCEDURE — 99214 OFFICE O/P EST MOD 30 MIN: CPT | Mod: S$GLB,,, | Performed by: FAMILY MEDICINE

## 2023-08-02 PROCEDURE — 1101F PR PT FALLS ASSESS DOC 0-1 FALLS W/OUT INJ PAST YR: ICD-10-PCS | Mod: CPTII,S$GLB,, | Performed by: FAMILY MEDICINE

## 2023-08-02 PROCEDURE — 1126F AMNT PAIN NOTED NONE PRSNT: CPT | Mod: CPTII,S$GLB,, | Performed by: FAMILY MEDICINE

## 2023-08-02 PROCEDURE — 3288F PR FALLS RISK ASSESSMENT DOCUMENTED: ICD-10-PCS | Mod: CPTII,S$GLB,, | Performed by: FAMILY MEDICINE

## 2023-08-02 PROCEDURE — 99999 PR PBB SHADOW E&M-EST. PATIENT-LVL III: ICD-10-PCS | Mod: PBBFAC,,, | Performed by: FAMILY MEDICINE

## 2023-08-02 PROCEDURE — 1160F PR REVIEW ALL MEDS BY PRESCRIBER/CLIN PHARMACIST DOCUMENTED: ICD-10-PCS | Mod: CPTII,S$GLB,, | Performed by: FAMILY MEDICINE

## 2023-08-02 PROCEDURE — 99214 PR OFFICE/OUTPT VISIT, EST, LEVL IV, 30-39 MIN: ICD-10-PCS | Mod: S$GLB,,, | Performed by: FAMILY MEDICINE

## 2023-08-02 PROCEDURE — 1160F RVW MEDS BY RX/DR IN RCRD: CPT | Mod: CPTII,S$GLB,, | Performed by: FAMILY MEDICINE

## 2023-08-02 PROCEDURE — 1101F PT FALLS ASSESS-DOCD LE1/YR: CPT | Mod: CPTII,S$GLB,, | Performed by: FAMILY MEDICINE

## 2023-08-02 PROCEDURE — 1159F PR MEDICATION LIST DOCUMENTED IN MEDICAL RECORD: ICD-10-PCS | Mod: CPTII,S$GLB,, | Performed by: FAMILY MEDICINE

## 2023-08-02 PROCEDURE — 99999 PR PBB SHADOW E&M-EST. PATIENT-LVL III: CPT | Mod: PBBFAC,,, | Performed by: FAMILY MEDICINE

## 2023-08-02 PROCEDURE — 1126F PR PAIN SEVERITY QUANTIFIED, NO PAIN PRESENT: ICD-10-PCS | Mod: CPTII,S$GLB,, | Performed by: FAMILY MEDICINE

## 2023-08-02 RX ORDER — ALLOPURINOL 100 MG/1
100 TABLET ORAL DAILY
Qty: 90 TABLET | Refills: 3 | Status: SHIPPED | OUTPATIENT
Start: 2023-08-02

## 2023-08-02 RX ORDER — COLCHICINE 0.6 MG/1
TABLET ORAL
Qty: 9 TABLET | Refills: 1 | Status: SHIPPED | OUTPATIENT
Start: 2023-08-02

## 2023-08-02 RX ORDER — ERGOCALCIFEROL 1.25 MG/1
CAPSULE ORAL
Qty: 8 CAPSULE | Refills: 3 | Status: SHIPPED | OUTPATIENT
Start: 2023-08-02

## 2023-08-02 NOTE — PROGRESS NOTES
Subjective:       Patient ID: Yisel العلي is a 89 y.o. female.    Chief Complaint: Dizziness (Medication refills)    Chronic dizziness: Getting worse. She can walk but she is worried about her steadiness and is using a walker. She is most dizzy when she lays on her left side. Has not seen ENT and would like to.     BP Readings from Last 3 Encounters:  08/02/23 : 134/61  06/29/23 : 100/60  03/22/23 : (!) 113/58  She has been on this dose of lisinopril for years and does not think it is contributing.     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 Needs refills.     Needs refills on her vitamin D sent to a different pharmacy    She has chronic urinary incontinence. She has been using pads and diapers     Dizziness: no fever.    Review of Systems   Constitutional:  Negative for activity change, appetite change, fatigue and fever.   Respiratory:  Negative for shortness of breath.    Gastrointestinal:  Negative for abdominal pain.   Integumentary:  Negative for rash.   Neurological:  Positive for dizziness.         Objective:      Physical Exam  Vitals and nursing note reviewed.   Constitutional:       General: She is not in acute distress.     Appearance: She is not ill-appearing.   Cardiovascular:      Rate and Rhythm: Normal rate and regular rhythm.      Heart sounds: Murmur (holosystolic) heard.   Pulmonary:      Effort: Pulmonary effort is normal.      Breath sounds: Normal breath sounds. No wheezing.   Skin:     General: Skin is warm and dry.      Findings: No rash.   Neurological:      Mental Status: She is alert.   Psychiatric:         Mood and Affect: Mood normal.         Behavior: Behavior normal.         Assessment:       1. Dizziness, onset 1985    2. Vitamin D deficiency    3. Gout, unspecified cause, unspecified chronicity, unspecified site    4. Gouty tophi        Plan:       Problem List Items Addressed This Visit          Endocrine    Vitamin D deficiency    Relevant  Medications    ergocalciferol (ERGOCALCIFEROL) 50,000 unit Cap       Orthopedic    Gouty tophi    Relevant Medications    colchicine (COLCRYS) 0.6 mg tablet       Other    Dizziness, onset 1985 - Primary    Relevant Orders    Ambulatory referral/consult to ENT     Other Visit Diagnoses       Gout, unspecified cause, unspecified chronicity, unspecified site        Relevant Medications    allopurinoL (ZYLOPRIM) 100 MG tablet

## 2023-08-17 ENCOUNTER — OFFICE VISIT (OUTPATIENT)
Dept: OTOLARYNGOLOGY | Facility: CLINIC | Age: 88
End: 2023-08-17
Payer: MEDICARE

## 2023-08-17 VITALS — BODY MASS INDEX: 32.61 KG/M2 | WEIGHT: 190 LBS | DIASTOLIC BLOOD PRESSURE: 57 MMHG | SYSTOLIC BLOOD PRESSURE: 158 MMHG

## 2023-08-17 DIAGNOSIS — H81.10 BPPV (BENIGN PAROXYSMAL POSITIONAL VERTIGO), UNSPECIFIED LATERALITY: Primary | ICD-10-CM

## 2023-08-17 DIAGNOSIS — R42 DIZZINESS: ICD-10-CM

## 2023-08-17 PROCEDURE — 99203 PR OFFICE/OUTPT VISIT, NEW, LEVL III, 30-44 MIN: ICD-10-PCS | Mod: S$GLB,,, | Performed by: OTOLARYNGOLOGY

## 2023-08-17 PROCEDURE — 1160F RVW MEDS BY RX/DR IN RCRD: CPT | Mod: CPTII,S$GLB,, | Performed by: OTOLARYNGOLOGY

## 2023-08-17 PROCEDURE — 99203 OFFICE O/P NEW LOW 30 MIN: CPT | Mod: S$GLB,,, | Performed by: OTOLARYNGOLOGY

## 2023-08-17 PROCEDURE — 3288F FALL RISK ASSESSMENT DOCD: CPT | Mod: CPTII,S$GLB,, | Performed by: OTOLARYNGOLOGY

## 2023-08-17 PROCEDURE — 1160F PR REVIEW ALL MEDS BY PRESCRIBER/CLIN PHARMACIST DOCUMENTED: ICD-10-PCS | Mod: CPTII,S$GLB,, | Performed by: OTOLARYNGOLOGY

## 2023-08-17 PROCEDURE — 1125F AMNT PAIN NOTED PAIN PRSNT: CPT | Mod: CPTII,S$GLB,, | Performed by: OTOLARYNGOLOGY

## 2023-08-17 PROCEDURE — 1101F PT FALLS ASSESS-DOCD LE1/YR: CPT | Mod: CPTII,S$GLB,, | Performed by: OTOLARYNGOLOGY

## 2023-08-17 PROCEDURE — 1101F PR PT FALLS ASSESS DOC 0-1 FALLS W/OUT INJ PAST YR: ICD-10-PCS | Mod: CPTII,S$GLB,, | Performed by: OTOLARYNGOLOGY

## 2023-08-17 PROCEDURE — 1159F PR MEDICATION LIST DOCUMENTED IN MEDICAL RECORD: ICD-10-PCS | Mod: CPTII,S$GLB,, | Performed by: OTOLARYNGOLOGY

## 2023-08-17 PROCEDURE — 99999 PR PBB SHADOW E&M-EST. PATIENT-LVL III: CPT | Mod: PBBFAC,,, | Performed by: OTOLARYNGOLOGY

## 2023-08-17 PROCEDURE — 1159F MED LIST DOCD IN RCRD: CPT | Mod: CPTII,S$GLB,, | Performed by: OTOLARYNGOLOGY

## 2023-08-17 PROCEDURE — 99999 PR PBB SHADOW E&M-EST. PATIENT-LVL III: ICD-10-PCS | Mod: PBBFAC,,, | Performed by: OTOLARYNGOLOGY

## 2023-08-17 PROCEDURE — 1125F PR PAIN SEVERITY QUANTIFIED, PAIN PRESENT: ICD-10-PCS | Mod: CPTII,S$GLB,, | Performed by: OTOLARYNGOLOGY

## 2023-08-17 PROCEDURE — 3288F PR FALLS RISK ASSESSMENT DOCUMENTED: ICD-10-PCS | Mod: CPTII,S$GLB,, | Performed by: OTOLARYNGOLOGY

## 2023-08-17 NOTE — PROGRESS NOTES
Subjective:       Patient ID: Yisel العلي is a 89 y.o. female.    Chief Complaint: Dizziness (Pt c/o dizziness for many years. Pt states its causing her to have trouble when walking. )      This 89-year-old patient comes in describing to me how she is been dizzy for 50 years.  She has a couple of issues one is generalized balance problems and some trouble ambulating but she gets around with a walker slowly and carefully.    She has a long history of positional vertigo and has been treated by physical therapy and more than one occasion with repositioning maneuvers she is never done these at home and despite having these repositioning maneuvers done about six months ago she continues to have the specific issue of dizziness when she rolls over in bed to the left.  She tells me it is very specific it does not bother all that much and she thinks that is a separate problem from her general balance disturbance.      She has prominent hearing loss but wears hearing aids to some success she is obtain those in [place] and they are often checked and tuned up.          Objective:      ENT Physical Exam  Constitutional  Appearance: patient appears well-developed, well-nourished and well-groomed,  Communication/Voice: communication appropriate for developmental age; vocal quality normal;  Head and Face  Appearance: head appears normal, face appears normal and face appears atraumatic;  Salivary: glands normal;  Ear  Hearing: intact;  Auricles: right auricle normal; left auricle normal;  Ear Canals: right ear canal normal; left ear canal normal;  Tympanic Membranes: right tympanic membrane normal; left tympanic membrane normal;  Nose  External Nose: nares patent bilaterally; external nose normal;  Internal Nose: nasal mucosa normal; septum normal; bilateral inferior turbinates normal;  Oral Cavity/Oropharynx  Lips: normal;  Teeth: normal;  Gums: gingiva normal;  Tongue: normal;  Oral mucosa: normal;  Hard palate:  normal;  Soft palate: normal;  Tonsils: normal;  Base of Tongue: normal;  Posterior pharyngeal wall: normal;  Neck  Neck: neck normal; neck palpation normal;  Thyroid: thyroid normal;  Lymphatic  Palpation: lymph nodes normal;          Assessment:       1. BPPV (benign paroxysmal positional vertigo), unspecified laterality    2. Dizziness, onset 1985         Plan:          She has some residual positional vertigo and I think she would benefit from trying home Epley maneuvers at the house instead the methods that are often done by physical therapist.  For one thing there is some merit to doing these at home because after the sequence of maneuvers as perform you can remain in the final position and go to sleep in that position and that has some potential benefit to the outcome as opposed to having this done at the physical therapist getting in her car and having her head move around and nonspecific ways as you make her way home.    In any case she is interested in trying this at home she is to some degree reached an equilibrium with her balance disturbances and even with this residual positional vertigo and while it may be difficult to improve her overall balance and ambulation I do think there is a role to reconsider treatment for her BPPV I have given her a handout and discuss this with her    In the interest of being sure her history was accurate, she is a fairly good historian but there was some short coming in our ability to communicate because of her hearing loss, I reviewed her two most recent family medicine visits with Dr. Harrison 08/02/2023 and 06/29/2023 and and before that her visit with Dr. Kaiser which was specifically for positional vertigo 03/22/2023

## 2023-09-27 ENCOUNTER — TELEPHONE (OUTPATIENT)
Dept: FAMILY MEDICINE | Facility: CLINIC | Age: 88
End: 2023-09-27
Payer: MEDICARE

## 2023-09-27 ENCOUNTER — OFFICE VISIT (OUTPATIENT)
Dept: FAMILY MEDICINE | Facility: CLINIC | Age: 88
End: 2023-09-27
Payer: MEDICARE

## 2023-09-27 VITALS — OXYGEN SATURATION: 99 % | SYSTOLIC BLOOD PRESSURE: 145 MMHG | DIASTOLIC BLOOD PRESSURE: 66 MMHG | HEART RATE: 47 BPM

## 2023-09-27 DIAGNOSIS — K13.0 LIP LESION: Primary | ICD-10-CM

## 2023-09-27 PROCEDURE — 1159F PR MEDICATION LIST DOCUMENTED IN MEDICAL RECORD: ICD-10-PCS | Mod: CPTII,S$GLB,,

## 2023-09-27 PROCEDURE — 1126F AMNT PAIN NOTED NONE PRSNT: CPT | Mod: CPTII,S$GLB,,

## 2023-09-27 PROCEDURE — 1159F MED LIST DOCD IN RCRD: CPT | Mod: CPTII,S$GLB,,

## 2023-09-27 PROCEDURE — 99999 PR PBB SHADOW E&M-EST. PATIENT-LVL III: ICD-10-PCS | Mod: PBBFAC,,,

## 2023-09-27 PROCEDURE — 99213 OFFICE O/P EST LOW 20 MIN: CPT | Mod: S$GLB,,,

## 2023-09-27 PROCEDURE — 99999 PR PBB SHADOW E&M-EST. PATIENT-LVL III: CPT | Mod: PBBFAC,,,

## 2023-09-27 PROCEDURE — 1160F RVW MEDS BY RX/DR IN RCRD: CPT | Mod: CPTII,S$GLB,,

## 2023-09-27 PROCEDURE — 1126F PR PAIN SEVERITY QUANTIFIED, NO PAIN PRESENT: ICD-10-PCS | Mod: CPTII,S$GLB,,

## 2023-09-27 PROCEDURE — 99213 PR OFFICE/OUTPT VISIT, EST, LEVL III, 20-29 MIN: ICD-10-PCS | Mod: S$GLB,,,

## 2023-09-27 PROCEDURE — 1160F PR REVIEW ALL MEDS BY PRESCRIBER/CLIN PHARMACIST DOCUMENTED: ICD-10-PCS | Mod: CPTII,S$GLB,,

## 2023-09-27 RX ORDER — DOCOSANOL 100 MG/G
1 CREAM TOPICAL DAILY
Qty: 2 G | Refills: 0 | Status: SHIPPED | OUTPATIENT
Start: 2023-09-27 | End: 2023-10-05 | Stop reason: SDUPTHER

## 2023-09-27 RX ORDER — DOCOSANOL 100 MG/G
CREAM TOPICAL
Refills: 0 | Status: CANCELLED | OUTPATIENT
Start: 2023-09-27

## 2023-09-27 NOTE — TELEPHONE ENCOUNTER
I discussed patient lip lesion with Dr. Harrison- she recommends her follow up with ENT or Dermatology. She is established with Dr. Maddox. Please help her schedule with him.   Thanks,  Iona

## 2023-09-27 NOTE — TELEPHONE ENCOUNTER
Left detailed message would recommend her see ent for the lip lesion , suggested she give us a call back to schedule.   66.2

## 2023-09-27 NOTE — PROGRESS NOTES
Subjective:       Patient ID: Yisel العلي is a 89 y.o. female.    Chief Complaint: Mass (Bump on bottom lip)    Patient presents to the clinic with complaint of bump on lip.     States she noticed a bump on her bottom lower lip that started around 1 month ago. States it just appeared one day. States sometimes it is larger than others. Denies pain but states occasionally the area is tender. Denies drainage. She has been using Neosporin without relief.     Has no new complaints or concerns today.     Patient educated on plan of care, verbalized understanding.         Review of Systems   Constitutional:  Negative for activity change, appetite change, chills, diaphoresis and fever.   HENT:  Negative for congestion, ear pain, postnasal drip, sinus pressure, sneezing and sore throat.         Lip lesion   Eyes:  Negative for pain, discharge, redness and itching.   Respiratory:  Negative for apnea, cough, chest tightness, shortness of breath and wheezing.    Cardiovascular:  Negative for chest pain and leg swelling.   Gastrointestinal:  Negative for abdominal distention, abdominal pain, constipation, diarrhea, nausea and vomiting.   Genitourinary:  Negative for difficulty urinating, dysuria, flank pain and frequency.   Skin:  Negative for color change, rash and wound.   Neurological:  Negative for dizziness.   All other systems reviewed and are negative.      Patient Active Problem List   Diagnosis    Coronary artery disease of native artery of native heart with stable angina pectoris    Elevated fasting glucose    Hypercalcemia    Mixed hyperlipidemia, baseline LDL not available    HTN (hypertension), benign    Rhinitis    Tear of retina without detachment - Right Eye    EPIPHORA    Dry eyes    Pseudophakia - Both Eyes    Vitamin D deficiency    Anemia    Difficulty walking    Lower extremity pain    Back pain    Nephrolithiasis    Hypertensive heart disease without heart failure    Anxiety    Stress    Hyponatremia     Class 1 obesity due to excess calories with serious comorbidity and body mass index (BMI) of 32.0 to 32.9 in adult    Memory difficulties    Poor balance, onset 2014    Status post coronary artery stent placement    BMI 33.0-33.9,adult    Abdominal obesity    Sedentary lifestyle    Chronic bilateral low back pain with left-sided sciatica    Excessive daytime sleepiness, refuse sleep study    Hyperlipidemia    CKD (chronic kidney disease), stage III    Osteoarthritis of multiple joints    Mixed conductive and sensorineural hearing loss of both ears    Atypical chest pain, onset 2007    History of left-sided carotid endarterectomy    Dizziness, onset 1985    Bruit of right carotid artery    Gouty tophi    Statin intolerance    Generalized body aches, onset 2010    Cognitive dysfunction    Personal history of noncompliance with medical treatment, presenting hazards to health    Prerenal azotemia    Bradycardia    Chronic pain syndrome    Neuropathy    Burning sensation of lower extremity    Frail elderly    Idiopathic peripheral neuropathy    Aortic heart murmur    BOYCE (dyspnea on exertion)    Impaired functional mobility, balance, gait, and endurance       Objective:      Physical Exam  Vitals and nursing note reviewed.   Constitutional:       General: She is not in acute distress.     Appearance: Normal appearance. She is well-developed.   HENT:      Head: Normocephalic.      Nose: Nose normal.      Mouth/Throat:      Lips: Lesions present.        Comments: See picture  Eyes:      Conjunctiva/sclera: Conjunctivae normal.      Pupils: Pupils are equal, round, and reactive to light.   Cardiovascular:      Rate and Rhythm: Normal rate.   Pulmonary:      Effort: Pulmonary effort is normal. No respiratory distress.   Musculoskeletal:      Cervical back: Normal range of motion.   Skin:     General: Skin is warm and dry.      Findings: No rash.   Neurological:      Mental Status: She is alert and oriented to person,  place, and time.   Psychiatric:         Behavior: Behavior normal.           Lab Results   Component Value Date    WBC 6.24 04/08/2022    HGB 14.1 04/08/2022    HCT 40.8 04/08/2022     04/08/2022    CHOL 197 02/01/2023    TRIG 118 02/01/2023    HDL 54 02/01/2023    ALT 17 04/08/2022    AST 18 04/08/2022     (L) 11/03/2022    K 4.1 11/03/2022     11/03/2022    CREATININE 1.0 11/03/2022    BUN 16 11/03/2022    CO2 26 11/03/2022    TSH 1.750 06/10/2021    HGBA1C 5.6 01/07/2020     The ASCVD Risk score (Vibha DK, et al., 2019) failed to calculate for the following reasons:    The 2019 ASCVD risk score is only valid for ages 40 to 79  Visit Vitals  BP (!) 145/66 (BP Location: Left arm, Patient Position: Sitting, BP Method: Medium (Automatic))   Pulse (!) 47   SpO2 99%      Assessment:       1. Lip lesion        Plan:       1. Lip lesion  -     docosanoL 10 % Crea; Apply 1 each topically once daily.  Dispense: 2 g; Refill: 0   - CC with Dr. Harrison- recommends ENT or Dermatology follow up    - Stop using Neosporin   Follow up if symptoms worsen or fail to improve.      Future Appointments       Date Provider Specialty Appt Notes    10/5/2023 Raphael Franklin MD Cardiology annual    11/9/2023 Rosie Harrison MD Family Medicine 3 month f/u

## 2023-09-27 NOTE — PATIENT INSTRUCTIONS

## 2023-10-05 ENCOUNTER — OFFICE VISIT (OUTPATIENT)
Dept: CARDIOLOGY | Facility: CLINIC | Age: 88
End: 2023-10-05
Payer: MEDICARE

## 2023-10-05 VITALS
SYSTOLIC BLOOD PRESSURE: 97 MMHG | HEART RATE: 52 BPM | HEIGHT: 64 IN | OXYGEN SATURATION: 97 % | WEIGHT: 172 LBS | BODY MASS INDEX: 29.37 KG/M2 | DIASTOLIC BLOOD PRESSURE: 63 MMHG

## 2023-10-05 DIAGNOSIS — R42 DIZZINESS: ICD-10-CM

## 2023-10-05 DIAGNOSIS — R54 FRAIL ELDERLY: ICD-10-CM

## 2023-10-05 DIAGNOSIS — I49.8 WANDERING ATRIAL PACEMAKER BY ELECTROCARDIOGRAM: ICD-10-CM

## 2023-10-05 DIAGNOSIS — I10 HTN (HYPERTENSION), BENIGN: ICD-10-CM

## 2023-10-05 DIAGNOSIS — R07.89 ATYPICAL CHEST PAIN: ICD-10-CM

## 2023-10-05 DIAGNOSIS — K13.0 LIP LESION: ICD-10-CM

## 2023-10-05 DIAGNOSIS — N18.30 CKD (CHRONIC KIDNEY DISEASE), STAGE III: ICD-10-CM

## 2023-10-05 DIAGNOSIS — Z91.89 SEDENTARY LIFESTYLE: ICD-10-CM

## 2023-10-05 DIAGNOSIS — G47.19 EXCESSIVE DAYTIME SLEEPINESS: ICD-10-CM

## 2023-10-05 DIAGNOSIS — R00.1 BRADYCARDIA: ICD-10-CM

## 2023-10-05 DIAGNOSIS — Z95.5 STATUS POST CORONARY ARTERY STENT PLACEMENT: ICD-10-CM

## 2023-10-05 DIAGNOSIS — H90.6 MIXED CONDUCTIVE AND SENSORINEURAL HEARING LOSS OF BOTH EARS: ICD-10-CM

## 2023-10-05 DIAGNOSIS — Z98.890 HISTORY OF LEFT-SIDED CAROTID ENDARTERECTOMY: ICD-10-CM

## 2023-10-05 DIAGNOSIS — E65 ABDOMINAL OBESITY: ICD-10-CM

## 2023-10-05 DIAGNOSIS — I25.118 CORONARY ARTERY DISEASE OF NATIVE ARTERY OF NATIVE HEART WITH STABLE ANGINA PECTORIS: Primary | ICD-10-CM

## 2023-10-05 DIAGNOSIS — I11.9 HYPERTENSIVE HEART DISEASE WITHOUT HEART FAILURE: ICD-10-CM

## 2023-10-05 DIAGNOSIS — Z78.9 STATIN INTOLERANCE: ICD-10-CM

## 2023-10-05 PROBLEM — R06.09 DOE (DYSPNEA ON EXERTION): Status: RESOLVED | Noted: 2022-11-03 | Resolved: 2023-10-05

## 2023-10-05 PROCEDURE — 1159F PR MEDICATION LIST DOCUMENTED IN MEDICAL RECORD: ICD-10-PCS | Mod: CPTII,S$GLB,, | Performed by: INTERNAL MEDICINE

## 2023-10-05 PROCEDURE — 93000 ELECTROCARDIOGRAM COMPLETE: CPT | Mod: S$GLB,,, | Performed by: INTERNAL MEDICINE

## 2023-10-05 PROCEDURE — 3288F FALL RISK ASSESSMENT DOCD: CPT | Mod: CPTII,S$GLB,, | Performed by: INTERNAL MEDICINE

## 2023-10-05 PROCEDURE — 99215 OFFICE O/P EST HI 40 MIN: CPT | Mod: S$GLB,,, | Performed by: INTERNAL MEDICINE

## 2023-10-05 PROCEDURE — 99999 PR PBB SHADOW E&M-EST. PATIENT-LVL III: ICD-10-PCS | Mod: PBBFAC,,, | Performed by: INTERNAL MEDICINE

## 2023-10-05 PROCEDURE — 99999 PR PBB SHADOW E&M-EST. PATIENT-LVL III: CPT | Mod: PBBFAC,,, | Performed by: INTERNAL MEDICINE

## 2023-10-05 PROCEDURE — 93000 EKG 12-LEAD: ICD-10-PCS | Mod: S$GLB,,, | Performed by: INTERNAL MEDICINE

## 2023-10-05 PROCEDURE — 1101F PR PT FALLS ASSESS DOC 0-1 FALLS W/OUT INJ PAST YR: ICD-10-PCS | Mod: CPTII,S$GLB,, | Performed by: INTERNAL MEDICINE

## 2023-10-05 PROCEDURE — 3288F PR FALLS RISK ASSESSMENT DOCUMENTED: ICD-10-PCS | Mod: CPTII,S$GLB,, | Performed by: INTERNAL MEDICINE

## 2023-10-05 PROCEDURE — 99215 PR OFFICE/OUTPT VISIT, EST, LEVL V, 40-54 MIN: ICD-10-PCS | Mod: S$GLB,,, | Performed by: INTERNAL MEDICINE

## 2023-10-05 PROCEDURE — 1159F MED LIST DOCD IN RCRD: CPT | Mod: CPTII,S$GLB,, | Performed by: INTERNAL MEDICINE

## 2023-10-05 PROCEDURE — 1101F PT FALLS ASSESS-DOCD LE1/YR: CPT | Mod: CPTII,S$GLB,, | Performed by: INTERNAL MEDICINE

## 2023-10-05 RX ORDER — LISINOPRIL 40 MG/1
40 TABLET ORAL DAILY
Qty: 90 TABLET | Refills: 3 | Status: SHIPPED | OUTPATIENT
Start: 2023-10-05

## 2023-10-05 RX ORDER — DOCOSANOL 100 MG/G
1 CREAM TOPICAL DAILY
Qty: 2 G | Refills: 0 | Status: SHIPPED | OUTPATIENT
Start: 2023-10-05

## 2023-10-05 NOTE — TELEPHONE ENCOUNTER
----- Message from Stefanie Sousa LPN sent at 10/5/2023  4:04 PM CDT -----  Regarding: cream RX  Pt came in today and asked about her rx for docosanol cream. Called [facility] and they stated they never received the rx. Can you resend it?     Thanks,     Stefanie Sousa LPN

## 2023-10-05 NOTE — PROGRESS NOTES
Subjective:    Patient ID:  Yisel العلي is a 89 y.o. female who presents for evaluation of Annual Exam    for atypical CP and chronic dizziness, refuse statin due to muscle pain, did not start Zetia, dysarthria for 5 minutes,   Came on own  PCP: Dr. Martinez, no recent labs, see q 6 weeks  Gyn: Nikolai Simeon MD in [facility] cardiologist: Dr. Celeste, last seen in 3/2018  Pain: Madi Call MD  Physical M&R: Alix Rivera, DO  Lives alone, no pet, worry about tripping with poor balance for 5 years. Have moved to a big house in [facility], able to walk inside with walker   Daughter, Vandana, RN in [facility], do not have POA   SonDavid, in [place],  do have POA, not medical  Retire teacher, [employer], play competitive bridge once a week    SDOH: Difficult historian with memory difficulties. Here alone, daughter have to work  Health literacy: Medium  Vaccinations: Completed COVID, had infection, mild    Activities: ADL's only, no regular exercise, limited by chronic dizziness but no falling, used to swim daily until COVID started.  Nicotine: Never  Alcohol: 1-2 beer /month, max 1 per day, now once or twice a month.  Illicit drugs: Denies, on Rx Valium but use only once in 6 months.  Cardiac symptoms: still with atypical CP  Home BP: Yes - 135 to 140  Medication compliance: No, never started Zetia, did not proceed with Holter and CT head due to cost.  Diet: regular  Caffeine: 2-3 cups /day  Labs: 11/08/2018 & 04/12/2019: No Troponin and TSH; .0 not on Rx, own preference; A1C 5.6%; Sodium 135L; K+ 4.5; GFR 58L; Glucose 99; WBC 6.9; Hemoglobin 13.2; High Sensitivity 0.30  No TSH, Troponin, BNP:  .2H not on Rx;  A1C 5.6;  Sodium 135L;  K+ 4.4;  Glucose 136H;  GFR 58.5l;  WBC 5.34;  Hemoglobin 13.6;    Lab Results   Component Value Date    TSH 1.750 06/10/2021        Lab Results   Component Value Date    LABA1C 6.1 (H) 01/23/2015    HGBA1C 5.6 01/07/2020        Lab Results   Component Value Date    WBC 6.24 04/08/2022    HGB 14.1 04/08/2022    HCT 40.8 04/08/2022    MCV 93 04/08/2022     04/08/2022       CMP  Sodium   Date Value Ref Range Status   11/03/2022 134 (L) 136 - 145 mmol/L Final     Potassium   Date Value Ref Range Status   11/03/2022 4.1 3.5 - 5.1 mmol/L Final     Chloride   Date Value Ref Range Status   11/03/2022 100 95 - 110 mmol/L Final     CO2   Date Value Ref Range Status   11/03/2022 26 23 - 29 mmol/L Final     Glucose   Date Value Ref Range Status   11/03/2022 101 70 - 110 mg/dL Final     BUN   Date Value Ref Range Status   11/03/2022 16 8 - 23 mg/dL Final     Creatinine   Date Value Ref Range Status   11/03/2022 1.0 0.5 - 1.4 mg/dL Final     Calcium   Date Value Ref Range Status   11/03/2022 11.1 (H) 8.7 - 10.5 mg/dL Final     Total Protein   Date Value Ref Range Status   04/08/2022 6.9 6.0 - 8.4 g/dL Final     Albumin   Date Value Ref Range Status   04/08/2022 4.0 3.5 - 5.2 g/dL Final     Total Bilirubin   Date Value Ref Range Status   04/08/2022 0.6 0.1 - 1.0 mg/dL Final     Comment:     For infants and newborns, interpretation of results should be based  on gestational age, weight and in agreement with clinical  observations.    Premature Infant recommended reference ranges:  Up to 24 hours.............<8.0 mg/dL  Up to 48 hours............<12.0 mg/dL  3-5 days..................<15.0 mg/dL  6-29 days.................<15.0 mg/dL       Alkaline Phosphatase   Date Value Ref Range Status   04/08/2022 56 55 - 135 U/L Final     AST   Date Value Ref Range Status   04/08/2022 18 10 - 40 U/L Final     ALT   Date Value Ref Range Status   04/08/2022 17 10 - 44 U/L Final     Anion Gap   Date Value Ref Range Status   11/03/2022 8 8 - 16 mmol/L Final     eGFR if    Date Value Ref Range Status   04/08/2022 >60.0 >60 mL/min/1.73 m^2 Final     eGFR if non    Date Value Ref Range Status   04/08/2022 >60.0 >60 mL/min/1.73 m^2  "Final     Comment:     Calculation used to obtain the estimated glomerular filtration  rate (eGFR) is the CKD-EPI equation.        @labrcntip(troponini)@  No results found for: "BNP"}   Lab Results   Component Value Date    CHOL 197 02/01/2023    CHOL 228 (H) 04/08/2022    CHOL 233 (H) 10/06/2020     Lab Results   Component Value Date    HDL 54 02/01/2023    HDL 53 04/08/2022    HDL 52 10/06/2020     Lab Results   Component Value Date    LDLCALC 119.4 02/01/2023    LDLCALC 132.4 04/08/2022    LDLCALC 148.0 10/06/2020     Lab Results   Component Value Date    TRIG 118 02/01/2023    TRIG 213 (H) 04/08/2022    TRIG 165 (H) 10/06/2020     Lab Results   Component Value Date    CHOLHDL 27.4 02/01/2023    CHOLHDL 23.2 04/08/2022    CHOLHDL 22.3 10/06/2020      Last Carotid US: 10/2019  Last Echo: 4/2022  Last stress test: 11/08/2018  Cardiovascular angiogram: Can't remember   ECG: WAP, 53, otherwise normal  Fundoscopic exam: yearly, No retinopathy noted at present    In 11/2018:  Follow-up  Associated symptoms include coughing, joint swelling, neck pain, numbness and vertigo. Pertinent negatives include no chest pain, diaphoresis, fever, headaches, myalgias, nausea or weakness.   Hyperlipidemia  Pertinent negatives include no chest pain, focal weakness, myalgias or shortness of breath.   Hypertension  Associated symptoms include malaise/fatigue and neck pain. Pertinent negatives include no chest pain, headaches, orthopnea, palpitations, PND or shortness of breath.   Coronary Artery Disease  Symptoms include dizziness and leg swelling. Pertinent negatives include no chest pain, muscle weakness, palpitations, shortness of breath or weight gain. Risk factors include hyperlipidemia.       Dr. Celeste's note - "89 y.o. female who presents for Coronary Artery Disease (Labs); Hypertension; and Hyperlipidemia   DISCUSSED LABS AND GOALS, , NOT TAKING MEDS, , CR 1.06, NO CP OR OCC / SOB, NOT ACTIVE.   Recall CAD with " single stent placed maybe 15+ year ago. Do not recall any cardiac testing. ECG in sinus bradycardia with right axis deviation. Had smoked socially during college. 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 No recent repeat lipid panel. Report compliance with medications with restart of Pravastatin since visit in 3/2018. Not active now except for card playing, stopped walking in the poor for the last 3 weeks due to the water temperature. Was doing about an hour a day. Recall being on Ranexa for a number of years, only take one a day, do not see much benefit. Lot of worries about the not able to walk due to OA with CLBP with left sciatica, also concern of poor balance and falling. Willing to consider going to the gym.    In 9/2019, return for routine follow up. Still some concern about atypical CP for the past 12 years with some chest wall tenderness with hand pressure, also some recurrent dizziness also over the past 25 years. Worry about Carotid stenosis, post left side CEA.    DSE 11/2018 Left ventricle shows mild concentric hypertrophy.  Normal central venous pressure (3 mm Hg).  The estimated PA systolic pressure is 13.11 mm Hg  Left ventricle ejection fraction is increased (hyperdynamic) at 73%  Normal LV diastolic function.  RV systolic function is normal.  There were no arrhythmias during stress.  The EKG portion of this study is negative for myocardial ischemia.  The patient reported no symptoms during the stress test.  No Echo evidence for ischemia    In 3/2020, here for a rushed appointment have appointment [facility]. No change in cardiac status, more active with walking in the pool    In 10/2020, here alone for 6-months review. Difficult problem with clearly cognitive impairment. Some SOB but rare.    In 4/2021, returned by her self for follow up. Significant cognitive impairment as before. Been off oxybutyrin for over 2 months due to concern of cognitive problem. No heart worries,  continue with atypical precordial CP.     In 10/2021, return for 6-months review, still alone. No heart problem. Still did no started Zetia. Able to play bridge regularly and more active in the swimming pool. Would like to see doctor for OA.     In 4/2022, return for review, still lots of pains, now under the care of Madi Call MD. No heart issues.    In 11/2022, return with concern of possible TIA. In the car and could not get the word out. Lasted about 5 minutes, daughter, RN checked VS and felt due to dry mouth but friend is concern for TIA.     Echo 4/2022 - The left ventricle is normal in size with concentric remodeling and normal systolic function.  The estimated ejection fraction is 64%.  Indeterminate left ventricular diastolic function.  The left ventricular global longitudinal strain is -14%. Reduced.  Normal right ventricular size with normal right ventricular systolic function.  Normal central venous pressure (3 mmHg).  The estimated PA systolic pressure is 12 mmHg.  Small pericardial effusion.    Carotid US 10/2019 - No ultrasound evidence to suggest a hemodynamically significant carotid bifurcation stenosis.    Antegrade flow within the vertebral arteries.     HPI comments: in 10/2023, returned for annual review. Did not proceed with testing due to cost of >$400. Still report CP and chronic dizziness with chronic weakness but remain very sedentary.     Review of Systems   Constitutional: Positive for malaise/fatigue and weight loss. Negative for diaphoresis, fever, night sweats and weight gain.        Refused weighing   HENT:  Positive for hearing loss and stridor. Negative for nosebleeds and tinnitus.    Eyes:  Positive for discharge. Negative for visual disturbance.   Cardiovascular:  Positive for dyspnea on exertion and leg swelling. Negative for chest pain, claudication, cyanosis, irregular heartbeat, near-syncope, orthopnea, palpitations and paroxysmal nocturnal dyspnea.   Respiratory:   "Positive for cough, sleep disturbances due to breathing and sputum production. Negative for shortness of breath, snoring and wheezing.         [place] score 11 today a 10 today, awaken frequently for nocturia x 3. Refuse sleep study. Takes up to 2 naps a day.   Endocrine: Positive for cold intolerance and polyuria. Negative for polydipsia.   Hematologic/Lymphatic: Does not bruise/bleed easily.   Skin:  Positive for dry skin and itching. Negative for color change, flushing, nail changes, poor wound healing and suspicious lesions.   Musculoskeletal:  Positive for arthritis, back pain, gout, joint swelling, muscle cramps, neck pain and stiffness. Negative for falls, joint pain, muscle weakness and myalgias.   Gastrointestinal:  Positive for constipation. Negative for heartburn, hematemesis, hematochezia, melena and nausea.   Genitourinary:  Positive for bladder incontinence, nocturia and urgency.   Neurological:  Positive for disturbances in coordination, excessive daytime sleepiness, dizziness, light-headedness, loss of balance, numbness, paresthesias and vertigo. Negative for focal weakness, headaches and weakness.   Psychiatric/Behavioral:  Positive for memory loss. Negative for depression and substance abuse. The patient does not have insomnia and is not nervous/anxious.         Objective:    Physical Exam  Constitutional:       Appearance: She is well-developed.      Comments: RA O2 97%   HENT:      Head: Normocephalic.   Eyes:      Conjunctiva/sclera: Conjunctivae normal.      Pupils: Pupils are equal, round, and reactive to light.   Neck:      Thyroid: No thyromegaly.      Vascular: No JVD.      Comments: Circumference 14"  Cardiovascular:      Rate and Rhythm: Normal rate. Rhythm irregular.      Pulses: Intact distal pulses.           Carotid pulses are 2+ on the right side with bruit and 2+ on the left side.       Radial pulses are 2+ on the right side and 2+ on the left side.        Dorsalis pedis pulses are " "1+ on the right side and 1+ on the left side.        Posterior tibial pulses are 1+ on the right side and 1+ on the left side.      Heart sounds: Heart sounds are distant. Murmur heard.      Harsh midsystolic murmur is present with a grade of 2/6 at the upper right sternal border radiating to the neck.      No friction rub. No gallop.      Comments: Bilateral superficial varicose vein.  Pulmonary:      Effort: Pulmonary effort is normal.      Breath sounds: Normal breath sounds. No rales.   Chest:      Chest wall: No tenderness.   Abdominal:      General: Bowel sounds are normal.      Palpations: Abdomen is soft.      Tenderness: There is no abdominal tenderness.      Comments: Waist 46" down to 43.5", hip 45"   Musculoskeletal:         General: Normal range of motion.      Cervical back: Normal range of motion and neck supple.   Lymphadenopathy:      Cervical: No cervical adenopathy.   Skin:     General: Skin is warm and dry.      Findings: No rash.      Comments: Pin to light touch of both LE.   Neurological:      Mental Status: She is alert and oriented to person, place, and time.           Assessment:       1. Coronary artery disease of native artery of native heart with stable angina pectoris    2. HTN (hypertension), benign    3. Sedentary lifestyle    4. Atypical chest pain, onset 2007    5. Bradycardia    6. 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 Frail elderly    8. Dizziness, onset 1985    9. Statin intolerance    10. Abdominal obesity    11. Excessive daytime sleepiness, refuse sleep study    12. Mixed conductive and sensorineural hearing loss of both ears    13. Hypertensive heart disease without heart failure    14. Status post coronary artery stent placement    15. CKD (chronic kidney disease), stage III    16. History of left-sided carotid endarterectomy         Plan:       Yisel was seen today for annual exam.    Diagnoses and all orders for this visit:    Coronary artery disease of " native artery of native heart with stable angina pectoris  -     IN OFFICE EKG 12-LEAD (to Muse)  -     lisinopriL (PRINIVIL,ZESTRIL) 40 MG tablet; Take 1 tablet (40 mg total) by mouth once daily.    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    Status post coronary artery stent placement  -     lisinopriL (PRINIVIL,ZESTRIL) 40 MG tablet; Take 1 tablet (40 mg total) by mouth once daily.    CKD (chronic kidney disease), stage III  -     lisinopriL (PRINIVIL,ZESTRIL) 40 MG tablet; Take 1 tablet (40 mg total) by mouth once daily.    History of left-sided carotid endarterectomy  -     lisinopriL (PRINIVIL,ZESTRIL) 40 MG tablet; Take 1 tablet (40 mg total) by mouth once daily.      - All medical issues reviewed, decline any changes.   - Warning signs of MI and stroke given, would chew 2-4 low-dose ASA (81 mg) if symptom last more than 5 minutes and call 911.  - Need to remain well hydrated but avoid drinking within 4 hours of bedtime. Recommend at least 90 oz of fluid daily per IOM (institute of Medicine) suggestion.   - CV status and all medications reviewed, patient acknowledge good understanding.  - Instruction for Mediterranean diet and heart healthy exercise given.  - Recommend healthy living: moderate alcohol, healthy diet and regular exercise aiming for fitness, restorative sleep and weight control  - Discussed healthy alcohol daily limit of 0.5 oz of pure alcohol in any 24 hours (roughly one 12-oz beers, 5 oz of wine  (8%-12% alcohol), or 0.75 oz (half a shot) of liquor (80 proof)), can not save up.   - Encourage activities as much as tolerated. Any activity is better than none!  - Check home blood pressure, 2 days weekly, do 2 readings within 5 minutes in AM and PM, keep log for review.  - Weigh twice weekly, try to lose 1-2 lbs per week  - Highly recommend 30-60 minutes of exercise daily, can have Sunday off, with 2-3 sessions of muscle strengthening weekly. A  would be very helpful.  - Highly recommend Yoga, Macho-Chi and or meditation  - Recommend at least annual cardiovascular evaluation in view of her significant risk factors. Patient's preference  - Should have family member attend every doctor visit if at all possible. Bring all active medication in a bag for review.        Patient Active Problem List   Diagnosis    Coronary artery disease of native artery of native heart with stable angina pectoris    Elevated fasting glucose    Hypercalcemia    Mixed hyperlipidemia, baseline LDL not available    HTN (hypertension), benign    Rhinitis    Tear of retina without detachment - Right Eye    EPIPHORA    Dry eyes    Pseudophakia - Both Eyes    Vitamin D deficiency    Anemia    Difficulty walking    Lower extremity pain    Back pain    Nephrolithiasis    Hypertensive heart disease without heart failure    Anxiety    Stress    Hyponatremia    Class 1 obesity due to excess calories with serious comorbidity and body mass index (BMI) of 32.0 to 32.9 in adult    Memory difficulties    Poor balance, onset 2014    Status post coronary artery stent placement    BMI 33.0-33.9,adult    Abdominal obesity    Sedentary lifestyle    Chronic bilateral low back pain with left-sided sciatica    Excessive daytime sleepiness, refuse sleep study    Hyperlipidemia    CKD (chronic kidney disease), stage III    Osteoarthritis of multiple joints    Mixed conductive and sensorineural hearing loss of both ears    Atypical chest pain, onset  2007    History of left-sided carotid endarterectomy    Dizziness, onset 1985    Bruit of right carotid artery    Gouty tophi    Statin intolerance    Generalized body aches, onset 2010    Cognitive dysfunction    Personal history of noncompliance with medical treatment, presenting hazards to health    Prerenal azotemia    Bradycardia    Chronic pain syndrome    Neuropathy    Burning sensation of lower extremity    Frail elderly    Idiopathic peripheral neuropathy    Aortic heart murmur    Impaired functional mobility, balance, gait, and endurance    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 Greater than 50% of the time was spent in counseling and coordination of care. The above assessment and plan have been discussed at length. Referring provider's note reviewed. Labs and procedure over the last 6 months reviewed. Problem List updated. Asked to bring in all active medications / pills bottles with next visit. Will send note to referring / PCP.

## 2023-11-09 ENCOUNTER — OFFICE VISIT (OUTPATIENT)
Dept: FAMILY MEDICINE | Facility: CLINIC | Age: 88
End: 2023-11-09
Payer: MEDICARE

## 2023-11-09 VITALS
WEIGHT: 171 LBS | BODY MASS INDEX: 29.19 KG/M2 | HEIGHT: 64 IN | RESPIRATION RATE: 16 BRPM | SYSTOLIC BLOOD PRESSURE: 138 MMHG | DIASTOLIC BLOOD PRESSURE: 72 MMHG | HEART RATE: 64 BPM | OXYGEN SATURATION: 98 %

## 2023-11-09 DIAGNOSIS — E83.52 HYPERCALCEMIA: ICD-10-CM

## 2023-11-09 DIAGNOSIS — E53.8 B12 DEFICIENCY: ICD-10-CM

## 2023-11-09 DIAGNOSIS — E55.9 VITAMIN D DEFICIENCY: ICD-10-CM

## 2023-11-09 DIAGNOSIS — M79.605 BILATERAL LEG PAIN: ICD-10-CM

## 2023-11-09 DIAGNOSIS — M79.604 BILATERAL LEG PAIN: ICD-10-CM

## 2023-11-09 DIAGNOSIS — I10 HTN (HYPERTENSION), BENIGN: Primary | ICD-10-CM

## 2023-11-09 DIAGNOSIS — G62.9 NEUROPATHY: ICD-10-CM

## 2023-11-09 PROCEDURE — 1160F PR REVIEW ALL MEDS BY PRESCRIBER/CLIN PHARMACIST DOCUMENTED: ICD-10-PCS | Mod: CPTII,S$GLB,, | Performed by: FAMILY MEDICINE

## 2023-11-09 PROCEDURE — 1160F RVW MEDS BY RX/DR IN RCRD: CPT | Mod: CPTII,S$GLB,, | Performed by: FAMILY MEDICINE

## 2023-11-09 PROCEDURE — 99214 OFFICE O/P EST MOD 30 MIN: CPT | Mod: S$GLB,,, | Performed by: FAMILY MEDICINE

## 2023-11-09 PROCEDURE — 1159F MED LIST DOCD IN RCRD: CPT | Mod: CPTII,S$GLB,, | Performed by: FAMILY MEDICINE

## 2023-11-09 PROCEDURE — 99999 PR PBB SHADOW E&M-EST. PATIENT-LVL IV: CPT | Mod: PBBFAC,,, | Performed by: FAMILY MEDICINE

## 2023-11-09 PROCEDURE — 1159F PR MEDICATION LIST DOCUMENTED IN MEDICAL RECORD: ICD-10-PCS | Mod: CPTII,S$GLB,, | Performed by: FAMILY MEDICINE

## 2023-11-09 PROCEDURE — 99999 PR PBB SHADOW E&M-EST. PATIENT-LVL IV: ICD-10-PCS | Mod: PBBFAC,,, | Performed by: FAMILY MEDICINE

## 2023-11-09 PROCEDURE — 99214 PR OFFICE/OUTPT VISIT, EST, LEVL IV, 30-39 MIN: ICD-10-PCS | Mod: S$GLB,,, | Performed by: FAMILY MEDICINE

## 2023-11-09 RX ORDER — INFLUENZA A VIRUS A/VICTORIA/4897/2022 IVR-238 (H1N1) ANTIGEN (FORMALDEHYDE INACTIVATED), INFLUENZA A VIRUS A/DARWIN/9/2021 SAN-010 (H3N2) ANTIGEN (FORMALDEHYDE INACTIVATED), INFLUENZA B VIRUS B/PHUKET/3073/2013 ANTIGEN (FORMALDEHYDE INACTIVATED), AND INFLUENZA B VIRUS B/MICHIGAN/01/2021 ANTIGEN (FORMALDEHYDE INACTIVATED) 60; 60; 60; 60 UG/.7ML; UG/.7ML; UG/.7ML; UG/.7ML
INJECTION, SUSPENSION INTRAMUSCULAR
COMMUNITY
Start: 2023-09-13

## 2023-11-09 RX ORDER — RESPIRATORY SYNCYTIAL VISUS VACCINE RECOMBINANT, ADJUVANTED 120MCG/0.5
KIT INTRAMUSCULAR
COMMUNITY
Start: 2023-09-13

## 2023-11-09 NOTE — PROGRESS NOTES
Subjective:       Patient ID: Yisel العلي is a 89 y.o. female.    Chief Complaint: Follow-up (Patient is here to follow up. She has questions about her legs and the pains she's having and concerns of a blood clot. She would also like a referral to address the neuropathy in her hands. )    BP Readings from Last 3 Encounters:  11/09/23 : 138/72  11/01/23 : (!) 160/64  10/05/23 : 97/63  Hypertension Medications     lisinopriL (PRINIVIL,ZESTRIL) 40 MG tablet Take 1 tablet (40 mg total) by   mouth once daily.       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 Needs refills.     She has chronic hand and foot pain. Wondering if she has blood clots in her legs. She has pain and occasional swelling and she is concerned.     She has chronic urinary incontinence. She has been using pads and diapers         Dizziness: no fever.    Review of Systems   Constitutional:  Negative for fever.   Neurological:  Positive for dizziness.         Objective:      Physical Exam  Vitals and nursing note reviewed.   Constitutional:       General: She is not in acute distress.     Appearance: She is not ill-appearing.   HENT:      Nose: No congestion.   Cardiovascular:      Rate and Rhythm: Normal rate and regular rhythm.      Heart sounds: Murmur (holosystolic) heard.   Pulmonary:      Effort: Pulmonary effort is normal.      Breath sounds: Normal breath sounds. No wheezing.   Skin:     General: Skin is warm and dry.      Findings: No rash.   Neurological:      Mental Status: She is alert.   Psychiatric:         Mood and Affect: Mood normal.         Behavior: Behavior normal.         Assessment:       1. HTN (hypertension), benign    2. Hypercalcemia    3. Neuropathy    4. Vitamin D deficiency    5. B12 deficiency    6. Bilateral leg pain        Plan:       Problem List Items Addressed This Visit          Neuro    Neuropathy    Relevant Orders    Vitamin B12    Vitamin D       Cardiac/Vascular    HTN  (hypertension), benign - Primary    Relevant Orders    Comprehensive Metabolic Panel    TSH       Renal/    Hypercalcemia    Relevant Orders    Comprehensive Metabolic Panel       Endocrine    Vitamin D deficiency    Relevant Orders    Vitamin D     Other Visit Diagnoses       B12 deficiency        Relevant Orders    Vitamin B12    Bilateral leg pain        Relevant Orders    US Lower Extremity Veins Left    US Lower Extremity Veins Right            Will be sure we find no additional contributory measures to her neuropathy. Imaging of bilateral lower extremities as above.   Continue current medications as listed above.   No refill needed today.

## 2024-03-21 ENCOUNTER — HOSPITAL ENCOUNTER (EMERGENCY)
Facility: HOSPITAL | Age: 89
Discharge: HOME | End: 2024-03-21
Attending: EMERGENCY MEDICINE
Payer: MEDICARE

## 2024-03-21 VITALS
TEMPERATURE: 98 F | HEIGHT: 64 IN | SYSTOLIC BLOOD PRESSURE: 176 MMHG | RESPIRATION RATE: 18 BRPM | BODY MASS INDEX: 29.88 KG/M2 | DIASTOLIC BLOOD PRESSURE: 77 MMHG | OXYGEN SATURATION: 98 % | WEIGHT: 175 LBS | HEART RATE: 53 BPM

## 2024-03-21 DIAGNOSIS — S01.01XA LACERATION OF SCALP WITHOUT FOREIGN BODY, INITIAL ENCOUNTER: ICD-10-CM
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
## 2024-03-21 LAB
ALBUMIN SERPL BCP-MCNC: 4 G/DL (ref 3.5–5.2)
ALP SERPL-CCNC: 55 U/L (ref 55–135)
ALT SERPL W/O P-5'-P-CCNC: 9 U/L (ref 10–44)
ANION GAP SERPL CALC-SCNC: 14 MMOL/L (ref 8–16)
AST SERPL-CCNC: 16 U/L (ref 10–40)
BASOPHILS # BLD AUTO: 0.07 K/UL (ref 0–0.2)
BASOPHILS NFR BLD: 1 % (ref 0–1.9)
BILIRUB SERPL-MCNC: 0.6 MG/DL (ref 0.1–1)
BUN SERPL-MCNC: 27 MG/DL (ref 8–23)
CALCIUM SERPL-MCNC: 10.9 MG/DL (ref 8.7–10.5)
CHLORIDE SERPL-SCNC: 107 MMOL/L (ref 95–110)
CO2 SERPL-SCNC: 19 MMOL/L (ref 23–29)
CREAT SERPL-MCNC: 1.2 MG/DL (ref 0.5–1.4)
DIFFERENTIAL METHOD BLD: ABNORMAL
EOSINOPHIL # BLD AUTO: 0.2 K/UL (ref 0–0.5)
EOSINOPHIL NFR BLD: 2.5 % (ref 0–8)
ERYTHROCYTE [DISTWIDTH] IN BLOOD BY AUTOMATED COUNT: 11.9 % (ref 11.5–14.5)
EST. GFR  (NO RACE VARIABLE): 43 ML/MIN/1.73 M^2
GLUCOSE SERPL-MCNC: 99 MG/DL (ref 70–110)
HCT VFR BLD AUTO: 40.3 % (ref 37–48.5)
HGB BLD-MCNC: 13.9 G/DL (ref 12–16)
IMM GRANULOCYTES # BLD AUTO: 0.06 K/UL (ref 0–0.04)
IMM GRANULOCYTES NFR BLD AUTO: 0.9 % (ref 0–0.5)
LYMPHOCYTES # BLD AUTO: 2.6 K/UL (ref 1–4.8)
LYMPHOCYTES NFR BLD: 37.6 % (ref 18–48)
MCH RBC QN AUTO: 31.3 PG (ref 27–31)
MCHC RBC AUTO-ENTMCNC: 34.5 G/DL (ref 32–36)
MCV RBC AUTO: 91 FL (ref 82–98)
MONOCYTES # BLD AUTO: 0.5 K/UL (ref 0.3–1)
MONOCYTES NFR BLD: 6.9 % (ref 4–15)
NEUTROPHILS # BLD AUTO: 3.5 K/UL (ref 1.8–7.7)
NEUTROPHILS NFR BLD: 51.1 % (ref 38–73)
NRBC BLD-RTO: 0 /100 WBC
PLATELET # BLD AUTO: 262 K/UL (ref 150–450)
PMV BLD AUTO: 8.4 FL (ref 9.2–12.9)
POTASSIUM SERPL-SCNC: 4.3 MMOL/L (ref 3.5–5.1)
PROT SERPL-MCNC: 6.5 G/DL (ref 6–8.4)
RBC # BLD AUTO: 4.44 M/UL (ref 4–5.4)
SODIUM SERPL-SCNC: 140 MMOL/L (ref 136–145)
WBC # BLD AUTO: 6.92 K/UL (ref 3.9–12.7)

## 2024-03-21 PROCEDURE — 72125 CT NECK SPINE W/O DYE: CPT | Mod: TC

## 2024-03-21 PROCEDURE — 72125 CT NECK SPINE W/O DYE: CPT | Mod: 26,,, | Performed by: RADIOLOGY

## 2024-03-21 PROCEDURE — 12001 RPR S/N/AX/GEN/TRNK 2.5CM/<: CPT

## 2024-03-21 PROCEDURE — 70450 CT HEAD/BRAIN W/O DYE: CPT | Mod: 26,,, | Performed by: RADIOLOGY

## 2024-03-21 PROCEDURE — 85025 COMPLETE CBC W/AUTO DIFF WBC: CPT | Performed by: EMERGENCY MEDICINE

## 2024-03-21 PROCEDURE — 80053 COMPREHEN METABOLIC PANEL: CPT | Performed by: EMERGENCY MEDICINE

## 2024-03-21 PROCEDURE — 99284 EMERGENCY DEPT VISIT MOD MDM: CPT | Mod: 25

## 2024-03-21 PROCEDURE — 70450 CT HEAD/BRAIN W/O DYE: CPT | Mod: TC

## 2024-03-21 NOTE — DISCHARGE INSTRUCTIONS
You had 3 staples placed today.  These will need to be removed in approximately 5 days.  Please be evaluated by a medical professional and if they deem that the wound is healed well enough at that time they will have them removed.  You may shower and clean yourself normally please do not rub or scratch at the area.  Watch for signs of infection this includes increased redness, swelling if what appears to be pus begins draining from the wound seek medical attention immediately.  You may use Motrin or Tylenol for headache pain.

## 2024-03-21 NOTE — ED PROVIDER NOTES
Encounter Date: 3/21/2024       History     Chief Complaint   Patient presents with    Fall     89-year-old female presents via EMS from home after a mechanical fall.  Patient normally uses a walker to ambulate somehow lost her balance and fell back striking the back of her head.  No loss of consciousness.  Patient states she believes she laid on the floor for about an hour prior to EMS arrival.  She is awake alert and oriented.  Complaining of a headache and minor left knee pain which she states does not feel like it is broken.  Denies being on blood thinners.    The history is provided by the patient, the EMS personnel and medical records. No  was used.     Review of patient's allergies indicates:   Allergen Reactions    Codeine Other (See Comments)     dosent remember reaction;maybe nausea     Past Medical History:   Diagnosis Date    Anticoagulant long-term use     Arthritis     CAD (coronary artery disease)     Cancer     skin    Cataract     CKD (chronic kidney disease), stage III     HEARING LOSS     Hematoma complicating a procedure 1213    ant abd wall    [place] (hard of hearing)     BILAT AIDS    Hyperlipidemia     Hypertension     Meniere disease     Pneumonia     Retinal detachment     not sure which eye    Vertigo     Wears glasses      Past Surgical History:   Procedure Laterality Date    carotid surgery      left endarterectomy    CATARACT EXTRACTION      ou    CORONARY STENT PLACEMENT      x1    EPIDURAL STEROID INJECTION N/A 5/4/2022    Procedure: Injection, Steroid, Epidural L4-L5;  Surgeon: Madi Call MD;  Location: [facility] OR;  Service: Pain Management;  Laterality: N/A;    HYSTERECTOMY      KNEE SURGERY Right     PARTIAL NEPHRECTOMY  1013    benign left kidney neoplasm    RETINAL DETACHMENT SURGERY       Family History   Problem Relation Age of Onset    Cancer Father         ?    Heart failure Father     Amblyopia Neg Hx     Blindness Neg Hx     Cataracts Neg Hx     Diabetes  Neg Hx     Glaucoma Neg Hx     Hypertension Neg Hx     Macular degeneration Neg Hx     Retinal detachment Neg Hx     Strabismus Neg Hx     Stroke Neg Hx     Thyroid disease Neg Hx      Social History     Tobacco Use    Smoking status: Former     Passive exposure: Never    Smokeless tobacco: Never   Substance Use Topics    Alcohol use: Yes     Comment: occasional    Drug use: No     Review of Systems   Constitutional:  Negative for fever.   HENT:  Negative for sore throat.    Respiratory:  Negative for shortness of breath.    Cardiovascular:  Negative for chest pain.   Gastrointestinal:  Negative for nausea.   Genitourinary:  Negative for dysuria.   Musculoskeletal:  Negative for back pain.   Skin:  Negative for rash.   Neurological:  Positive for headaches. Negative for weakness.   Hematological:  Does not bruise/bleed easily.   All other systems reviewed and are negative.      Physical Exam     Initial Vitals [03/21/24 0156]   BP Pulse Resp Temp SpO2   (!) 190/67 (!) 51 18 98 °F (36.7 °C) 99 %      MAP       --         Physical Exam    Nursing note and vitals reviewed.  Constitutional: She appears well-developed and well-nourished.   HENT:   Proximally 2 cm x 3 cm raised contusion to the left parietal occipital region.  Dried blood around it but no active bleeding, no crepitus no palpable skull deformity.   Eyes: EOM are normal. Pupils are equal, round, and reactive to light.   Neck:   No midline bony neck tenderness, no obvious deformities, full range of motion without pain   Cardiovascular:  Normal rate and regular rhythm.           Pulmonary/Chest: Breath sounds normal. No respiratory distress.   Musculoskeletal:         General: Normal range of motion.      Comments: Minor abrasion to the left kneecap without obvious deformity swelling     Neurological: She is alert and oriented to person, place, and time. GCS score is 15. GCS eye subscore is 4. GCS verbal subscore is 5. GCS motor subscore is 6.   Skin: Skin is  warm. Capillary refill takes less than 2 seconds.   Psychiatric: She has a normal mood and affect. Thought content normal.         ED Course   Lac Repair    Date/Time: 3/21/2024 3:11 AM    Performed by: Rox Kyle MD  Authorized by: Rox Kyle MD    Consent:     Consent obtained:  Verbal    Consent given by:  Patient    Risks, benefits, and alternatives were discussed: yes      Risks discussed:  Infection, pain, poor cosmetic result and poor wound healing    Alternatives discussed:  No treatment and delayed treatment  Universal protocol:     Patient identity confirmed:  Verbally with patient, arm band and provided demographic data  Anesthesia:     Anesthesia method:  Local infiltration    Local anesthetic:  Lidocaine 1% WITH epi  Laceration details:     Location:  Scalp    Scalp location:  L parietal    Length (cm):  2    Depth (mm):  1  Exploration:     Limited defect created (wound extended): no      Hemostasis achieved with:  Direct pressure    Imaging obtained comment:  CT    Imaging outcome: foreign body not noted      Wound extent: no areolar tissue violation noted, no fascia violation noted, no foreign bodies/material noted and no muscle damage noted      Contaminated: no    Treatment:     Area cleansed with:  Soap and water    Amount of cleaning:  Standard    Visualized foreign bodies/material removed: no      Debridement:  None    Undermining:  None    Scar revision: no    Skin repair:     Repair method:  Staples    Number of staples:  3  Approximation:     Approximation:  Loose  Repair type:     Repair type:  Simple  Post-procedure details:     Dressing:  Non-adherent dressing    Procedure completion:  Tolerated well, no immediate complications    Labs Reviewed   CBC W/ AUTO DIFFERENTIAL - Abnormal; Notable for the following components:       Result Value    MCH 31.3 (*)     MPV 8.4 (*)     Immature Granulocytes 0.9 (*)     Immature Grans (Abs) 0.06 (*)     All other components within normal  limits   COMPREHENSIVE METABOLIC PANEL - Abnormal; Notable for the following components:    CO2 19 (*)     BUN 27 (*)     Calcium 10.9 (*)     ALT 9 (*)     eGFR 43.0 (*)     All other components within normal limits          Imaging Results              CT Cervical Spine Without Contrast (Final result)  Result time 03/21/24 08:29:45      Final result by Shin Casanova Jr., MD (03/21/24 08:29:45)                   Impression:      No CT evidence of fracture or subluxation.  Pre-existing degenerative disc disease and spondylosis at C2-3, C5-6 and C6-7 with stenosis as described.      Electronically signed by: Shin Casanova MD  Date:    03/21/2024  Time:    08:29               Narrative:    EXAMINATION:  CT CERVICAL SPINE WITHOUT CONTRAST    CLINICAL HISTORY:  Neck trauma (Age >= 65y);    TECHNIQUE:  Low dose axial images, sagittal and coronal reformations were performed though the cervical spine.  Contrast was not administered.    COMPARISON:  Cervical spine x-rays of June 13, 2022.    FINDINGS:  The alignment is normal.  The vertebral bodies are intact without evidence of fracture or compression.  There is disc space narrowing and spondylosis identified at C5-6 and C6-7.  Degenerative disc disease is also seen at C2-3.  Degenerative changes are noted at the atlanto axial joint.  A fracture of the posterior elements are dens are not seen.  No subluxation is noted.    The asymmetric facet hypertrophy and spondylosis produces right neural foraminal stenosis at C2-3, bilateral neural foraminal stenosis at C3-4, left neural foraminal stenosis at C4-5, bilateral neural foraminal stenosis at C5-6 and mild left neural foraminal stenosis at C6-7.  Spinal canal stenosis or free fragments in the spinal canal are not seen.                                       CT Head Without Contrast (Final result)  Result time 03/21/24 08:26:23      Final result by Shin Casanova Jr., MD (03/21/24 08:26:23)                    Impression:      Scalp contusion in the posterior left parietal area without cranial fracture.  Mild brain atrophy with deep white matter ischemic changes.  No intracranial hemorrhage or hematoma is noted.      Electronically signed by: Shin Casanova MD  Date:    03/21/2024  Time:    08:26               Narrative:    EXAMINATION:  CT HEAD WITHOUT CONTRAST    CLINICAL HISTORY:  Head trauma, minor (Age >= 65y);    TECHNIQUE:  Low dose axial images were obtained through the head.  Coronal and sagittal reformations were also performed. Contrast was not administered.    COMPARISON:  None.    FINDINGS:  There is a scalp contusion in the posterior left parietal scalp.  An underlying cranial fracture is not seen.    The basal cisterns are clear and symmetric.  There is no mass effect or midline shift.  The ventricles are mildly enlarged as of the cerebral sulci and sylvian fissures consistent with brain atrophy.  Hypodensity of the central white matter is consistent with deep white matter ischemic changes.  Focal lesions of increased or decreased CT density consistent with tumor, edema, CVA or hemorrhage is not seen.  No intracranial hemorrhage or hematoma is noted.                                       Medications - No data to display  Medical Decision Making  Differential diagnosis for this patient includes contusion, head laceration, intracranial hemorrhage, skull fracture, concussion    Labs without significant abnormality.  CT scans of the head and neck without acute finding other than the scalp laceration which will be stapled.  Patient awake alert and oriented at her mental baseline.  We will arrange transportation back to the home.    Amount and/or Complexity of Data Reviewed  Labs: ordered.  Radiology: ordered.                                      Clinical Impression:  Final diagnoses:  [W19.XXXA] Fall, initial encounter (Primary)  [S01.01XA] Laceration of scalp without foreign body, initial encounter           ED Disposition Condition    Discharge Stable          ED Prescriptions    None       Follow-up Information    None          Rox Kyle MD  03/21/24 3276

## 2024-03-21 NOTE — ED NOTES
Pt to ed c/o fall. States she was using her walker to ambulate to the bathroom when she fell landing on her left knee and then hitting the back left of her head on a chair. Denies loc, denies blood thinners. Lac/hematoma noted to left posterior head. Bleeding controlled at this time. Pt is aaox3. Nadn. Resp eu. Bp elevated. Bradycardic on monitor. Side rails up x2

## 2024-03-21 NOTE — ED TRIAGE NOTES
C/o ground level fall. States she fell while holding onto her walker. Denies any LOC. C/o L knee pain and lac to back of head.

## 2024-03-22 ENCOUNTER — TELEPHONE (OUTPATIENT)
Dept: FAMILY MEDICINE | Facility: CLINIC | Age: 89
End: 2024-03-22
Payer: MEDICARE

## 2024-03-26 ENCOUNTER — OFFICE VISIT (OUTPATIENT)
Dept: FAMILY MEDICINE | Facility: CLINIC | Age: 89
End: 2024-03-26
Payer: MEDICARE

## 2024-03-26 VITALS
BODY MASS INDEX: 29.88 KG/M2 | SYSTOLIC BLOOD PRESSURE: 130 MMHG | HEART RATE: 55 BPM | HEIGHT: 64 IN | DIASTOLIC BLOOD PRESSURE: 80 MMHG | OXYGEN SATURATION: 97 % | WEIGHT: 175 LBS | RESPIRATION RATE: 16 BRPM

## 2024-03-26 DIAGNOSIS — S01.01XA SCALP LACERATION, INITIAL ENCOUNTER: ICD-10-CM
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
## 2024-03-26 PROCEDURE — 99212 OFFICE O/P EST SF 10 MIN: CPT | Mod: S$GLB,,, | Performed by: STUDENT IN AN ORGANIZED HEALTH CARE EDUCATION/TRAINING PROGRAM

## 2024-03-26 PROCEDURE — 1100F PTFALLS ASSESS-DOCD GE2>/YR: CPT | Mod: CPTII,S$GLB,, | Performed by: STUDENT IN AN ORGANIZED HEALTH CARE EDUCATION/TRAINING PROGRAM

## 2024-03-26 PROCEDURE — 99999 PR PBB SHADOW E&M-EST. PATIENT-LVL III: CPT | Mod: PBBFAC,,, | Performed by: STUDENT IN AN ORGANIZED HEALTH CARE EDUCATION/TRAINING PROGRAM

## 2024-03-26 PROCEDURE — 3288F FALL RISK ASSESSMENT DOCD: CPT | Mod: CPTII,S$GLB,, | Performed by: STUDENT IN AN ORGANIZED HEALTH CARE EDUCATION/TRAINING PROGRAM

## 2024-03-26 PROCEDURE — 1159F MED LIST DOCD IN RCRD: CPT | Mod: CPTII,S$GLB,, | Performed by: STUDENT IN AN ORGANIZED HEALTH CARE EDUCATION/TRAINING PROGRAM

## 2024-03-26 PROCEDURE — 1126F AMNT PAIN NOTED NONE PRSNT: CPT | Mod: CPTII,S$GLB,, | Performed by: STUDENT IN AN ORGANIZED HEALTH CARE EDUCATION/TRAINING PROGRAM

## 2024-03-26 NOTE — PROGRESS NOTES
Subjective:       Patient ID: Yisel العلي is a 89 y.o. female.    Chief Complaint: Suture / Staple Removal (Put in last Thursday in scalp)    Pt had a fall and hit her head onto furniture causing a scalp laceration  Three staples were placed.  Healing well and being well cared for.  No drainage.  Minimal tenderness.  Was told to get staples out around day 5      Three staples easily removed  Wound cleaned  No drainage, well approximated    CT neck-  No CT evidence of fracture or subluxation.  Pre-existing degenerative disc disease and spondylosis at C2-3, C5-6 and C6-7 with stenosis as described.    CT head-  Scalp contusion in the posterior left parietal area without cranial fracture.  Mild brain atrophy with deep white matter ischemic changes.  No intracranial hemorrhage or hematoma is noted.      Review of Systems   Integumentary:  Positive for wound.         Objective:      Physical Exam  Constitutional:       General: She is not in acute distress.     Appearance: Normal appearance. She is not ill-appearing.   HENT:      Head:     Eyes:      Conjunctiva/sclera: Conjunctivae normal.   Pulmonary:      Effort: Pulmonary effort is normal. No respiratory distress.   Musculoskeletal:      Right lower leg: No edema.      Left lower leg: No edema.   Skin:     General: Skin is warm and dry.   Neurological:      Mental Status: She is alert.      Gait: Gait abnormal (walking with a walker).   Psychiatric:         Mood and Affect: Mood normal.         Assessment:       1. Fall, initial encounter    2. Scalp laceration, initial encounter        Plan:       Problem List Items Addressed This Visit    None  Visit Diagnoses       Fall, initial encounter    -  Primary    tripped and hit scalp on furniture. no loc. mild dizziness improving    Scalp laceration, initial encounter        left lateral occipital region- 3 staples removed. laceration intact.  bandaging instructions given

## 2024-04-25 ENCOUNTER — TELEPHONE (OUTPATIENT)
Dept: FAMILY MEDICINE | Facility: CLINIC | Age: 89
End: 2024-04-25
Payer: MEDICARE

## 2024-04-25 DIAGNOSIS — R26.81 UNSTEADY GAIT WHEN WALKING: Primary | ICD-10-CM

## 2024-04-25 NOTE — TELEPHONE ENCOUNTER
----- Message from John Becerril sent at 4/25/2024  3:47 PM CDT -----  Contact: Self  Type:  Patient Requesting Referral    Who Called:  PT    Referral to What Specialty:  PT  Reason for Referral:  due to fall  Does the patient want the referral with a specific physician?:  Dr. Mujica in [place]  Is the specialist an [facility]  Patient Requesting a Call Back?:  Call  Best Call Back Number:  [phone]    Additional Information:

## 2024-05-03 ENCOUNTER — LAB VISIT (OUTPATIENT)
Dept: LAB | Facility: HOSPITAL | Age: 89
End: 2024-05-03
Attending: FAMILY MEDICINE
Payer: MEDICARE

## 2024-05-03 ENCOUNTER — OFFICE VISIT (OUTPATIENT)
Dept: FAMILY MEDICINE | Facility: CLINIC | Age: 89
End: 2024-05-03
Payer: MEDICARE

## 2024-05-03 VITALS
DIASTOLIC BLOOD PRESSURE: 70 MMHG | RESPIRATION RATE: 18 BRPM | SYSTOLIC BLOOD PRESSURE: 128 MMHG | WEIGHT: 175 LBS | HEIGHT: 64 IN | OXYGEN SATURATION: 99 % | HEART RATE: 50 BPM | BODY MASS INDEX: 29.88 KG/M2

## 2024-05-03 DIAGNOSIS — R73.03 PREDIABETES: ICD-10-CM

## 2024-05-03 DIAGNOSIS — E55.9 VITAMIN D DEFICIENCY: ICD-10-CM

## 2024-05-03 DIAGNOSIS — R26.9 ABNORMAL GAIT: ICD-10-CM

## 2024-05-03 DIAGNOSIS — E83.52 HYPERCALCEMIA: ICD-10-CM

## 2024-05-03 DIAGNOSIS — G62.9 NEUROPATHY: ICD-10-CM

## 2024-05-03 DIAGNOSIS — M1A.9XX1 GOUTY TOPHI: ICD-10-CM
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
## 2024-05-03 DIAGNOSIS — I10 HTN (HYPERTENSION), BENIGN: ICD-10-CM

## 2024-05-03 DIAGNOSIS — H90.6 MIXED CONDUCTIVE AND SENSORINEURAL HEARING LOSS OF BOTH EARS: ICD-10-CM

## 2024-05-03 DIAGNOSIS — Z99.89 WALKER AS AMBULATION AID: ICD-10-CM

## 2024-05-03 DIAGNOSIS — M10.9 GOUT, UNSPECIFIED CAUSE, UNSPECIFIED CHRONICITY, UNSPECIFIED SITE: ICD-10-CM

## 2024-05-03 DIAGNOSIS — E53.8 B12 DEFICIENCY: ICD-10-CM

## 2024-05-03 DIAGNOSIS — N18.31 STAGE 3A CHRONIC KIDNEY DISEASE: ICD-10-CM

## 2024-05-03 DIAGNOSIS — I25.118 CORONARY ARTERY DISEASE OF NATIVE ARTERY OF NATIVE HEART WITH STABLE ANGINA PECTORIS: ICD-10-CM

## 2024-05-03 DIAGNOSIS — R26.89 POOR BALANCE: ICD-10-CM

## 2024-05-03 LAB
ALBUMIN SERPL BCP-MCNC: 4.2 G/DL (ref 3.5–5.2)
ALP SERPL-CCNC: 58 U/L (ref 55–135)
ALT SERPL W/O P-5'-P-CCNC: 11 U/L (ref 10–44)
ANION GAP SERPL CALC-SCNC: 8 MMOL/L (ref 8–16)
AST SERPL-CCNC: 15 U/L (ref 10–40)
BILIRUB SERPL-MCNC: 0.3 MG/DL (ref 0.1–1)
BUN SERPL-MCNC: 23 MG/DL (ref 8–23)
CALCIUM SERPL-MCNC: 10.9 MG/DL (ref 8.7–10.5)
CHLORIDE SERPL-SCNC: 111 MMOL/L (ref 95–110)
CO2 SERPL-SCNC: 23 MMOL/L (ref 23–29)
CREAT SERPL-MCNC: 1.3 MG/DL (ref 0.5–1.4)
EST. GFR  (NO RACE VARIABLE): 39.1 ML/MIN/1.73 M^2
ESTIMATED AVG GLUCOSE: 108 MG/DL (ref 68–131)
GLUCOSE SERPL-MCNC: 111 MG/DL (ref 70–110)
HBA1C MFR BLD: 5.4 % (ref 4–5.6)
POTASSIUM SERPL-SCNC: 4.6 MMOL/L (ref 3.5–5.1)
PROT SERPL-MCNC: 7 G/DL (ref 6–8.4)
SODIUM SERPL-SCNC: 142 MMOL/L (ref 136–145)
TSH SERPL DL<=0.005 MIU/L-ACNC: 1.4 UIU/ML (ref 0.4–4)

## 2024-05-03 PROCEDURE — 82607 VITAMIN B-12: CPT | Performed by: FAMILY MEDICINE

## 2024-05-03 PROCEDURE — 84443 ASSAY THYROID STIM HORMONE: CPT | Performed by: FAMILY MEDICINE

## 2024-05-03 PROCEDURE — 83036 HEMOGLOBIN GLYCOSYLATED A1C: CPT | Performed by: FAMILY MEDICINE

## 2024-05-03 PROCEDURE — 99214 OFFICE O/P EST MOD 30 MIN: CPT | Mod: S$GLB,,, | Performed by: FAMILY MEDICINE

## 2024-05-03 PROCEDURE — 82306 VITAMIN D 25 HYDROXY: CPT | Performed by: FAMILY MEDICINE

## 2024-05-03 PROCEDURE — 80053 COMPREHEN METABOLIC PANEL: CPT | Performed by: FAMILY MEDICINE

## 2024-05-03 PROCEDURE — 1159F MED LIST DOCD IN RCRD: CPT | Mod: CPTII,S$GLB,, | Performed by: FAMILY MEDICINE

## 2024-05-03 PROCEDURE — 99999 PR PBB SHADOW E&M-EST. PATIENT-LVL IV: CPT | Mod: PBBFAC,,, | Performed by: FAMILY MEDICINE

## 2024-05-03 PROCEDURE — 36415 COLL VENOUS BLD VENIPUNCTURE: CPT | Performed by: FAMILY MEDICINE

## 2024-05-03 PROCEDURE — 1125F AMNT PAIN NOTED PAIN PRSNT: CPT | Mod: CPTII,S$GLB,, | Performed by: FAMILY MEDICINE

## 2024-05-03 RX ORDER — ERGOCALCIFEROL 1.25 MG/1
50000 CAPSULE ORAL
Qty: 4 CAPSULE | Refills: 11 | Status: SHIPPED | OUTPATIENT
Start: 2024-05-03

## 2024-05-03 RX ORDER — COLCHICINE 0.6 MG/1
TABLET ORAL
Qty: 9 TABLET | Refills: 1 | Status: SHIPPED | OUTPATIENT
Start: 2024-05-03

## 2024-05-03 RX ORDER — ALLOPURINOL 100 MG/1
100 TABLET ORAL DAILY
Qty: 90 TABLET | Refills: 3 | Status: SHIPPED | OUTPATIENT
Start: 2024-05-03

## 2024-05-03 NOTE — ASSESSMENT & PLAN NOTE
BMP  Lab Results   Component Value Date     03/21/2024    K 4.3 03/21/2024     03/21/2024    CO2 19 (L) 03/21/2024    BUN 27 (H) 03/21/2024    CREATININE 1.2 03/21/2024    CALCIUM 10.9 (H) 03/21/2024    ANIONGAP 14 03/21/2024    EGFRNORACEVR 43.0 (A) 03/21/2024   Chronic. Stable. Avoid nephrotoxic medications

## 2024-05-03 NOTE — PROGRESS NOTES
Subjective:       Patient ID: Yisel العلي is a 89 y.o. female.    Chief Complaint: Follow-up    Presents today for follow up of chronic conditions listed below. Has a few requests. She would like her PT ordered. She needs refill on her vitamin D and gout medications.     She has a sheet from [employer] to discuss today and we have addressed all issues. She has been provided with a goals of care packet.        .  Patient Active Problem List   Diagnosis    Coronary artery disease of native artery of native heart with stable angina pectoris    Elevated fasting glucose    Hypercalcemia    Mixed hyperlipidemia, baseline LDL not available    HTN (hypertension), benign    Rhinitis    Tear of retina without detachment - Right Eye    EPIPHORA    Dry eyes    Pseudophakia - Both Eyes    Vitamin D deficiency    Anemia    Difficulty walking    Lower extremity pain    Back pain    Nephrolithiasis    Hypertensive heart disease without heart failure    Anxiety    Stress    Hyponatremia    Class 1 obesity due to excess calories with serious comorbidity and body mass index (BMI) of 32.0 to 32.9 in adult    Memory difficulties    Poor balance, onset 2014    Status post coronary artery stent placement    BMI 33.0-33.9,adult    Abdominal obesity    Sedentary lifestyle    Chronic bilateral low back pain with left-sided sciatica    Excessive daytime sleepiness, refuse sleep study    Hyperlipidemia    Stage 3a chronic kidney disease    Osteoarthritis of multiple joints    Mixed conductive and sensorineural hearing loss of both ears    Atypical chest pain, onset 2007    History of left-sided carotid endarterectomy    Dizziness, onset 1985    Bruit of right carotid artery    Gouty tophi    Statin intolerance    Generalized body aches, onset 2010    Cognitive dysfunction    Personal history of noncompliance with medical treatment, presenting hazards to health    Prerenal azotemia    Bradycardia    Chronic pain syndrome    Neuropathy     Burning sensation of lower extremity    Frail elderly    Idiopathic peripheral neuropathy    Aortic heart murmur    Impaired functional mobility, balance, gait, and endurance    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    Review of Systems      Health Maintenance Due   Topic Date Due    Aspirin/Antiplatelet Therapy  Never done    Shingles Vaccine (1 of 2) Never done    RSV Vaccine (Age 60+ and Pregnant patients) (1 - 1-dose 60+ series) Never done    COVID-19 Vaccine (4 - 2023-24 season) 09/01/2023      Health Maintenance reviewed and discussed- no vaccine desired today.   Objective:      Physical Exam  Vitals and nursing note reviewed.   Constitutional:       General: She is not in acute distress.     Appearance: She is not ill-appearing.   HENT:      Nose: No congestion.   Cardiovascular:      Rate and Rhythm: Normal rate and regular rhythm.      Heart sounds: Murmur ((holosystolic)) heard.   Pulmonary:      Effort: Pulmonary effort is normal.      Breath sounds: Normal breath sounds. No wheezing.   Musculoskeletal:      Comments: Ambulates with walker   Skin:     General: Skin is warm and dry.      Findings: No rash.   Neurological:      Mental Status: She is alert.   Psychiatric:         Mood and Affect: Mood normal.         Behavior: Behavior normal.         Assessment:       1. Fall, initial encounter    2. Abnormal gait    3. Poor balance    4. Walker as ambulation aid    5. Coronary artery disease of native artery of native heart with stable angina pectoris    6. Stage 3a chronic kidney disease    7. Gout, unspecified cause, unspecified chronicity, unspecified site    8. Gouty tophi    9. Prediabetes    10. Vitamin D deficiency    11. Mixed  conductive and sensorineural hearing loss of both ears    12. Neuropathy        Plan:       1. Fall, initial encounter  -     Ambulatory referral/consult to Physical/Occupational Therapy; Future; Expected date: 05/10/2024    2. Abnormal gait  -     Ambulatory referral/consult to Physical/Occupational Therapy; Future; Expected date: 05/10/2024    3. Poor balance  -     Ambulatory referral/consult to Physical/Occupational Therapy; Future; Expected date: 05/10/2024    4. Walker as ambulation aid    5. Coronary artery disease of native artery of native heart with stable angina pectoris  Assessment & Plan:  No current chest pain. Overall doing well. Following up with Cardiology      6. Stage 3a chronic kidney disease  Assessment & Plan:  BMP  Lab Results   Component Value Date     03/21/2024    K 4.3 03/21/2024     03/21/2024    CO2 19 (L) 03/21/2024    BUN 27 (H) 03/21/2024    CREATININE 1.2 03/21/2024    CALCIUM 10.9 (H) 03/21/2024    ANIONGAP 14 03/21/2024    EGFRNORACEVR 43.0 (A) 03/21/2024   Chronic. Stable. Avoid nephrotoxic medications      7. Gout, unspecified cause, unspecified chronicity, unspecified site  -     allopurinoL (ZYLOPRIM) 100 MG tablet; Take 1 tablet (100 mg total) by mouth once daily.  Dispense: 90 tablet; Refill: 3    8. Gouty tophi  -     colchicine (COLCRYS) 0.6 mg tablet; 2 by mouth at onset of gout flare may repeat once in 3 hours. Max 3/24 hour period.  Dispense: 9 tablet; Refill: 1    9. Prediabetes  -     Cancel: Vitamin D; Future; Expected date: 05/03/2024  -     Hemoglobin A1C; Future; Expected date: 05/03/2024    10. Vitamin D deficiency  -     Cancel: Vitamin D; Future; Expected date: 05/03/2024  -     ergocalciferol (ERGOCALCIFEROL) 50,000 unit Cap; Take 1 capsule (50,000 Units total) by mouth every 7 days. Take one by mouth every Monday and Friday  Dispense: 4 capsule; Refill: 11    11. Mixed conductive and sensorineural hearing loss of both ears    12. Neuropathy

## 2024-05-03 NOTE — PATIENT INSTRUCTIONS
Thank you for allowing me to participate in your care today. It is an honor to be a part of your healthcare team at [facility]. If you had labs ordered today, you will receive notification via [employer], phone call or mailed letter regarding your results within 7 days. If you have any questions or concerns regarding your visit today, please do not hesitate to contact us.  Sincerely,   Rosie Harrison M.D.

## 2024-05-04 LAB
25(OH)D3+25(OH)D2 SERPL-MCNC: 21 NG/ML (ref 30–96)
VIT B12 SERPL-MCNC: 470 PG/ML (ref 210–950)

## 2024-05-13 ENCOUNTER — TELEPHONE (OUTPATIENT)
Dept: FAMILY MEDICINE | Facility: CLINIC | Age: 89
End: 2024-05-13
Payer: MEDICARE

## 2024-05-13 NOTE — TELEPHONE ENCOUNTER
----- Message from Loan Kearns sent at 5/13/2024 11:54 AM CDT -----  Contact: [facility]  Type:  Pharmacy Calling to Clarify an RX    Name of Caller:ZACH   Pharmacy Name: [facility]  Prescription Name:VIT D2  What do they need to clarify?: DIRECTION   Best Call Back Number: [phone]/ FAX # [phone]  Additional Information: THANK YOU

## 2024-05-13 NOTE — TELEPHONE ENCOUNTER
Spoke with pharmacist. She called to clarify a Vitamin D usage instructions. Verified verbally with Dr. Harrison medication instructions: Take one capsule every 7 days. Clarified with Iqra. She will change instructions to update correct orders.

## 2024-05-19 DIAGNOSIS — E83.52 HYPERCALCEMIA: Primary | ICD-10-CM

## 2024-05-24 ENCOUNTER — CLINICAL SUPPORT (OUTPATIENT)
Dept: REHABILITATION | Facility: HOSPITAL | Age: 89
End: 2024-05-24
Attending: FAMILY MEDICINE
Payer: MEDICARE

## 2024-05-24 DIAGNOSIS — Z74.09 IMPAIRED FUNCTIONAL MOBILITY, BALANCE, GAIT, AND ENDURANCE: Primary | ICD-10-CM

## 2024-05-24 DIAGNOSIS — R26.81 UNSTEADY GAIT WHEN WALKING: ICD-10-CM

## 2024-05-24 PROCEDURE — 97161 PT EVAL LOW COMPLEX 20 MIN: CPT | Mod: PN

## 2024-05-24 NOTE — PLAN OF CARE
"[facility]   Physical Therapy Initial Evaluation      Name: Yisel العلي  Clinic Number: [id number]    Therapy Diagnosis:   Encounter Diagnoses   Name Primary?    Unsteady gait when walking     Impaired functional mobility, balance, gait, and endurance Yes        Physician: Rosie Harrison MD    Physician Orders: PT Eval and Treat   Medical Diagnosis from Referral:   Diagnosis   R26.81 (ICD-10-CM) - Unsteady gait when walking     Evaluation Date: 5/24/2024  Authorization Period Expiration: 12/31/2024  Plan of Care Expiration: 8/24/2024  Progress Note Due: 6/24/2024  Date of Surgery: n/a  Visit # / Visits authorized: 1/ 1   FOTO: 1/ 3    Precautions: Standard and Fall     Time In: 1:00 pm   Time Out: 1:45 pm   Total Billable Time: 45 minutes    Subjective     Date of onset: 3/21/2024  - fell at home, went to ER     History of current condition - Yisel reports: She was walking in the kitchen and turned to get something and she lost her balance and fell backwards striking the back of her head. Yisel stated that she stayed on the floor, for awhile - must have fallen asleep because the next thing she knew she woke up and there were 2 "police officers" standing over her.  Per chart, EMS was called and took patient to ER. Had 2 x 3 cm raised contusion to the left parietal occipital region. - cleansed and staples. CT/Xrays were all negative.   Yisel stated that she asked for referral to PT because of dizziness and balance. She stated that she feels dizzy all of the time. Wanted someone to assess her walking ability. Stated that when she lies down, and turns her head to the left - this is the worst for dizziness, Yisel does have history of Meniere disease, and hearing loss.  Also has cervical disc disease/spondylosis and stenosis - felt this was contributing to dizziness when patient was seen in clinic a year ago.   I feel dizzy all of the time.  Turning to the left in bed makes me really " dizzy, I just wait until is passes. Patient does get up several times per night to go to the bathroom. Stated that she just takes her time. Able to get in/out of the car and manage her RW by herself; Has history of BLE neuropathy; chronic cervical/lumbar spondylosis with severe DDD.      Falls: 3/21/2024    Prior Therapy: Patient was here about a year ago for similar diagnosis.   Social History: lives alone, daughter lives nearby and checks on her often, helps with household chores.    Occupation: retired   Prior Level of Function: Mod I uses tripod RW;  drives,   Current Level of Function: daughter does a lot for her; housework, grocery shopping; meals;  patient still drives short distances for appointments, sedentary for the most part; stated that she sleeps/naps often during the day.     Pain:  Current 2/10, worst 4/10, best 1/10   Location:    lower back   Description: Aching  Aggravating Factors: standing more than 10 mins;  sits for a few minutes and then gets back up again.   Easing Factors: rest     Patients goals: To      Medical History:   Past Medical History:   Diagnosis Date    Anticoagulant long-term use     Arthritis     CAD (coronary artery disease)     Cancer     skin    Cataract     CKD (chronic kidney disease), stage III     HEARING LOSS     Hematoma complicating a procedure 1213    ant abd wall    [place] (hard of hearing)     BILAT AIDS    Hyperlipidemia     Hypertension     Meniere disease     Pneumonia     Retinal detachment     not sure which eye    Vertigo     Wears glasses        Surgical History:   Yisel العلي  has a past surgical history that includes Coronary stent placement; carotid surgery; Hysterectomy; Cataract extraction; Retinal detachment surgery; Partial nephrectomy (1013); Knee surgery (Right); and Epidural steroid injection (N/A, 5/4/2022).    Medications:   Yisel has a current medication list which includes the following prescription(s): allopurinol, ammonium lactate,  "arexvy (pf), colchicine, diaper,brief,adult,disposable, docosanol, ergocalciferol, fluzone highdose quad 23-24 pf, lisinopril, and mupirocin.    Allergies:   Review of patient's allergies indicates:   Allergen Reactions    Codeine Other (See Comments)     dosent remember reaction;maybe nausea        Objective      Posture: Standing: Forward head, rounded shoulders; wide NOLBERTO           Range of Motion:   UEs WFL  Les WFL     Upper Extremity Strength  RUE WFL  LUE WFL   functional     Lower Extremity Strength  RLE Grossly tested 3+/5 at hips; 4-/5 at knees and 3+/5 at ankles   LLE Grossly tested 3+/5 at hips; 4-/5 at knees and 3+/5 at ankles     Functional mobility:    Gait: Patient ambulating with 3-wheeled RW AD with no assistance for 150 feet: Wide NOLBERTO; toes out, slow pace with use of her RW; choppy steps but stable on tile surfaces and out in parking lot - asphalt surface; able to negotiate small incline ramp - just slows her speed; Cannot vary her walking speed - feels very uncomfortable to increase speed.   Turns: extra steps on turn around    Transfers:    Sit to stand: X 5 in  24.70 secs - stabilization of back of legs on mat with standing noted    TU sec with use of RW     Squat pivot: Mod I   Stand pivot: Mod I   Supine to sit: CGA today     Stairs:  Not tested; Did have Yisel step up and down on 4" step - able to do so with bilateral handrail assist.     Vestibular: did check for BPPV - negative in ant/post and lateral canals: patient does have c/o dizziness with left head turn, but does not have any nystagmus with testing.  This is unchanged from exam a year ago.     Sensation: impaired to light touch Bilateral lower legs from ankles into feet.      PT Evaluation Completed? Yes  Discussed Plan of Care with patient: Yes    Intake Outcome Measure for FOTO balance  Survey    Therapist reviewed FOTO scores for Yisel العلي on 2024.   FOTO report - see Media section or FOTO account episode " details.    Intake Score: 35 %       Patient Education and Home Exercises     Education provided:   - Discussed dizziness again with patient - no change in her presentation, has restrictions in cervical motion, hearing loss and meniere disease.  Will address balance deficits/co's and provide LE strengthening exercises with HEP.     Written Home Exercises Provided: Will provide HEP at next visits.   Yisel demonstrated fair  understanding of the education provided.     Assessment     Yisel is a 89 y.o. female referred to outpatient Physical Therapy with a medical diagnosis of unsteady gait while walking. Patient presents with c/o dizziness, impaired functional mobility, balance, gait and endurance.  She had recent fall, striking her head.  Will benefit from Skilled physical therapy intervention to address her deficits noted above.     Patient prognosis is Fair.   Patient will benefit from skilled outpatient Physical Therapy to address the deficits stated above and in the chart below, provide patient /family education, and to maximize patientt's level of independence.     Plan of care discussed with patient: Yes  Patient's spiritual, cultural and educational needs considered and patient is agreeable to the plan of care and goals as stated below:     Anticipated Barriers for therapy: attendance at therapy session.     Medical Necessity is demonstrated by the following  History  Co-morbidities and personal factors that may impact the plan of care [] LOW: no personal factors / co-morbidities  [x] MODERATE: 1-2 personal factors / co-morbidities  [] HIGH: 3+ personal factors / co-morbidities    Moderate / High Support Documentation:   Co-morbidities affecting plan of care: advanced age, CAD, CKD, neuropathy, chronic neck/back pain with stenosis.     Personal Factors:   age  lifestyle     Examination  Body Structures and Functions, activity limitations and participation restrictions that may impact the plan of care [x] LOW:  addressing 1-2 elements  [] MODERATE: 3+ elements  [] HIGH: 4+ elements (please support below)    Moderate / High Support Documentation: n/a     Clinical Presentation [x] LOW: stable  [] MODERATE: Evolving  [] HIGH: Unstable     Decision Making/ Complexity Score: low       Goals:  Short Term Goals: 3 weeks   Demonstrate improvement in recent symptoms to progress toward her long term goals   Able to avoid any falls or LOB at home   Compliant with HEP     Long Term Goals: 6 weeks   Reduction in # of dizzy episodes to no more than 2-3 per week   Increase ambulation distance with her RW to 300ft   FOTO balance score improved to 46  Independent with HEP     Plan     Plan of care Certification: 5/24/2024 to 8/24/2024.    Outpatient Physical Therapy 1-2 times weekly for 6 weeks to include the following interventions: Gait Training, Manual Therapy, Neuromuscular Re-ed, Patient Education, Therapeutic Activities, and Therapeutic Exercise.     Sandee Mujica, PT        Physician's Signature: _________________________________________ Date: ________________

## 2024-06-04 ENCOUNTER — OFFICE VISIT (OUTPATIENT)
Dept: FAMILY MEDICINE | Facility: CLINIC | Age: 89
End: 2024-06-04
Payer: MEDICARE

## 2024-06-04 VITALS
SYSTOLIC BLOOD PRESSURE: 110 MMHG | HEART RATE: 60 BPM | WEIGHT: 175 LBS | HEIGHT: 64 IN | RESPIRATION RATE: 18 BRPM | BODY MASS INDEX: 29.88 KG/M2 | DIASTOLIC BLOOD PRESSURE: 78 MMHG | OXYGEN SATURATION: 97 %

## 2024-06-04 DIAGNOSIS — K11.20 SALIVARY GLAND INFECTION: Primary | ICD-10-CM

## 2024-06-04 PROCEDURE — 99999 PR PBB SHADOW E&M-EST. PATIENT-LVL III: CPT | Mod: PBBFAC,,, | Performed by: NURSE PRACTITIONER

## 2024-06-04 PROCEDURE — 1101F PT FALLS ASSESS-DOCD LE1/YR: CPT | Mod: CPTII,S$GLB,, | Performed by: NURSE PRACTITIONER

## 2024-06-04 PROCEDURE — 3288F FALL RISK ASSESSMENT DOCD: CPT | Mod: CPTII,S$GLB,, | Performed by: NURSE PRACTITIONER

## 2024-06-04 PROCEDURE — 99213 OFFICE O/P EST LOW 20 MIN: CPT | Mod: S$GLB,,, | Performed by: NURSE PRACTITIONER

## 2024-06-04 PROCEDURE — 1159F MED LIST DOCD IN RCRD: CPT | Mod: CPTII,S$GLB,, | Performed by: NURSE PRACTITIONER

## 2024-06-04 PROCEDURE — 1125F AMNT PAIN NOTED PAIN PRSNT: CPT | Mod: CPTII,S$GLB,, | Performed by: NURSE PRACTITIONER

## 2024-06-04 RX ORDER — CEPHALEXIN 500 MG/1
500 CAPSULE ORAL EVERY 8 HOURS
Qty: 21 CAPSULE | Refills: 0 | Status: SHIPPED | OUTPATIENT
Start: 2024-06-04 | End: 2024-06-11

## 2024-06-04 RX ORDER — CEPHALEXIN 500 MG/1
500 CAPSULE ORAL EVERY 8 HOURS
Qty: 21 CAPSULE | Refills: 0 | Status: SHIPPED | OUTPATIENT
Start: 2024-06-04 | End: 2024-06-04

## 2024-06-04 NOTE — PROGRESS NOTES
"Subjective:       Patient ID: Yisel العلي is a 89 y.o. female.    Chief Complaint: No chief complaint on file.    Yisel العلي presents to the clinic today for acute onset of left ear/jaw discomfort   Established patient within the clinic, new patient to myself    Under the care of the following:  PCP: Dr. Harrison  Cardiology: Dr. Franklin  Otolaryngologist: Dr. Vital  OBGYN: Dr. Simeon  Dermatology: Dr. Padilla    Reports she woke up this morning and experienced a sharp stabbing pain into the left ear that radiated down jaw line to mid chin region.  Wears hearing aid bilaterally  Reports was unable to eat/drink due to discomfort to the jaw line/ area under the tongue  Used a heating pad to assist with discomfort   Reports around lunch time she experienced a repeat of her symptoms.  Reports she has had a enlarged salivary gland for > 30 years- at one time it was suggested to have this "drained/opened" but chose not to.  Reports for the past 3-4 days she has been having increased salivation vs dry mouth   Difficulty sleeping due to increased salvation requiring her to spit- reports that this has become thicker in nature and slightly discolored       Review of Systems   Constitutional:  Negative for fatigue and fever.   HENT:  Positive for ear pain and hearing loss. Negative for dental problem, ear discharge, facial swelling, sore throat, trouble swallowing and voice change.    Neurological:  Positive for dizziness.       Patient Active Problem List   Diagnosis    Coronary artery disease of native artery of native heart with stable angina pectoris    Elevated fasting glucose    Hypercalcemia    Mixed hyperlipidemia, baseline LDL not available    HTN (hypertension), benign    Rhinitis    Tear of retina without detachment - Right Eye    EPIPHORA    Dry eyes    Pseudophakia - Both Eyes    Vitamin D deficiency    Anemia    Difficulty walking    Lower extremity pain    Back pain    Nephrolithiasis    Hypertensive " heart disease without heart failure    Anxiety    Stress    Hyponatremia    Class 1 obesity due to excess calories with serious comorbidity and body mass index (BMI) of 32.0 to 32.9 in adult    Memory difficulties    Poor balance, onset 2014    Status post coronary artery stent placement    BMI 33.0-33.9,adult    Abdominal obesity    Sedentary lifestyle    Chronic bilateral low back pain with left-sided sciatica    Excessive daytime sleepiness, refuse sleep study    Hyperlipidemia    Stage 3a chronic kidney disease    Osteoarthritis of multiple joints    Mixed conductive and sensorineural hearing loss of both ears    Atypical chest pain, onset 2007    History of left-sided carotid endarterectomy    Dizziness, onset 1985    Bruit of right carotid artery    Gouty tophi    Statin intolerance    Generalized body aches, onset 2010    Cognitive dysfunction    Personal history of noncompliance with medical treatment, presenting hazards to health    Prerenal azotemia    Bradycardia    Chronic pain syndrome    Neuropathy    Burning sensation of lower extremity    Frail elderly    Idiopathic peripheral neuropathy    Aortic heart murmur    Impaired functional mobility, balance, gait, and endurance    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   Constitutional:       General: She is not in acute distress.     Appearance: She is not ill-appearing.   HENT:      Right Ear: Tympanic membrane normal. Decreased hearing noted.      Left Ear: Tympanic membrane normal. Decreased hearing noted.      Nose: Nose normal. No congestion.      Mouth/Throat:      Lips: Pink.      Mouth: Mucous membranes are moist.      Dentition: No gum lesions.      Tongue: Tongue does not deviate from midline.      Pharynx: Oropharynx is clear. Uvula midline.        Comments: Enlargement of the sublingual salivary gland    Neck:      Trachea: Trachea normal.   Cardiovascular:       "Rate and Rhythm: Normal rate and regular rhythm.      Heart sounds: Murmur ((holosystolic)) heard.   Pulmonary:      Effort: Pulmonary effort is normal.      Breath sounds: Normal breath sounds. No wheezing.   Musculoskeletal:      Cervical back: Full passive range of motion without pain and normal range of motion.      Comments: Ambulates with walker   Lymphadenopathy:      Cervical: No cervical adenopathy.   Skin:     General: Skin is warm and dry.      Findings: No rash.   Neurological:      Mental Status: She is alert.   Psychiatric:         Mood and Affect: Mood normal.         Behavior: Behavior normal.         Lab Results   Component Value Date    WBC 6.92 03/21/2024    HGB 13.9 03/21/2024    HCT 40.3 03/21/2024     03/21/2024    CHOL 197 02/01/2023    TRIG 118 02/01/2023    HDL 54 02/01/2023    ALT 11 05/03/2024    AST 15 05/03/2024     05/03/2024    K 4.6 05/03/2024     (H) 05/03/2024    CREATININE 1.3 05/03/2024    BUN 23 05/03/2024    CO2 23 05/03/2024    TSH 1.400 05/03/2024    HGBA1C 5.4 05/03/2024     The ASCVD Risk score (Vibha DK, et al., 2019) failed to calculate for the following reasons:    The 2019 ASCVD risk score is only valid for ages 40 to 79  Visit Vitals  /78 (BP Location: Right arm, Patient Position: Sitting, BP Method: Medium (Manual))   Pulse 60   Resp 18   Ht 5' 4" (1.626 m)   Wt 79.4 kg (175 lb)   SpO2 97%   BMI 30.04 kg/m²      Assessment:       1. Salivary gland infection        Plan:       1. Salivary gland infection  -     cephALEXin (KEFLEX) 500 MG capsule; Take 1 capsule (500 mg total) by mouth every 8 (eight) hours. for 7 days  Dispense: 21 capsule; Refill: 0  Begin antibiotic as prescribed with food, maintain hydration.  Warm compresses for discomfort  For worsening s/sx, difficulty swallowing/speaking- ER   Encouraged short term follow up with ENT for re-evaluation/further evaluation.      No follow-ups on file.      Future Appointments       Date " Provider Specialty Appt Notes    6/18/2024 Sandee Mujica, PT Outpatient Rehab GAIT    6/20/2024 Sandee Mujica, PT Outpatient Rehab GAIT    6/25/2024 Sandee Mujica, PT Outpatient Rehab GAIT    6/27/2024 Sandee Mujica, PT Outpatient Rehab GAIT    9/4/2024 Rosie Harrison MD Family Medicine 4 mon f/u    10/8/2024 Raphael Franklin MD Cardiology Annual

## 2024-06-05 ENCOUNTER — TELEPHONE (OUTPATIENT)
Dept: OTOLARYNGOLOGY | Facility: CLINIC | Age: 89
End: 2024-06-05

## 2024-06-18 ENCOUNTER — CLINICAL SUPPORT (OUTPATIENT)
Dept: REHABILITATION | Facility: HOSPITAL | Age: 89
End: 2024-06-18
Payer: MEDICARE

## 2024-06-18 DIAGNOSIS — Z74.09 IMPAIRED FUNCTIONAL MOBILITY, BALANCE, GAIT, AND ENDURANCE: Primary | ICD-10-CM

## 2024-06-18 PROCEDURE — 97110 THERAPEUTIC EXERCISES: CPT | Mod: PN,CQ

## 2024-06-18 PROCEDURE — 97530 THERAPEUTIC ACTIVITIES: CPT | Mod: PN,CQ

## 2024-06-18 NOTE — PROGRESS NOTES
"[facility]   Physical Therapy Treatment Note      Name: Yisel العلي  Clinic Number: [id number]    Therapy Diagnosis:   Encounter Diagnosis   Name Primary?    Impaired functional mobility, balance, gait, and endurance Yes     Physician: Rosie Harrison MD    Visit Date: 6/18/2024    Physician Orders: PT Eval and Treat   Medical Diagnosis from Referral:   Diagnosis   R26.81 (ICD-10-CM) - Unsteady gait when walking      Evaluation Date: 5/24/2024  Authorization Period Expiration: 12/31/2024  Plan of Care Expiration: 8/24/2024  Progress Note Due: 6/24/2024  Date of Surgery: n/a  Visit # / Visits authorized: 1 / 12   FOTO: 1/ 3     Precautions: Standard and Fall      Time In: 11:20 AM  Time Out: 12:10 PM  Total Billable Time: 40 minutes    PTA Visit #: 1/5       Subjective     Patient reports: Feeling ok today.  She was not compliant with home exercise program as one has not been issued  Response to previous treatment: N/A  Functional change: N/A    Pain: 2-3/10  Location:  Low back     Objective      Objective Measures updated at progress report unless specified.     Treatment     Yisel received the treatments listed below:      therapeutic exercises to develop strength, endurance, and ROM for 30 minutes including:  Nustep level 4 x 15 min  Heel Raises x 15  LAQ x 15  Seated Marching x 15  Seated Hip Abd w/ YTB x 15  Seated Ball Squeezes x 2 min  Sit to Stand x 10    manual therapy techniques:  were applied to the:  for  minutes, including:  neuromuscular re-education activities to improve:  for  minutes. The following activities were included:    therapeutic activities to improve functional performance for 10  minutes, including:  Toe Taps on 4" step x 2 min  Step-ups on 4" step x 10 each  Heel Cord stretch on wedge 3 x 30 sec  StandIng Hip Flex/Abd/Ext x 10 each    gait training to improve functional mobility and safety for   minutes, including:      direct contact modalities after " being cleared for contraindications:     supervised modalities after being cleared for contradictions:     hot pack for  minutes to .    cold pack for  minutes to .    Patient Education and Home Exercises       Education provided:   - Role of PT; POC; Discussed dizziness again with patient - no change in her presentation, has restrictions in cervical motion, hearing loss and meniere disease. Will address balance deficits/co's and provide LE strengthening exercises with HEP.     Written Home Exercises Provided:  HEP will b issued as visits progress . Exercises were reviewed and Yisel was able to demonstrate them prior to the end of the session.  Yisel demonstrated fair understanding of the education provided. See Electronic Medical Record under Patient Instructions for exercises provided during therapy sessions    Assessment     Patient did ok with exercises. Limited standing tolerance secondary to increased LBP.    Yisel Is progressing well towards her goals.   Patient prognosis is Fair.     Patient will continue to benefit from skilled outpatient physical therapy to address the deficits listed in the problem list box on initial evaluation, provide pt/family education and to maximize pt's level of independence in the home and community environment.     Patient's spiritual, cultural and educational needs considered and pt agreeable to plan of care and goals.     Anticipated barriers to physical therapy: Attendance at therapy sessions    Goals:   Short Term Goals: 3 weeks   Demonstrate improvement in recent symptoms to progress toward her long term goals   Able to avoid any falls or LOB at home   Compliant with HEP      Long Term Goals: 6 weeks   Reduction in # of dizzy episodes to no more than 2-3 per week   Increase ambulation distance with her RW to 300ft   FOTO balance score improved to 46  Independent with HEP     Plan     Continue per POC and progress with strength/mobility exercises as patient  jose.    Plan of care Certification: 5/24/2024 to 8/24/2024.     Outpatient Physical Therapy 1-2 times weekly for 6 weeks to include the following interventions: Gait Training, Manual Therapy, Neuromuscular Re-ed, Patient Education, Therapeutic Activities, and Therapeutic Exercise.     Jonathan Favre, PTA

## 2024-07-02 ENCOUNTER — CLINICAL SUPPORT (OUTPATIENT)
Dept: REHABILITATION | Facility: HOSPITAL | Age: 89
End: 2024-07-02
Payer: MEDICARE

## 2024-07-02 DIAGNOSIS — Z74.09 IMPAIRED FUNCTIONAL MOBILITY, BALANCE, GAIT, AND ENDURANCE: Primary | ICD-10-CM

## 2024-07-02 PROCEDURE — 97110 THERAPEUTIC EXERCISES: CPT | Mod: PN,CQ

## 2024-07-02 PROCEDURE — 97530 THERAPEUTIC ACTIVITIES: CPT | Mod: PN,CQ

## 2024-07-02 NOTE — PROGRESS NOTES
"[facility]   Physical Therapy Treatment Note      Name: Yisel العلي  Clinic Number: [id number]    Therapy Diagnosis:   Encounter Diagnosis   Name Primary?    Impaired functional mobility, balance, gait, and endurance Yes     Physician: Rosie Harrison MD    Visit Date: 7/2/2024    Physician Orders: PT Eval and Treat   Medical Diagnosis from Referral:   Diagnosis   R26.81 (ICD-10-CM) - Unsteady gait when walking      Evaluation Date: 5/24/2024  Authorization Period Expiration: 12/31/2024  Plan of Care Expiration: 8/24/2024  Progress Note Due: 6/24/2024  Date of Surgery: n/a  Visit # / Visits authorized: 2 / 12   FOTO: 1/ 3     Precautions: Standard and Fall      Time In: 11:15 AM  Time Out: 12:05 PM  Total Billable Time: 40 minutes    PTA Visit #: 2/5       Subjective     Patient reports: I have been out because of my car.   She was not compliant with home exercise program as one has not been issued  Response to previous treatment: N/A  Functional change: N/A    Pain: 4/10  Location:  Low back     Objective      Objective Measures updated at progress report unless specified.     Treatment     Yisel received the treatments listed below:      therapeutic exercises to develop strength, endurance, and ROM for 30 minutes including:  Nustep level 4 x 15 min  Heel Raises x 15  LAQ x 15  Seated Marching x 15  Seated Hip Abd w/ YTB x 15  Seated Ball Squeezes x 2 min  Sit to Stand x 10    manual therapy techniques:  were applied to the:  for  minutes, including:  neuromuscular re-education activities to improve:  for  minutes. The following activities were included:    therapeutic activities to improve functional performance for 10  minutes, including:  Toe Taps on 4" step x 2 min  Step-ups on 4" step x 10 each  Heel Cord stretch on wedge 3 x 30 sec  StandIng Hip Flex/Abd/Ext x 10 each    gait training to improve functional mobility and safety for   minutes, including:      direct contact " modalities after being cleared for contraindications:     supervised modalities after being cleared for contradictions:     hot pack for  minutes to .    cold pack for  minutes to .    Patient Education and Home Exercises       Education provided:   - Role of PT; POC; Discussed dizziness again with patient - no change in her presentation, has restrictions in cervical motion, hearing loss and meniere disease. Will address balance deficits/co's and provide LE strengthening exercises with HEP.     Written Home Exercises Provided:  HEP will b issued as visits progress . Exercises were reviewed and Yisel was able to demonstrate them prior to the end of the session.  Yisel demonstrated fair understanding of the education provided. See Electronic Medical Record under Patient Instructions for exercises provided during therapy sessions    Assessment     Patient did ok with exercises. Limited standing tolerance secondary to increased LBP.    Yisel Is progressing well towards her goals.   Patient prognosis is Fair.     Patient will continue to benefit from skilled outpatient physical therapy to address the deficits listed in the problem list box on initial evaluation, provide pt/family education and to maximize pt's level of independence in the home and community environment.     Patient's spiritual, cultural and educational needs considered and pt agreeable to plan of care and goals.     Anticipated barriers to physical therapy: Attendance at therapy sessions    Goals:   Short Term Goals: 3 weeks   Demonstrate improvement in recent symptoms to progress toward her long term goals   Able to avoid any falls or LOB at home   Compliant with HEP      Long Term Goals: 6 weeks   Reduction in # of dizzy episodes to no more than 2-3 per week   Increase ambulation distance with her RW to 300ft   FOTO balance score improved to 46  Independent with HEP     Plan     Continue per POC and progress with strength/mobility exercises as patient  jose.    Plan of care Certification: 5/24/2024 to 8/24/2024.     Outpatient Physical Therapy 1-2 times weekly for 6 weeks to include the following interventions: Gait Training, Manual Therapy, Neuromuscular Re-ed, Patient Education, Therapeutic Activities, and Therapeutic Exercise.     Jonathan Favre, PTA

## 2024-07-25 ENCOUNTER — CLINICAL SUPPORT (OUTPATIENT)
Dept: REHABILITATION | Facility: HOSPITAL | Age: 89
End: 2024-07-25
Payer: MEDICARE

## 2024-07-25 DIAGNOSIS — Z74.09 IMPAIRED FUNCTIONAL MOBILITY, BALANCE, GAIT, AND ENDURANCE: Primary | ICD-10-CM

## 2024-07-25 PROCEDURE — 97140 MANUAL THERAPY 1/> REGIONS: CPT | Mod: KX,PN

## 2024-07-25 PROCEDURE — 97112 NEUROMUSCULAR REEDUCATION: CPT | Mod: KX,PN

## 2024-07-27 NOTE — PROGRESS NOTES
[facility]   Physical Therapy Treatment Note      Name: Yisel Villa [facility]  Clinic Number: [id number]    Therapy Diagnosis:   Encounter Diagnosis   Name Primary?    Impaired functional mobility, balance, gait, and endurance Yes     Physician: Rosie Harrison MD    Visit Date: 7/25/2024    Physician Orders: PT Eval and Treat   Medical Diagnosis from Referral:   Diagnosis   R26.81 (ICD-10-CM) - Unsteady gait when walking      Evaluation Date: 5/24/2024  Authorization Period Expiration: 12/31/2024  Plan of Care Expiration: 8/24/2024  Progress Note Due: 6/24/2024  Date of Surgery: n/a  Visit # / Visits authorized: 3 / 12   FOTO: 1/ 3     Precautions: Standard and Fall      Time In: 11:15 AM  Time Out: 12:00 PM  Total Billable Time: 40 minutes    PTA Visit #: 0/5       Subjective     Patient reports: I have just not been feeling well, I've had such dizziness that I hope you can help me with.  I haven't been able to do the exercises, because I'm afraid I will fall.    She was not compliant with home exercise program as one has not been issued  Response to previous treatment: N/A  Functional change: N/A    Pain: 4/10  Location:  Low back     Objective      Objective Measures updated at progress report unless specified.     Treatment     Yisel received the treatments listed below:      therapeutic exercises to develop strength, endurance, and ROM for  minutes including:  Nustep level 4 x 15 min  Heel Raises x 15  LAQ x 15  Seated Marching x 15  Seated Hip Abd w/ YTB x 15  Seated Ball Squeezes x 2 min  Sit to Stand x 10    manual therapy techniques:  were applied to the: cervical spine  for 15  minutes, including:  Supine with upper trunk supported in some flexion: cervical sub-occipital inhibition with light traction applied - patient reporting improvement in her symptoms; OA flexion to address restriction; right and left upper trap/levator stretching on each side; Performed some light cervical  "traction again with end range hold as patient felt relief of her symptoms from this.     neuromuscular re-education activities to improve and assess vestibular/dizziness c/o's  for 30 minutes. The following activities were included:  Patient noting two types of dizziness, the room spinning dizziness that she feels when she goes from a sitting <> supine position, turns onto her left side and the dizziness that she describes as "in myself - more like lightheadedness".  Yisel stated that she usually feels lightheaded most of the time, but she can manage this.  The extreme dizziness occurs with movement sit <>supine, Sit <> stand and turning her head to left, rolling to left in supine.  She said that she has to move slowly, waits for symptoms to calm down before she moves.      Had patient long sit on mat - turned head to 45 deg left rotation and assisted patient into supine position with some head extension - left ear pointing down( patient unable to tolerate much) Immediate c/o increased dizziness, but no sign of eye nystagmus at all; waited for symptoms to resolve and had patient turn head to right in same position - had some transient symptoms, but quickly went away.  Performed lateral canal testing with quick head turns to left and right - symptoms to left, not right, no nystagmus at all.    Sitting: head turn to left - negative;  Added trunk flexion to left knee - negative   Sitting: head turn to right - negative, added trunk flexion to right knee - negative.     HEP:  VOR 1:  look at object - focus on it and move head up/down; move head from right to left - keep focus on object while moving head  Gave patient printed copy of these exercises. Able to demonstrate ability to perform       therapeutic activities to improve functional performance for   minutes, including:  Toe Taps on 4" step x 2 min  Step-ups on 4" step x 10 each  Heel Cord stretch on wedge 3 x 30 sec  StandIng Hip Flex/Abd/Ext x 10 each    gait " training to improve functional mobility and safety for   minutes, including:        Patient Education and Home Exercises       Education provided:   - gave patient VOR 1 exercises; explained cervicogenic dizziness to patient - feel this is a good deal of her issues, as well as hearing loss, and history of Meniere's disease.      Written Home Exercises Provided:  HEP will b issued as visits progress . Exercises were reviewed and Yisel was able to demonstrate them prior to the end of the session.  Yisel demonstrated fair understanding of the education provided. See Electronic Medical Record under Patient Instructions for exercises provided during therapy sessions    Assessment     Patient reporting improvement in her dizziness symptoms following treatment today; Will continue 1x/week for next 4-6 weeks to address cervicalgia and vestibular deficits to see if we can improve patient's symptoms and mobility as a result.     Yisel Is progressing well towards her goals.   Patient prognosis is Fair.     Patient will continue to benefit from skilled outpatient physical therapy to address the deficits listed in the problem list box on initial evaluation, provide pt/family education and to maximize pt's level of independence in the home and community environment.     Patient's spiritual, cultural and educational needs considered and pt agreeable to plan of care and goals.     Anticipated barriers to physical therapy: Attendance at therapy sessions    Goals:   Short Term Goals: 3 weeks   Demonstrate improvement in recent symptoms to progress toward her long term goals   Able to avoid any falls or LOB at home   Compliant with HEP      Long Term Goals: 6 weeks   Reduction in # of dizzy episodes to no more than 2-3 per week   Increase ambulation distance with her RW to 300ft   FOTO balance score improved to 46  Independent with HEP     Plan       Plan of care Certification: 5/24/2024 to 8/24/2024.     Outpatient Physical Therapy  1-2 times weekly for 6 weeks to include the following interventions: Gait Training, Manual Therapy, Neuromuscular Re-ed, Patient Education, Therapeutic Activities, and Therapeutic Exercise.     Sandee Mujica, PT

## 2024-08-01 ENCOUNTER — OFFICE VISIT (OUTPATIENT)
Dept: OTOLARYNGOLOGY | Facility: CLINIC | Age: 89
End: 2024-08-01
Payer: MEDICARE

## 2024-08-01 ENCOUNTER — OFFICE VISIT (OUTPATIENT)
Dept: FAMILY MEDICINE | Facility: CLINIC | Age: 89
End: 2024-08-01
Payer: MEDICARE

## 2024-08-01 VITALS
DIASTOLIC BLOOD PRESSURE: 76 MMHG | HEART RATE: 64 BPM | BODY MASS INDEX: 29.88 KG/M2 | SYSTOLIC BLOOD PRESSURE: 122 MMHG | WEIGHT: 175 LBS | RESPIRATION RATE: 16 BRPM | OXYGEN SATURATION: 98 % | HEIGHT: 64 IN | WEIGHT: 175 LBS | HEIGHT: 64 IN | BODY MASS INDEX: 29.88 KG/M2

## 2024-08-01 DIAGNOSIS — H81.10 BPPV (BENIGN PAROXYSMAL POSITIONAL VERTIGO), UNSPECIFIED LATERALITY: Primary | ICD-10-CM

## 2024-08-01 DIAGNOSIS — L98.9 SKIN LESION OF LEFT LOWER EXTREMITY: Primary | ICD-10-CM

## 2024-08-01 PROCEDURE — 99999 PR PBB SHADOW E&M-EST. PATIENT-LVL V: CPT | Mod: PBBFAC,,, | Performed by: NURSE PRACTITIONER

## 2024-08-01 PROCEDURE — 3288F FALL RISK ASSESSMENT DOCD: CPT | Mod: CPTII,S$GLB,, | Performed by: NURSE PRACTITIONER

## 2024-08-01 PROCEDURE — 1159F MED LIST DOCD IN RCRD: CPT | Mod: CPTII,S$GLB,, | Performed by: OTOLARYNGOLOGY

## 2024-08-01 PROCEDURE — 1126F AMNT PAIN NOTED NONE PRSNT: CPT | Mod: CPTII,S$GLB,, | Performed by: NURSE PRACTITIONER

## 2024-08-01 PROCEDURE — 3288F FALL RISK ASSESSMENT DOCD: CPT | Mod: CPTII,S$GLB,, | Performed by: OTOLARYNGOLOGY

## 2024-08-01 PROCEDURE — 99213 OFFICE O/P EST LOW 20 MIN: CPT | Mod: S$GLB,,, | Performed by: NURSE PRACTITIONER

## 2024-08-01 PROCEDURE — 1101F PT FALLS ASSESS-DOCD LE1/YR: CPT | Mod: CPTII,S$GLB,, | Performed by: NURSE PRACTITIONER

## 2024-08-01 PROCEDURE — 99214 OFFICE O/P EST MOD 30 MIN: CPT | Mod: S$GLB,,, | Performed by: OTOLARYNGOLOGY

## 2024-08-01 PROCEDURE — 1160F RVW MEDS BY RX/DR IN RCRD: CPT | Mod: CPTII,S$GLB,, | Performed by: OTOLARYNGOLOGY

## 2024-08-01 PROCEDURE — 1126F AMNT PAIN NOTED NONE PRSNT: CPT | Mod: CPTII,S$GLB,, | Performed by: OTOLARYNGOLOGY

## 2024-08-01 PROCEDURE — 1159F MED LIST DOCD IN RCRD: CPT | Mod: CPTII,S$GLB,, | Performed by: NURSE PRACTITIONER

## 2024-08-01 PROCEDURE — 1100F PTFALLS ASSESS-DOCD GE2>/YR: CPT | Mod: CPTII,S$GLB,, | Performed by: OTOLARYNGOLOGY

## 2024-08-01 PROCEDURE — 99999 PR PBB SHADOW E&M-EST. PATIENT-LVL III: CPT | Mod: PBBFAC,,, | Performed by: OTOLARYNGOLOGY

## 2024-08-01 NOTE — PROGRESS NOTES
Subjective:       Patient ID: Yisel العلي is a 89 y.o. female.    Chief Complaint: Dizziness (Pt c/o dizziness and cyst in saliva gland )      This patient comes in for a couple of things she continues to have balance issues and some dizziness as we discuss her dizziness she gets dizzy just sitting in the office and she mentions that she has a consistent issue with rolling over on her left side and getting dizzy so I think that positional vertigo has a component although it may not represent the entirety of her balance problem for which she uses a walker.  She does see a physical therapist I am not sure the nature of her interaction with the physical therapist however.    She wants to bring up an issue that she thinks his related.  Forty or 50 years ago at [facility] they told her she had a cyst in her left salivary gland and wanted to cut it out she never wanted to do that she wonders if that is tied to her dizziness she tells me she makes a lot of saliva more on the left than the right          Objective:      ENT Physical Exam    So her salivary glands, specifically her left submandibular gland which she points to feels perfectly normal and symmetric that is no obvious mass lesion or tenderness and that is nothing different about the left than the right to palpation she has absolutely confident that is a source of extra saliva that she notices.    Her parotid glands feel normal     Her neck is without adenopathy     She gets dizzy just sitting in the office as she sits peacefully I think she does have an inner ear issue going on.        Assessment:       1. BPPV (benign paroxysmal positional vertigo), unspecified laterality         Plan:          So I have tried to dispelled the possibility that is some type of laser or surgery is appropriate for her sensation of excess saliva and her historic visit in [facility] 40 or 50 years ago where they told her her left submandibular gland  needed to be removed.  There was nothing abnormal on exam today and that is not a known cause of dizziness     I do however think she has an inner ear problem based on her persistent problem of dizziness when she rolls over to her left in bed and just seeing her become somewhat vertiginous as she sits in the office today that is not just a balance problems she has active dizziness     I have given her the sheet for left-sided positional vertigo exercises and asked her to either try them at home or show this sheet to the physical therapist next time she is in she tells me she has four or five visits remaining and in the event that that does not work out of asked her to contact me so I can refer her to a physical therapist with special interest in repositioning maneuvers.

## 2024-08-01 NOTE — PROGRESS NOTES
"Subjective:       Patient ID: Yisel العلي is a 89 y.o. female.    Chief Complaint: Leg Pain ("Sore" on L leg)     Yisel العلي presents to the clinic today for    Under the care of the following:  PCP: Dr. Harrison  Cardiology: Dr. Franklin  Otolaryngologist: Dr. Vital  OBGYN: Dr. Simeon  Dermatology: Dr. Padilla    Patient Active Problem List  Diagnosis  · Coronary artery disease of native artery of native heart with stable angina pectoris  · Elevated fasting glucose  · Hypercalcemia  · Mixed hyperlipidemia, baseline LDL not available  · HTN (hypertension), benign  · Rhinitis  · Tear of retina without detachment - Right Eye  · EPIPHORA  · Dry eyes  · Pseudophakia - Both Eyes  · Vitamin D deficiency  · Anemia  · Difficulty walking  · Lower extremity pain  · Back pain  · Nephrolithiasis  · Hypertensive heart disease without heart failure  · Anxiety  · Stress  · Hyponatremia  · Class 1 obesity due to excess calories with serious comorbidity and body mass index (BMI) of 32.0 to 32.9 in adult  · Memory difficulties  · Poor balance, onset 2014  · Status post coronary artery stent placement  · BMI 33.0-33.9,adult  · Abdominal obesity  · Sedentary lifestyle  · Chronic bilateral low back pain with left-sided sciatica  · Excessive daytime sleepiness, refuse sleep study  · Hyperlipidemia  · Stage 3a chronic kidney disease  · Osteoarthritis of multiple joints  · Mixed conductive and sensorineural hearing loss of both ears  · Atypical chest pain, onset 2007  · History of left-sided carotid endarterectomy  · Dizziness, onset 1985  · Bruit of right carotid artery  · Gouty tophi  · Statin intolerance  · Generalized body aches, onset 2010  · Cognitive dysfunction  · Personal history of noncompliance with medical treatment, presenting hazards to health  · Prerenal azotemia  · Bradycardia  · Chronic pain syndrome  · Neuropathy  · Burning sensation of lower extremity  · Frail elderly  · Idiopathic peripheral neuropathy  · Aortic " heart murmur  · Impaired functional mobility, balance, gait, and endurance  · 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 Padilla with D[facility]atology [facility]- has been seeing her for years. Last seen 5/2023  Reports attempted to contact office for an appointment for a lesion of the skin that has become more bothersome/sensitive to touch since her last office visit.        Review of Systems    Patient Active Problem List   Diagnosis    Coronary artery disease of native artery of native heart with stable angina pectoris    Elevated fasting glucose    Hypercalcemia    Mixed hyperlipidemia, baseline LDL not available    HTN (hypertension), benign    Rhinitis    Tear of retina without detachment - Right Eye    EPIPHORA    Dry eyes    Pseudophakia - Both Eyes    Vitamin D deficiency    Anemia    Difficulty walking    Lower extremity pain    Back pain    Nephrolithiasis    Hypertensive heart disease without heart failure    Anxiety    Stress    Hyponatremia    Class 1 obesity due to excess calories with serious comorbidity and body mass index (BMI) of 32.0 to 32.9 in adult    Memory difficulties    Poor balance, onset 2014    Status post coronary artery stent placement    BMI 33.0-33.9,adult    Abdominal obesity    Sedentary lifestyle    Chronic bilateral low back pain with left-sided sciatica    Excessive daytime sleepiness, refuse sleep study    Hyperlipidemia    Stage 3a chronic kidney disease    Osteoarthritis of multiple joints    Mixed conductive and sensorineural hearing loss of both ears    Atypical chest pain, onset 2007    History of left-sided carotid endarterectomy    Dizziness, onset 1985    Bruit of right carotid artery    Gouty tophi    Statin intolerance    Generalized body aches, onset 2010    Cognitive dysfunction    Personal history of noncompliance with medical treatment, presenting hazards to health    Prerenal azotemia    Bradycardia    Chronic pain syndrome     "Neuropathy    Burning sensation of lower extremity    Frail elderly    Idiopathic peripheral neuropathy    Aortic heart murmur    Impaired functional mobility, balance, gait, and endurance    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   Constitutional:       General: She is not in acute distress.     Appearance: Normal appearance. She is not ill-appearing.   Cardiovascular:      Rate and Rhythm: Normal rate and regular rhythm.      Heart sounds: Murmur ((holosystolic)) heard.   Pulmonary:      Effort: Pulmonary effort is normal. No respiratory distress.      Breath sounds: Normal breath sounds. No wheezing.   Musculoskeletal:      Comments: Ambulates with walker   Skin:     General: Skin is warm and dry.      Findings: Lesion present. No rash.      Comments: See attached image    Neurological:      Mental Status: She is alert.   Psychiatric:         Mood and Affect: Mood normal.         Behavior: Behavior normal.               Lab Results   Component Value Date    WBC 6.92 03/21/2024    HGB 13.9 03/21/2024    HCT 40.3 03/21/2024     03/21/2024    CHOL 197 02/01/2023    TRIG 118 02/01/2023    HDL 54 02/01/2023    ALT 11 05/03/2024    AST 15 05/03/2024     05/03/2024    K 4.6 05/03/2024     (H) 05/03/2024    CREATININE 1.3 05/03/2024    BUN 23 05/03/2024    CO2 23 05/03/2024    TSH 1.400 05/03/2024    HGBA1C 5.4 05/03/2024     The ASCVD Risk score ([place] DK, et al., 2019) failed to calculate for the following reasons:    The 2019 ASCVD risk score is only valid for ages 40 to 79  Visit Vitals  /76 (BP Location: Left arm, Patient Position: Sitting, BP Method: Medium (Manual))   Pulse 64   Resp 16   Ht 5' 4" (1.626 m)   Wt 79.4 kg (175 lb)   SpO2 98%   BMI 30.04 kg/m²      Assessment:       1. Skin lesion of left lower extremity        Plan:       1. Skin lesion of left lower extremity  -     Ambulatory referral/consult " to Dermatology; Future; Expected date: 08/08/2024  Openings available for requested provider in alternative office ([place]) as soon as today, unable to travel to [place]- requesting Pass Office only- other providers available. Only will see Dr. Padilla.   New referral submitted- referral received/confirmed. Patient provided with contact information to reach out to office to schedule    No follow-ups on file.      Future Appointments       Date Provider Specialty Appt Notes    8/1/2024 Desiree Canchola, JON Family Medicine sore on left leg that is hard and red    8/9/2024 Sandee Mujica, PT Outpatient Rehab GAIT    8/15/2024 Sandee Mujica, PT Outpatient Rehab GAIT    8/22/2024 Sandee Mujica, PT Outpatient Rehab GAIT    8/29/2024 Sandee Mujica, PT Outpatient Rehab GAIT    9/4/2024 Rosie Harrison MD Family Medicine 4 mon f/u    9/5/2024 Sandee Mujica, PT Outpatient Rehab GAIT    9/12/2024 Sandee Mujica, PT Outpatient Rehab GAIT    10/8/2024 Raphael Franklin MD Cardiology Annual

## 2024-08-15 ENCOUNTER — CLINICAL SUPPORT (OUTPATIENT)
Dept: REHABILITATION | Facility: HOSPITAL | Age: 89
End: 2024-08-15
Payer: MEDICARE

## 2024-08-15 DIAGNOSIS — Z74.09 IMPAIRED FUNCTIONAL MOBILITY, BALANCE, GAIT, AND ENDURANCE: Primary | ICD-10-CM

## 2024-08-15 PROCEDURE — 97112 NEUROMUSCULAR REEDUCATION: CPT | Mod: PN

## 2024-08-15 PROCEDURE — 97140 MANUAL THERAPY 1/> REGIONS: CPT | Mod: PN

## 2024-08-15 NOTE — PROGRESS NOTES
[facility]   Physical Therapy Treatment Note      Name: Yisel Villa [facility]  Clinic Number: [id number]    Therapy Diagnosis:   Encounter Diagnosis   Name Primary?    Impaired functional mobility, balance, gait, and endurance Yes     Physician: Rosie Harrison MD    Visit Date: 8/15/2024    Physician Orders: PT Eval and Treat   Medical Diagnosis from Referral:   Diagnosis   R26.81 (ICD-10-CM) - Unsteady gait when walking      Evaluation Date: 5/24/2024  Authorization Period Expiration: 12/31/2024  Plan of Care Expiration: 8/24/2024  Progress Note Due: 6/24/2024  Date of Surgery: n/a  Visit # / Visits authorized: 4 / 12   FOTO: 1/ 3     Precautions: Standard and Fall      Time In: 11:15 AM  Time Out: 12:00 PM  Total Billable Time: 40 minutes    PTA Visit #: 0/5       Subjective     Patient reports: I know I haven't been here in a few weeks, I just haven't felt up to it.  Did go to see Dr. Vital (ENT) he gave her some written instructions for exercises to possibly help with her positional vertigo.     She was not compliant with home exercise program as one has not been issued  Response to previous treatment: Yisel stated that working on her neck gives her more relief than anything else.   Functional change: N/A    Pain: 4/10  Location:  back      Objective      Objective Measures updated at progress report unless specified.     Treatment     Yisel received the treatments listed below:      therapeutic exercises to develop strength, endurance, and ROM for  minutes including:  Nustep level 4 x 15 min  Heel Raises x 15  LAQ x 15  Seated Marching x 15  Seated Hip Abd w/ YTB x 15  Seated Ball Squeezes x 2 min  Sit to Stand x 10    manual therapy techniques:  were applied to the: cervical spine  for 30  minutes, including:  Supine with upper trunk supported in some flexion - used wedge: cervical sub-occipital inhibition with light traction applied - pressure applied to center of spine over C1/C2  "SP- patient reporting improvement in her symptoms; OA flexion to address restriction; right and left upper trap/levator stretching on each side; Performed some light cervical traction again with end range hold as patient felt relief of her symptoms from this.  Performed left head rotation - symptoms subside then quickly rotated to right - symptoms subside - repeated this to perform log roll technique for moving crystals; Assisted patient to sitting at edge of mat - reporting less dizziness with his transfer after manual tx.       HEP: Reviewed this again with patient - she forgot that I gave this to her - did not perform.  X 10 mins   VOR 1:  look at object - focus on it and move head up/down; move head from right to left - keep focus on object while moving head  Gave patient printed copy of these exercises. Able to demonstrate ability to perform       therapeutic activities to improve functional performance for   minutes, including:  Toe Taps on 4" step x 2 min  Step-ups on 4" step x 10 each  Heel Cord stretch on wedge 3 x 30 sec  StandIng Hip Flex/Abd/Ext x 10 each    gait training to improve functional mobility and safety for   minutes, including:        Patient Education and Home Exercises       Education provided:   - gave patient VOR 1 exercises; explained cervicogenic dizziness to patient - feel this is a good deal of her issues, as well as hearing loss, and history of Meniere's disease.      Written Home Exercises Provided:  HEP will b issued as visits progress . Exercises were reviewed and Yisel was able to demonstrate them prior to the end of the session.  Yisel demonstrated fair understanding of the education provided. See Electronic Medical Record under Patient Instructions for exercises provided during therapy sessions    Assessment     Patient reporting improvement in her dizziness symptoms following treatment today; Will continue 1x/week for next 4-6 weeks to address cervicalgia and vestibular " deficits to see if we can improve patient's symptoms and mobility as a result.  Inconsistent with appointments, inconsistent with HEP.     Yisel Is progressing well towards her goals.   Patient prognosis is Fair.     Patient will continue to benefit from skilled outpatient physical therapy to address the deficits listed in the problem list box on initial evaluation, provide pt/family education and to maximize pt's level of independence in the home and community environment.     Patient's spiritual, cultural and educational needs considered and pt agreeable to plan of care and goals.     Anticipated barriers to physical therapy: Attendance at therapy sessions    Goals:   Short Term Goals: 3 weeks   Demonstrate improvement in recent symptoms to progress toward her long term goals   Able to avoid any falls or LOB at home   Compliant with HEP      Long Term Goals: 6 weeks   Reduction in # of dizzy episodes to no more than 2-3 per week   Increase ambulation distance with her RW to 300ft   FOTO balance score improved to 46  Independent with HEP     Plan       Plan of care Certification: 5/24/2024 to 8/24/2024.     Outpatient Physical Therapy 1-2 times weekly for 6 weeks to include the following interventions: Gait Training, Manual Therapy, Neuromuscular Re-ed, Patient Education, Therapeutic Activities, and Therapeutic Exercise.     Sandee Mujica, PT

## 2024-08-22 ENCOUNTER — CLINICAL SUPPORT (OUTPATIENT)
Dept: REHABILITATION | Facility: HOSPITAL | Age: 89
End: 2024-08-22
Payer: MEDICARE

## 2024-08-22 DIAGNOSIS — Z74.09 IMPAIRED FUNCTIONAL MOBILITY, BALANCE, GAIT, AND ENDURANCE: Primary | ICD-10-CM

## 2024-08-22 PROCEDURE — 97112 NEUROMUSCULAR REEDUCATION: CPT | Mod: PN

## 2024-08-22 PROCEDURE — 97110 THERAPEUTIC EXERCISES: CPT | Mod: PN

## 2024-08-22 NOTE — PROGRESS NOTES
[facility]   Physical Therapy Treatment Note      Name: Yisel Villa [facility]  Clinic Number: [id number]    Therapy Diagnosis:   Encounter Diagnosis   Name Primary?    Impaired functional mobility, balance, gait, and endurance Yes     Physician: Rosie Harrison MD    Visit Date: 8/22/2024    Physician Orders: PT Eval and Treat   Medical Diagnosis from Referral:   Diagnosis   R26.81 (ICD-10-CM) - Unsteady gait when walking      Evaluation Date: 5/24/2024  Authorization Period Expiration: 12/31/2024  Plan of Care Expiration: 8/24/2024  Progress Note Due: 6/24/2024  Date of Surgery: n/a  Visit # / Visits authorized: 5 / 12   FOTO: 1/ 3     Precautions: Standard and Fall      Time In: 11:15 AM  Time Out: 12:00 PM  Total Billable Time: 40 minutes    PTA Visit #: 0/5       Subjective     Patient reports: I had terrible dizziness while I was playing bridge yesterday, this usually doesn't happen when I'm out of the house; I'm still feeling a little dizzy today.   She was not compliant with home exercise program as one has not been issued  Response to previous treatment: Yisel stated that working on her neck gives her more relief than anything else.   Functional change: N/A    Pain: 4/10  Location:  back      Objective      Objective Measures updated at progress report unless specified.     Treatment     Yisel received the treatments listed below:      therapeutic exercises to develop strength, endurance, and ROM for  minutes including:  Nustep level 4 x 15 min  Heel Raises x 15  LAQ x 15  Seated Marching x 15  Seated Hip Abd w/ YTB x 15  Seated Ball Squeezes x 2 min  Sit to Stand x 10    manual therapy techniques:  were applied to the: cervical spine  for 30  minutes, including:  Supine with upper trunk supported in some flexion - used wedge: cervical sub-occipital inhibition with light traction applied - pressure applied to center of spine over C1/C2 SP- patient reporting improvement in her  "symptoms; OA flexion to address restriction; right and left upper trap/levator stretching on each side; Performed some light cervical traction again with end range hold as patient felt relief of her symptoms from this.  Performed left head rotation - symptoms subside then quickly rotated to right - symptoms subside - repeated this to perform log roll technique for moving crystals; Assisted patient to sitting at edge of mat - reporting less dizziness with his transfer after manual tx.       HEP: Reviewed this again with patient - she forgot that I gave this to her - did not perform.  X 10 mins   VOR 1:  look at object - focus on it and move head up/down; move head from right to left - keep focus on object while moving head  Gave patient printed copy of these exercises. Able to demonstrate ability to perform       therapeutic activities to improve functional performance for   minutes, including:  Toe Taps on 4" step x 2 min  Step-ups on 4" step x 10 each  Heel Cord stretch on wedge 3 x 30 sec  StandIng Hip Flex/Abd/Ext x 10 each    gait training to improve functional mobility and safety for   minutes, including:        Patient Education and Home Exercises       Education provided:   - gave patient VOR 1 exercises; explained cervicogenic dizziness to patient - feel this is a good deal of her issues, as well as hearing loss, and history of Meniere's disease.      Written Home Exercises Provided:  HEP will b issued as visits progress . Exercises were reviewed and Yisle was able to demonstrate them prior to the end of the session.  Yisel demonstrated fair understanding of the education provided. See Electronic Medical Record under Patient Instructions for exercises provided during therapy sessions    Assessment     Patient reporting improvement in her dizziness symptoms following treatment today; Will continue 1x/week for next 4-6 weeks to address cervicalgia and vestibular deficits to see if we can improve patient's " symptoms and mobility as a result.  Inconsistent with appointments, inconsistent with HEP.     Yisel Is progressing well towards her goals.   Patient prognosis is Fair.     Patient will continue to benefit from skilled outpatient physical therapy to address the deficits listed in the problem list box on initial evaluation, provide pt/family education and to maximize pt's level of independence in the home and community environment.     Patient's spiritual, cultural and educational needs considered and pt agreeable to plan of care and goals.     Anticipated barriers to physical therapy: Attendance at therapy sessions    Goals:   Short Term Goals: 3 weeks   Demonstrate improvement in recent symptoms to progress toward her long term goals   Able to avoid any falls or LOB at home   Compliant with HEP      Long Term Goals: 6 weeks   Reduction in # of dizzy episodes to no more than 2-3 per week   Increase ambulation distance with her RW to 300ft   FOTO balance score improved to 46  Independent with HEP     Plan       Plan of care Certification: 5/24/2024 to 8/24/2024.     Outpatient Physical Therapy 1-2 times weekly for 6 weeks to include the following interventions: Gait Training, Manual Therapy, Neuromuscular Re-ed, Patient Education, Therapeutic Activities, and Therapeutic Exercise.     Sandee Mujica, PT

## 2024-08-29 ENCOUNTER — CLINICAL SUPPORT (OUTPATIENT)
Dept: REHABILITATION | Facility: HOSPITAL | Age: 89
End: 2024-08-29
Payer: MEDICARE

## 2024-08-29 DIAGNOSIS — Z74.09 IMPAIRED FUNCTIONAL MOBILITY, BALANCE, GAIT, AND ENDURANCE: Primary | ICD-10-CM

## 2024-08-29 PROCEDURE — 97110 THERAPEUTIC EXERCISES: CPT | Mod: PN

## 2024-08-29 PROCEDURE — 97140 MANUAL THERAPY 1/> REGIONS: CPT | Mod: PN

## 2024-08-29 NOTE — PROGRESS NOTES
[facility]   Physical Therapy Treatment Note      Name: Yisel Villa [facility]  Clinic Number: [id number]    Therapy Diagnosis:   Encounter Diagnosis   Name Primary?    Impaired functional mobility, balance, gait, and endurance Yes     Physician: Rosie Harrison MD    Visit Date: 8/29/2024    Physician Orders: PT Eval and Treat   Medical Diagnosis from Referral:   Diagnosis   R26.81 (ICD-10-CM) - Unsteady gait when walking      Evaluation Date: 5/24/2024  Authorization Period Expiration: 12/31/2024  Plan of Care Expiration: 8/24/2024  Progress Note Due: 6/24/2024  Date of Surgery: n/a  Visit # / Visits authorized: 6 / 12   FOTO: 1/ 3     Precautions: Standard and Fall      Time In: 11:15 AM  Time Out: 12:00 PM  Total Billable Time: 40 minutes    PTA Visit #: 0/5       Subjective     Patient reports: feeling better today; less dizziness noted over the past week.   She was not compliant with home exercise program as one has not been issued  Response to previous treatment: Yisel stated that working on her neck gives her more relief than anything else.   Functional change: less frequency of dizziness     Pain: 4/10  Location:  back      Objective      Objective Measures updated at progress report unless specified.     Treatment     Yisel received the treatments listed below:      therapeutic exercises to develop strength, endurance, and ROM for  minutes including:  Nustep level 4 x 15 min  Heel Raises x 15  LAQ x 15  Seated Marching x 15  Seated Hip Abd w/ YTB x 15  Seated Ball Squeezes x 2 min  Sit to Stand x 10    manual therapy techniques:  were applied to the: cervical spine  for 30  minutes, including:  Supine with head/shoulder slightly elevated: sub-occipital inhibition - long duration to address forward head and myofascial restrictions; mild friction massage occipital nerve at notch to free up any adhesions that could contribute to dizziness; Slow, steady PROM of head/neck into rotation -  "right and left with contract /relax of mm for improved motion. Upper trap/levator STM and stretch on right and left.    Had patient sit at edge of mat: performed AROM ex with light pull by patient to add to stretch - rotation, flexion and SB able to perform all without dizziness.   Shoulder rolls - both directions in sitting; scapular squeezes in sitting.     Written H/O of exercise above given to patient.     HEP: doing well with these exercises at home.   VOR 1:  look at object - focus on it and move head up/down; move head from right to left - keep focus on object while moving head  Gave patient printed copy of these exercises. Able to demonstrate ability to perform       therapeutic activities to improve functional performance for   minutes, including:  Toe Taps on 4" step x 2 min  Step-ups on 4" step x 10 each  Heel Cord stretch on wedge 3 x 30 sec  StandIng Hip Flex/Abd/Ext x 10 each    gait training to improve functional mobility and safety for   minutes, including:        Patient Education and Home Exercises       Education provided:   -cervical AROM exerc.   - gave patient VOR 1 exercises; explained cervicogenic dizziness to patient - feel this is a good deal of her issues, as well as hearing loss, and history of Meniere's disease.      Written Home Exercise Written HEP given to patient  Exercises were reviewed and Yisel was able to demonstrate them prior to the end of the session.  Yisel demonstrated fair understanding of the education provided. See Electronic Medical Record under Patient Instructions for exercises provided during therapy sessions    Assessment     Patient reporting improvement in her dizziness symptoms following treatment last week and further improved with treatment today.  Able to add in some neck exercises today without exacerbation of her symptoms.   Will continue 1x/week for next 4-6 weeks to address cervicalgia and vestibular deficits to see if we can improve patient's symptoms and " mobility as a result.  Inconsistent with appointments, inconsistent with HEP.     Yisel Is progressing well towards her goals.   Patient prognosis is Fair.     Patient will continue to benefit from skilled outpatient physical therapy to address the deficits listed in the problem list box on initial evaluation, provide pt/family education and to maximize pt's level of independence in the home and community environment.     Patient's spiritual, cultural and educational needs considered and pt agreeable to plan of care and goals.     Anticipated barriers to physical therapy: Attendance at therapy sessions    Goals:   Short Term Goals: 3 weeks   Demonstrate improvement in recent symptoms to progress toward her long term goals   Able to avoid any falls or LOB at home   Compliant with HEP      Long Term Goals: 6 weeks   Reduction in # of dizzy episodes to no more than 2-3 per week   Increase ambulation distance with her RW to 300ft   FOTO balance score improved to 46  Independent with HEP     Plan       Plan of care Certification: 5/24/2024 to 8/24/2024. - update POC      Outpatient Physical Therapy 1-2 times weekly for 6 weeks to include the following interventions: Gait Training, Manual Therapy, Neuromuscular Re-ed, Patient Education, Therapeutic Activities, and Therapeutic Exercise.     Sandee Mujica, PT

## 2024-09-04 ENCOUNTER — OFFICE VISIT (OUTPATIENT)
Dept: FAMILY MEDICINE | Facility: CLINIC | Age: 89
End: 2024-09-04
Payer: MEDICARE

## 2024-09-04 VITALS
DIASTOLIC BLOOD PRESSURE: 70 MMHG | HEART RATE: 50 BPM | SYSTOLIC BLOOD PRESSURE: 128 MMHG | HEIGHT: 64 IN | WEIGHT: 175 LBS | BODY MASS INDEX: 29.88 KG/M2 | OXYGEN SATURATION: 98 % | RESPIRATION RATE: 18 BRPM

## 2024-09-04 DIAGNOSIS — E78.5 HYPERLIPIDEMIA, UNSPECIFIED HYPERLIPIDEMIA TYPE: ICD-10-CM

## 2024-09-04 DIAGNOSIS — Z23 NEED FOR VACCINATION: ICD-10-CM

## 2024-09-04 DIAGNOSIS — I10 HTN (HYPERTENSION), BENIGN: Primary | ICD-10-CM

## 2024-09-04 DIAGNOSIS — E55.9 VITAMIN D DEFICIENCY: ICD-10-CM

## 2024-09-04 DIAGNOSIS — N39.3 STRESS INCONTINENCE OF URINE: ICD-10-CM

## 2024-09-04 PROBLEM — R32 URINARY INCONTINENCE: Status: ACTIVE | Noted: 2024-09-04

## 2024-09-04 PROCEDURE — G0008 ADMIN INFLUENZA VIRUS VAC: HCPCS | Mod: S$GLB,,, | Performed by: FAMILY MEDICINE

## 2024-09-04 PROCEDURE — 1101F PT FALLS ASSESS-DOCD LE1/YR: CPT | Mod: CPTII,S$GLB,, | Performed by: FAMILY MEDICINE

## 2024-09-04 PROCEDURE — 99999 PR PBB SHADOW E&M-EST. PATIENT-LVL III: CPT | Mod: PBBFAC,,, | Performed by: FAMILY MEDICINE

## 2024-09-04 PROCEDURE — 3288F FALL RISK ASSESSMENT DOCD: CPT | Mod: CPTII,S$GLB,, | Performed by: FAMILY MEDICINE

## 2024-09-04 PROCEDURE — 90653 IIV ADJUVANT VACCINE IM: CPT | Mod: S$GLB,,, | Performed by: FAMILY MEDICINE

## 2024-09-04 PROCEDURE — 1159F MED LIST DOCD IN RCRD: CPT | Mod: CPTII,S$GLB,, | Performed by: FAMILY MEDICINE

## 2024-09-04 PROCEDURE — 99214 OFFICE O/P EST MOD 30 MIN: CPT | Mod: 25,S$GLB,, | Performed by: FAMILY MEDICINE

## 2024-09-04 RX ORDER — ERGOCALCIFEROL 1.25 MG/1
50000 CAPSULE ORAL
Qty: 4 CAPSULE | Refills: 11 | Status: SHIPPED | OUTPATIENT
Start: 2024-09-04

## 2024-09-04 NOTE — PROGRESS NOTES
Subjective:       Patient ID: Yisel العلي is a 89 y.o. female.    Chief Complaint: No chief complaint on file.    Presents today for 4 month follow up.     She tells me she has things that she can talk about. She has gout in her toes and only takes an as needed medication. Currently with no pain.     She tells me that every now and then she gets a very red and hot right leg. She does not know what is causing this.     She also has a spot on her lateral left leg. She tells me it was really large and is shrinking on the left side of her left knee. She has an appt with her dermatologist coming up. - Dr. Padilla.     Needs some refills.     She tells me that she has severe neuropathy in her hands. She says that it is not pain but it is numbness.  She is wondering if she can regenerate her nerves.         .  Patient Active Problem List   Diagnosis    Coronary artery disease of native artery of native heart with stable angina pectoris    Elevated fasting glucose    Hypercalcemia    Mixed hyperlipidemia, baseline LDL not available    HTN (hypertension), benign    Rhinitis    Tear of retina without detachment - Right Eye    EPIPHORA    Dry eyes    Pseudophakia - Both Eyes    Vitamin D deficiency    Anemia    Difficulty walking    Lower extremity pain    Back pain    Nephrolithiasis    Hypertensive heart disease without heart failure    Anxiety    Stress    Hyponatremia    Class 1 obesity due to excess calories with serious comorbidity and body mass index (BMI) of 32.0 to 32.9 in adult    Memory difficulties    Poor balance, onset 2014    Status post coronary artery stent placement    BMI 33.0-33.9,adult    Abdominal obesity    Sedentary lifestyle    Chronic bilateral low back pain with left-sided sciatica    Excessive daytime sleepiness, refuse sleep study    Hyperlipidemia    Stage 3a chronic kidney disease    Osteoarthritis of multiple joints    Mixed conductive and sensorineural hearing loss of both ears     Atypical chest pain, onset 2007    History of left-sided carotid endarterectomy    Dizziness, onset 1985    Bruit of right carotid artery    Gouty tophi    Statin intolerance    Generalized body aches, onset 2010    Cognitive dysfunction    Personal history of noncompliance with medical treatment, presenting hazards to health    Prerenal azotemia    Bradycardia    Chronic pain syndrome    Neuropathy    Burning sensation of lower extremity    Frail elderly    Idiopathic peripheral neuropathy    Aortic heart murmur    Impaired functional mobility, balance, gait, and endurance    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    Review of Systems   Constitutional:  Negative for activity change, appetite change, fatigue and fever.   Respiratory:  Negative for shortness of breath.    Gastrointestinal:  Negative for abdominal pain.   Integumentary:  Negative for rash.         Health Maintenance Due   Topic Date Due    Aspirin/Antiplatelet Therapy  Never done    Shingles Vaccine (1 of 2) Never done    Influenza Vaccine (1) 09/01/2024    COVID-19 Vaccine (4 - 2023-24 season) 09/01/2024      Health Maintenance reviewed and discussed- flu shot today   Objective:      Physical Exam  Vitals and nursing note reviewed.   Constitutional:       General: She is not in acute distress.     Appearance: She is not ill-appearing.   Cardiovascular:      Rate and Rhythm: Normal rate and regular rhythm.      Heart sounds: No murmur heard.  Pulmonary:      Effort: Pulmonary effort is normal.      Breath sounds: Normal breath sounds. No wheezing.   Skin:     General: Skin is warm and dry.      Findings: Lesion (left lateral knee skin lesion.) present. No rash.   Neurological:      Mental Status: She is alert.   Psychiatric:         Mood and Affect: Mood normal.          Behavior: Behavior normal.         Assessment:       1. HTN (hypertension), benign    2. Vitamin D deficiency    3. Hyperlipidemia, unspecified hyperlipidemia type    4. Stress incontinence of urine        Plan:       1. HTN (hypertension), benign  Assessment & Plan:  Stable. Well controlled. Continue current medications.       Orders:  -     Comprehensive metabolic panel; Future; Expected date: 09/04/2024    2. Vitamin D deficiency  -     Vitamin D; Future; Expected date: 09/04/2024  -     ergocalciferol (ERGOCALCIFEROL) 50,000 unit Cap; Take 1 capsule (50,000 Units total) by mouth every 7 days. Take one by mouth every Monday and Friday  Dispense: 4 capsule; Refill: 11    3. Hyperlipidemia, unspecified hyperlipidemia type  Assessment & Plan:  Stable. Well controlled. Continue current medications.         4. Stress incontinence of urine  Assessment & Plan:  Wears diapers. Can prescribe if needed.

## 2024-09-05 ENCOUNTER — CLINICAL SUPPORT (OUTPATIENT)
Dept: REHABILITATION | Facility: HOSPITAL | Age: 89
End: 2024-09-05
Payer: MEDICARE

## 2024-09-05 DIAGNOSIS — Z74.09 IMPAIRED FUNCTIONAL MOBILITY, BALANCE, GAIT, AND ENDURANCE: Primary | ICD-10-CM

## 2024-09-05 PROCEDURE — 97140 MANUAL THERAPY 1/> REGIONS: CPT | Mod: KX,PN

## 2024-09-05 PROCEDURE — 97110 THERAPEUTIC EXERCISES: CPT | Mod: KX,PN

## 2024-09-05 NOTE — PROGRESS NOTES
[facility]   Physical Therapy Treatment Note/ updated POC       Name: Yisel العلي  Clinic Number: [id number]    Therapy Diagnosis:   Encounter Diagnosis   Name Primary?    Impaired functional mobility, balance, gait, and endurance Yes     Physician: Rosie Harrison MD    Visit Date: 9/5/2024    Physician Orders: PT Eval and Treat   Medical Diagnosis from Referral:   Diagnosis   R26.81 (ICD-10-CM) - Unsteady gait when walking      Evaluation Date: 5/24/2024  Authorization Period Expiration: 12/31/2024  Plan of Care Expiration: 11/24/2024   Progress Note Due: 10/15/2024   Date of Surgery: n/a  Visit # / Visits authorized: 7 / 12   FOTO: 2/ 3     Precautions: Standard and Fall      Time In: 11:15 AM  Time Out: 12:00 PM  Total Billable Time: 40 minutes    PTA Visit #: 0/5       Subjective     Patient reports: feeling better today, performing exercises at home; still has some dizziness, but not the debilitating dizziness she had a couple of weeks ago.   She was compliant with home exercise program.   Response to previous treatment: Yisel stated that working on her neck gives her more relief than anything else.   Functional change: less frequency of dizziness, feels exercises are helpful      Pain: 4/10  Location:  back   - chronic     Objective      Objective Measures updated at progress report unless specified.     Feel dizziness is combination of factors - yes, she has increased dizziness with sit <> supine ( brief) dizziness with supine left head rotation and turning onto left side - but demonstrates no nystagmus which is classic BPPV sign; The dizziness with supine left rotation is brief,  In sitting: no dizziness with left or right cervical rotation; no dizziness with cervical flexion, minimal extension - ROM is limited in all planes of cervical movement secondary to degenerative changes/stenosis in spine.    Yisel has responded very well to manual tx for her cervical spine - joint  "mobilization and MFR.   Started on VOR 1 exercises for improved ocular/vestibular reflex as well as cervical ROM, scapular squeezes in sitting position - all of this has improved her c/o's.     Treatment     Yisel received the treatments listed below:      therapeutic exercises to develop strength, endurance, and ROM for 12 minutes including:  Had patient sit at edge of mat: performed AROM ex with light pull by patient to add to stretch - rotation, flexion and SB able to perform all without dizziness.   Shoulder rolls - both directions in sitting; scapular squeezes in sitting.     DNP  Nustep level 4 x 15 min  Heel Raises x 15  LAQ x 15  Seated Marching x 15  Seated Hip Abd w/ YTB x 15  Seated Ball Squeezes x 2 min  Sit to Stand x 10    manual therapy techniques:  were applied to the: cervical spine  for 30  minutes, including:  Supine with head/shoulder slightly elevated: sub-occipital inhibition - long duration to address forward head and myofascial restrictions; mild friction massage occipital nerve at notch to free up any adhesions that could contribute to dizziness; Slow, steady PROM of head/neck into rotation - right and left with contract /relax of mm for improved motion. Upper trap/levator STM and stretch on right and left.    Written H/O of exercise above given to patient.     HEP: doing well with these exercises at home.   VOR 1:  look at object - focus on it and move head up/down; move head from right to left - keep focus on object while moving head  Gave patient printed copy of these exercises. Able to demonstrate ability to perform     DNP  therapeutic activities to improve functional performance for   minutes, including:  Toe Taps on 4" step x 2 min  Step-ups on 4" step x 10 each  Heel Cord stretch on wedge 3 x 30 sec  StandIng Hip Flex/Abd/Ext x 10 each    gait training to improve functional mobility and safety for   minutes, including:        Patient Education and Home Exercises       Education " provided:   -cervical AROM exerc.   - gave patient VOR 1 exercises; explained cervicogenic dizziness to patient - feel this is a good deal of her issues, as well as hearing loss, and history of Meniere's disease.      Written Home Exercise Written HEP given to patient  Exercises were reviewed and Yisel was able to demonstrate them prior to the end of the session.  Yisel demonstrated fair understanding of the education provided. See Electronic Medical Record under Patient Instructions for exercises provided during therapy sessions    Assessment     Patient reporting improvement in her dizziness symptoms following treatment last week and further improved with treatment today.  Able to add in some neck exercises today without exacerbation of her symptoms.   Will continue 1x/week for next 4-6 weeks to address cervicalgia and vestibular deficits to see if we can improve patient's symptoms and mobility as a result.  Inconsistent with appointments, inconsistent with HEP.     Yisel Is progressing well towards her goals.   Patient prognosis is Fair.     Patient will continue to benefit from skilled outpatient physical therapy to address the deficits listed in the problem list box on initial evaluation, provide pt/family education and to maximize pt's level of independence in the home and community environment.     Patient's spiritual, cultural and educational needs considered and pt agreeable to plan of care and goals.     Anticipated barriers to physical therapy: Attendance at therapy sessions    Goals:   Short Term Goals: 3 weeks   Demonstrate improvement in recent symptoms to progress toward her long term goals > MET   Able to avoid any falls or LOB at home > Improving   Compliant with HEP > MET      Long Term Goals: 6 weeks   Reduction in # of dizzy episodes to no more than 2-3 per week   Increase ambulation distance with her RW to 300ft   FOTO balance score improved to 46  Independent with HEP     Plan       Updated  Plan of care Certification: 8/24/2024 to 11/24/2024     Outpatient Physical Therapy 1-2 times weekly for 6 weeks to include the following interventions: Gait Training, Manual Therapy, Neuromuscular Re-ed, Patient Education, Therapeutic Activities, and Therapeutic Exercise.     Sandee Mujica, PT

## 2024-09-12 ENCOUNTER — TELEPHONE (OUTPATIENT)
Dept: FAMILY MEDICINE | Facility: CLINIC | Age: 89
End: 2024-09-12
Payer: MEDICARE

## 2024-09-12 DIAGNOSIS — E55.9 VITAMIN D DEFICIENCY: ICD-10-CM

## 2024-09-12 NOTE — TELEPHONE ENCOUNTER
Please confirm directions on ERGOCALCIFEROL) 50,000 unit Cap      Take 1 capsule (50,000 Units total) by mouth every 7 days. Take one by mouth every Monday and Friday

## 2024-09-12 NOTE — TELEPHONE ENCOUNTER
----- Message from Glory Jean sent at 9/12/2024  4:25 PM CDT -----  Contact: karon rodriguez  Type:  Pharmacy Calling to Clarify an RX    Pharmacy Name:[facility]    Prescription Name:ergocalciferol (ERGOCALCIFEROL) 50,000 unit Cap    What do they need to clarify?: conflicting directions    Best Call Back Number:     Additional Information: ref# [id number]    Fax # [phone]    Please call to advise    Thanks

## 2024-09-13 RX ORDER — ERGOCALCIFEROL 1.25 MG/1
50000 CAPSULE ORAL
Qty: 12 CAPSULE | Refills: 1 | Status: SHIPPED | OUTPATIENT
Start: 2024-09-13

## 2024-09-26 ENCOUNTER — TELEPHONE (OUTPATIENT)
Dept: FAMILY MEDICINE | Facility: CLINIC | Age: 89
End: 2024-09-26
Payer: MEDICARE

## 2024-09-26 NOTE — TELEPHONE ENCOUNTER
----- Message from Lisa Duncan sent at 9/26/2024  1:17 PM CDT -----  Regarding: same day  Contact: pt  Type:  Sooner Appointment Request    Caller is requesting a sooner appointment.  Caller declined first available appointment listed below.  Caller will not accept being placed on the waitlist and is requesting a message be sent to doctor.    Name of Caller:  patient   When is the first available appointment?    Symptoms:  red spot on leg  Would the patient rather a call back or a response via [facility]?   Best Call Back Number:  [phone]    Additional Information:  possible blood vessel seeking to be seen today.

## 2024-10-05 DIAGNOSIS — I25.118 CORONARY ARTERY DISEASE OF NATIVE ARTERY OF NATIVE HEART WITH STABLE ANGINA PECTORIS: ICD-10-CM

## 2024-10-05 DIAGNOSIS — Z95.5 STATUS POST CORONARY ARTERY STENT PLACEMENT: ICD-10-CM

## 2024-10-05 DIAGNOSIS — I11.9 HYPERTENSIVE HEART DISEASE WITHOUT HEART FAILURE: ICD-10-CM

## 2024-10-05 DIAGNOSIS — N18.30 CKD (CHRONIC KIDNEY DISEASE), STAGE III: ICD-10-CM

## 2024-10-05 DIAGNOSIS — Z98.890 HISTORY OF LEFT-SIDED CAROTID ENDARTERECTOMY: ICD-10-CM

## 2024-10-07 ENCOUNTER — TELEPHONE (OUTPATIENT)
Dept: CARDIOLOGY | Facility: CLINIC | Age: 89
End: 2024-10-07
Payer: MEDICARE

## 2024-10-08 ENCOUNTER — TELEPHONE (OUTPATIENT)
Dept: CARDIOLOGY | Facility: CLINIC | Age: 89
End: 2024-10-08
Payer: MEDICARE

## 2024-10-08 ENCOUNTER — OFFICE VISIT (OUTPATIENT)
Dept: CARDIOLOGY | Facility: CLINIC | Age: 89
End: 2024-10-08
Payer: MEDICARE

## 2024-10-08 VITALS
WEIGHT: 175 LBS | DIASTOLIC BLOOD PRESSURE: 62 MMHG | HEIGHT: 64 IN | OXYGEN SATURATION: 96 % | SYSTOLIC BLOOD PRESSURE: 140 MMHG | BODY MASS INDEX: 29.88 KG/M2 | HEART RATE: 58 BPM

## 2024-10-08 DIAGNOSIS — R42 DIZZINESS: ICD-10-CM

## 2024-10-08 DIAGNOSIS — I10 HTN (HYPERTENSION), BENIGN: ICD-10-CM

## 2024-10-08 DIAGNOSIS — N18.30 CKD (CHRONIC KIDNEY DISEASE), STAGE III: ICD-10-CM

## 2024-10-08 DIAGNOSIS — F09 COGNITIVE DYSFUNCTION: ICD-10-CM

## 2024-10-08 DIAGNOSIS — I25.118 CORONARY ARTERY DISEASE OF NATIVE ARTERY OF NATIVE HEART WITH STABLE ANGINA PECTORIS: ICD-10-CM

## 2024-10-08 DIAGNOSIS — I11.9 HYPERTENSIVE HEART DISEASE WITHOUT HEART FAILURE: ICD-10-CM

## 2024-10-08 DIAGNOSIS — I25.118 CORONARY ARTERY DISEASE OF NATIVE ARTERY OF NATIVE HEART WITH STABLE ANGINA PECTORIS: Primary | ICD-10-CM

## 2024-10-08 DIAGNOSIS — Z95.5 STATUS POST CORONARY ARTERY STENT PLACEMENT: ICD-10-CM

## 2024-10-08 DIAGNOSIS — R54 FRAIL ELDERLY: ICD-10-CM

## 2024-10-08 DIAGNOSIS — Z98.890 HISTORY OF LEFT-SIDED CAROTID ENDARTERECTOMY: ICD-10-CM

## 2024-10-08 DIAGNOSIS — H91.90 HOH (HARD OF HEARING): ICD-10-CM

## 2024-10-08 PROCEDURE — 93000 ELECTROCARDIOGRAM COMPLETE: CPT | Mod: S$GLB,,, | Performed by: INTERNAL MEDICINE

## 2024-10-08 PROCEDURE — 3288F FALL RISK ASSESSMENT DOCD: CPT | Mod: CPTII,S$GLB,, | Performed by: INTERNAL MEDICINE

## 2024-10-08 PROCEDURE — 1126F AMNT PAIN NOTED NONE PRSNT: CPT | Mod: CPTII,S$GLB,, | Performed by: INTERNAL MEDICINE

## 2024-10-08 PROCEDURE — 99999 PR PBB SHADOW E&M-EST. PATIENT-LVL III: CPT | Mod: PBBFAC,,, | Performed by: INTERNAL MEDICINE

## 2024-10-08 PROCEDURE — 1101F PT FALLS ASSESS-DOCD LE1/YR: CPT | Mod: CPTII,S$GLB,, | Performed by: INTERNAL MEDICINE

## 2024-10-08 PROCEDURE — 1159F MED LIST DOCD IN RCRD: CPT | Mod: CPTII,S$GLB,, | Performed by: INTERNAL MEDICINE

## 2024-10-08 PROCEDURE — 99214 OFFICE O/P EST MOD 30 MIN: CPT | Mod: 25,S$GLB,, | Performed by: INTERNAL MEDICINE

## 2024-10-08 RX ORDER — LISINOPRIL 40 MG/1
40 TABLET ORAL DAILY
Qty: 90 TABLET | Refills: 3 | Status: CANCELLED | OUTPATIENT
Start: 2024-10-08

## 2024-10-08 RX ORDER — LISINOPRIL 40 MG/1
40 TABLET ORAL
Qty: 90 TABLET | Refills: 3 | OUTPATIENT
Start: 2024-10-08

## 2024-10-08 RX ORDER — LISINOPRIL 40 MG/1
40 TABLET ORAL DAILY
Qty: 90 TABLET | Refills: 3 | Status: SHIPPED | OUTPATIENT
Start: 2024-10-08

## 2024-10-08 NOTE — PROGRESS NOTES
Subjective:    Patient ID:  Yisel العلي is a 89 y.o. female who presents for evaluation of Annual Exam    for atypical CP and chronic dizziness, refuse statin due to muscle pain, annual review.  Came on own  PCP: Rosie Harrison MD   Gyn: Nikolai Simeon MD in [facility] cardiologist: Dr. Celeste, last seen in 3/2018  Pain: Madi Call MD  Physical M&R: Alix Rivera, DO  Lives alone, no pet, worry about tripping with poor balance for 5 years. Have moved to a big house in [facility], able to walk inside with walker   Daughter, Vandana, RN in [facility], do not have POA   Son, David, in [place],  do have POA, not medical  Retire teacher, [employer], play competitive bridge once a week    SDOH: Difficult historian with memory difficulties. [place], Here alone, daughter have to work, still drives.  Health literacy: Medium  Vaccinations: Completed COVID, had infection, mild    Activities: ADL's only, no regular exercise, limited by chronic dizziness but no falling, used to swim daily until COVID started.  Nicotine: Never  Alcohol: 1-2 beer /month, max 1 per day, now once or twice a month.  Illicit drugs: Denies, on Rx Valium but use only once in 6 months.  Cardiac symptoms: still with atypical CP  Home BP: Yes - below 140  Medication compliance: No, never started Zetia, did not proceed with Holter and CT head due to cost.  Diet: regular  Caffeine: 2-3 cups /day  Labs: 11/08/2018 & 04/12/2019: No Troponin and TSH; .0 not on Rx, own preference; A1C 5.6%; Sodium 135L; K+ 4.5; GFR 58L; Glucose 99; WBC 6.9; Hemoglobin 13.2; High Sensitivity 0.30  No TSH, Troponin, BNP:  .2H not on Rx;  A1C 5.6;  Sodium 135L;  K+ 4.4;  Glucose 136H;  GFR 58.5l;  WBC 5.34;  Hemoglobin 13.6;    Lab Results   Component Value Date    TSH 1.400 05/03/2024        Lab Results   Component Value Date    LABA1C 6.1 (H) 01/23/2015    HGBA1C 5.4 05/03/2024       Lab Results   Component Value Date     WBC 6.92 03/21/2024    HGB 13.9 03/21/2024    HCT 40.3 03/21/2024    MCV 91 03/21/2024     03/21/2024       CMP  Sodium   Date Value Ref Range Status   05/03/2024 142 136 - 145 mmol/L Final     Potassium   Date Value Ref Range Status   05/03/2024 4.6 3.5 - 5.1 mmol/L Final     Chloride   Date Value Ref Range Status   05/03/2024 111 (H) 95 - 110 mmol/L Final     CO2   Date Value Ref Range Status   05/03/2024 23 23 - 29 mmol/L Final     Glucose   Date Value Ref Range Status   05/03/2024 111 (H) 70 - 110 mg/dL Final     BUN   Date Value Ref Range Status   05/03/2024 23 8 - 23 mg/dL Final     Creatinine   Date Value Ref Range Status   05/03/2024 1.3 0.5 - 1.4 mg/dL Final     Calcium   Date Value Ref Range Status   05/03/2024 10.9 (H) 8.7 - 10.5 mg/dL Final     Total Protein   Date Value Ref Range Status   05/03/2024 7.0 6.0 - 8.4 g/dL Final     Albumin   Date Value Ref Range Status   05/03/2024 4.2 3.5 - 5.2 g/dL Final     Total Bilirubin   Date Value Ref Range Status   05/03/2024 0.3 0.1 - 1.0 mg/dL Final     Comment:     For infants and newborns, interpretation of results should be based  on gestational age, weight and in agreement with clinical  observations.    Premature Infant recommended reference ranges:  Up to 24 hours.............<8.0 mg/dL  Up to 48 hours............<12.0 mg/dL  3-5 days..................<15.0 mg/dL  6-29 days.................<15.0 mg/dL       Alkaline Phosphatase   Date Value Ref Range Status   05/03/2024 58 55 - 135 U/L Final     AST   Date Value Ref Range Status   05/03/2024 15 10 - 40 U/L Final     ALT   Date Value Ref Range Status   05/03/2024 11 10 - 44 U/L Final     Anion Gap   Date Value Ref Range Status   05/03/2024 8 8 - 16 mmol/L Final     eGFR if    Date Value Ref Range Status   04/08/2022 >60.0 >60 mL/min/1.73 m^2 Final     eGFR if non    Date Value Ref Range Status   04/08/2022 >60.0 >60 mL/min/1.73 m^2 Final     Comment:     Calculation  "used to obtain the estimated glomerular filtration  rate (eGFR) is the CKD-EPI equation.        @labrcntip(troponini)@  No results found for: "BNP"}   Lab Results   Component Value Date    CHOL 197 02/01/2023    CHOL 228 (H) 04/08/2022    CHOL 233 (H) 10/06/2020     Lab Results   Component Value Date    HDL 54 02/01/2023    HDL 53 04/08/2022    HDL 52 10/06/2020     Lab Results   Component Value Date    LDLCALC 119.4 02/01/2023    LDLCALC 132.4 04/08/2022    LDLCALC 148.0 10/06/2020     Lab Results   Component Value Date    TRIG 118 02/01/2023    TRIG 213 (H) 04/08/2022    TRIG 165 (H) 10/06/2020     Lab Results   Component Value Date    CHOLHDL 27.4 02/01/2023    CHOLHDL 23.2 04/08/2022    CHOLHDL 22.3 10/06/2020      Last Carotid US: 10/2019  Last Echo: 4/2022  Last stress test: 11/08/2018  Cardiovascular angiogram: Can't remember   ECG: SB, 52, otherwise normal  Fundoscopic exam: yearly, No retinopathy noted at present    In 11/2018:  Follow-up  Associated symptoms include chest pain, congestion, coughing, joint swelling, neck pain, numbness and vertigo. Pertinent negatives include no diaphoresis, fever, headaches, myalgias, nausea or weakness.   Hyperlipidemia  Associated symptoms include chest pain. Pertinent negatives include no focal weakness, myalgias or shortness of breath.   Hypertension  Associated symptoms include chest pain, malaise/fatigue and neck pain. Pertinent negatives include no headaches, orthopnea, palpitations, PND or shortness of breath.   Coronary Artery Disease  Symptoms include chest pain and dizziness. Pertinent negatives include no muscle weakness, palpitations, shortness of breath or weight gain. Risk factors include hyperlipidemia.     Dr. Celeste's note - "89 y.o. female who presents for Coronary Artery Disease (Labs); Hypertension; and Hyperlipidemia   DISCUSSED LABS AND GOALS, , NOT TAKING MEDS, , CR 1.06, NO CP OR OCC / SOB, NOT ACTIVE.   Recall CAD with single stent " placed maybe 15+ year ago. Do not recall any cardiac testing. ECG in sinus bradycardia with right axis deviation. Had smoked socially during college. 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 No recent repeat lipid panel. Report compliance with medications with restart of Pravastatin since visit in 3/2018. Not active now except for card playing, stopped walking in the poor for the last 3 weeks due to the water temperature. Was doing about an hour a day. Recall being on Ranexa for a number of years, only take one a day, do not see much benefit. Lot of worries about the not able to walk due to OA with CLBP with left sciatica, also concern of poor balance and falling. Willing to consider going to the gym.    In 9/2019, return for routine follow up. Still some concern about atypical CP for the past 12 years with some chest wall tenderness with hand pressure, also some recurrent dizziness also over the past 25 years. Worry about Carotid stenosis, post left side CEA.    DSE 11/2018 Left ventricle shows mild concentric hypertrophy.  Normal central venous pressure (3 mm Hg).  The estimated PA systolic pressure is 13.11 mm Hg  Left ventricle ejection fraction is increased (hyperdynamic) at 73%  Normal LV diastolic function.  RV systolic function is normal.  There were no arrhythmias during stress.  The EKG portion of this study is negative for myocardial ischemia.  The patient reported no symptoms during the stress test.  No Echo evidence for ischemia    In 3/2020, here for a rushed appointment have appointment [facility]. No change in cardiac status, more active with walking in the pool    In 10/2020, here alone for 6-months review. Difficult problem with clearly cognitive impairment. Some SOB but rare.    In 4/2021, returned by her self for follow up. Significant cognitive impairment as before. Been off oxybutyrin for over 2 months due to concern of cognitive problem. No heart worries, continue with  atypical precordial CP.     In 10/2021, return for 6-months review, still alone. No heart problem. Still did no started Zetia. Able to play bridge regularly and more active in the swimming pool. Would like to see doctor for OA.     In 4/2022, return for review, still lots of pains, now under the care of Madi Call MD. No heart issues.    In 11/2022, return with concern of possible TIA. In the car and could not get the word out. Lasted about 5 minutes, daughter, RN checked VS and felt due to dry mouth but friend is concern for TIA.     Echo 4/2022 - The left ventricle is normal in size with concentric remodeling and normal systolic function.  The estimated ejection fraction is 64%.  Indeterminate left ventricular diastolic function.  The left ventricular global longitudinal strain is -14%. Reduced.  Normal right ventricular size with normal right ventricular systolic function.  Normal central venous pressure (3 mmHg).  The estimated PA systolic pressure is 12 mmHg.  Small pericardial effusion.    Carotid US 10/2019 - No ultrasound evidence to suggest a hemodynamically significant carotid bifurcation stenosis.    Antegrade flow within the vertebral arteries.     In 10/2023, returned for annual review. Did not proceed with testing due to cost of >$400. Still report CP and chronic dizziness with chronic weakness but remain very sedentary.     HPI comments: in 10/2024, return for annual review alone. Daughter willing to get help at home. Difficult to find. Will give info on [facility].    Review of Systems   Constitutional: Positive for malaise/fatigue. Negative for diaphoresis, fever, night sweats and weight gain.        Refused weighing   HENT:  Positive for congestion and hearing loss. Negative for nosebleeds and tinnitus.    Eyes:  Positive for discharge and redness. Negative for visual disturbance.   Cardiovascular:  Positive for chest pain. Negative for claudication, cyanosis, irregular heartbeat,  "near-syncope, orthopnea, palpitations and paroxysmal nocturnal dyspnea. Dyspnea on exertion: do not do much.  Respiratory:  Positive for cough, sleep disturbances due to breathing and sputum production. Negative for shortness of breath, snoring and wheezing.         [place] score 11 today a 14 today, awaken frequently for nocturia x 3. Refuse sleep study. Takes up to 2 naps a day.   Endocrine: Positive for cold intolerance and polyuria. Negative for polydipsia.   Hematologic/Lymphatic: Does not bruise/bleed easily.   Skin:  Positive for itching. Negative for color change, flushing, nail changes, poor wound healing and suspicious lesions.   Musculoskeletal:  Positive for arthritis, back pain, gout, joint swelling, muscle cramps, neck pain and stiffness. Negative for falls, joint pain, muscle weakness and myalgias.   Gastrointestinal:  Positive for constipation. Negative for heartburn, hematemesis, hematochezia, melena and nausea.   Genitourinary:  Positive for bladder incontinence, nocturia and urgency.   Neurological:  Positive for disturbances in coordination, excessive daytime sleepiness, dizziness, light-headedness, loss of balance, numbness, paresthesias and vertigo. Negative for focal weakness, headaches and weakness.   Psychiatric/Behavioral:  Positive for memory loss. Negative for depression and substance abuse. The patient does not have insomnia and is not nervous/anxious.         Objective:    Physical Exam  Constitutional:       Appearance: She is well-developed.      Comments: RA O2 96%  Orthostatic VS: sitting 175/65, standing 140/62   HENT:      Head: Normocephalic.   Eyes:      Conjunctiva/sclera: Conjunctivae normal.      Pupils: Pupils are equal, round, and reactive to light.   Neck:      Thyroid: No thyromegaly.      Vascular: No JVD.      Comments: Circumference 14"  Cardiovascular:      Rate and Rhythm: Bradycardia present. Rhythm irregular.      Pulses: Intact distal pulses.           Carotid " "pulses are 2+ on the right side with bruit and 2+ on the left side with bruit.       Radial pulses are 2+ on the right side and 2+ on the left side.        Dorsalis pedis pulses are 1+ on the right side and 1+ on the left side.        Posterior tibial pulses are 1+ on the right side and 1+ on the left side.      Heart sounds: Heart sounds are distant. Murmur heard.      Harsh midsystolic murmur is present with a grade of 2/6 at the upper right sternal border radiating to the neck.      No friction rub. No gallop.      Comments: Bilateral superficial varicose vein.  Pulmonary:      Effort: Pulmonary effort is normal.      Breath sounds: Normal breath sounds. No rales.   Chest:      Chest wall: No tenderness.   Abdominal:      General: Bowel sounds are normal.      Palpations: Abdomen is soft.      Tenderness: There is no abdominal tenderness.      Comments: Waist 46" down to 43.5", hip 45"   Musculoskeletal:         General: Normal range of motion.      Cervical back: Normal range of motion and neck supple.   Lymphadenopathy:      Cervical: No cervical adenopathy.   Skin:     General: Skin is warm and dry.      Findings: No rash.      Comments: Sensitive to light touch of both LE.   Neurological:      Mental Status: She is alert and oriented to person, place, and time.      Comments: Loss of feelings in both hands.           Assessment:       1. Coronary artery disease of native artery of native heart with stable angina pectoris    2. HTN (hypertension), benign    3. [place] (hard of hearing)    4. Dizziness, onset 1985    5. Cognitive dysfunction    6. Frail elderly         Plan:       Yisel was seen today for annual exam.    Diagnoses and all orders for this visit:    Coronary artery disease of native artery of native heart with stable angina pectoris  -     IN OFFICE EKG 12-LEAD (to Muse)    HTN (hypertension), benign    [place] (hard of hearing)    Dizziness, onset 1985    Cognitive dysfunction    Frail " elderly      - All medical issues reviewed, decline any changes.   - [facility] in [place], [place] - [phone]  - Agree with investigating Assisted Living in [place].  - Warning signs of MI and stroke given, would chew 2-4 low-dose ASA (81 mg) if symptom last more than 5 minutes and call 911.  - Need to remain well hydrated but avoid drinking within 4 hours of bedtime. Recommend at least 90 oz of fluid daily per IOM (institute of Medicine) suggestion.   - CV status and all medications reviewed, patient acknowledge good understanding.  - Instruction for Mediterranean diet and heart healthy exercise given.  - Recommend healthy living: moderate alcohol, healthy diet and regular exercise aiming for fitness, restorative sleep and weight control  - Discussed healthy alcohol daily limit of 0.5 oz of pure alcohol in any 24 hours (roughly one 12-oz beers, 5 oz of wine (8%-12% alcohol), or 0.75 oz (half a shot) of liquor (80 proof)), can not save up.   - Encourage activities as much as tolerated. Any activity is better than none!  - Check home blood pressure, 2 days weekly, do 2 readings within 5 minutes in AM and PM, keep log for review.  - Weigh twice weekly, try to lose 1-2 lbs per week  - Highly recommend 30-60 minutes of exercise daily, can have Sunday off, with 2-3 sessions of muscle strengthening weekly. A  would be very helpful.  - Highly recommend Yoga, Macho-Chi and or meditation  - Recommend at least biannual cardiovascular evaluation in view of her significant risk factors. Patient's preference  - Should have family member attend every doctor visit if at all possible. Bring all active medication in a bag for review.        Patient Active Problem List   Diagnosis    Coronary artery disease of native artery of native heart with stable angina pectoris    Elevated fasting glucose    Hypercalcemia    Mixed hyperlipidemia, baseline LDL not available    HTN (hypertension), benign     Rhinitis    Tear of retina without detachment - Right Eye    EPIPHORA    Dry eyes    Pseudophakia - Both Eyes    Vitamin D deficiency    Anemia    Difficulty walking    Lower extremity pain    Back pain    Nephrolithiasis    Hypertensive heart disease without heart failure    Anxiety    Stress    Hyponatremia    Class 1 obesity due to excess calories with serious comorbidity and body mass index (BMI) of 32.0 to 32.9 in adult    Memory difficulties    Poor balance, onset 2014    Status post coronary artery stent placement    BMI 33.0-33.9,adult    Abdominal obesity    Sedentary lifestyle    Chronic bilateral low back pain with left-sided sciatica    Excessive daytime sleepiness, refuse sleep study    Hyperlipidemia    Stage 3a chronic kidney disease    Osteoarthritis of multiple joints    Mixed conductive and sensorineural hearing loss of both ears    Atypical chest pain, onset 2007    History of left-sided carotid endarterectomy    Dizziness, onset 1985    Bruit of right carotid artery    Gouty tophi    Statin intolerance    Generalized body aches, onset 2010    Cognitive dysfunction    Personal history of noncompliance with medical treatment, presenting hazards to health    Prerenal azotemia    Bradycardia    Chronic pain syndrome    Neuropathy    Burning sensation of lower extremity    Frail elderly    Idiopathic peripheral neuropathy    Aortic heart murmur    Impaired functional mobility, balance, gait, and endurance    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 Greater than 50% of the time was spent in counseling and coordination of care. The above assessment and plan have been discussed at length. Referring provider's note reviewed. Labs and procedure over the last 6 months reviewed. Problem List updated. Asked to bring in all active medications / pills bottles with  next visit. Will send note to referring / PCP.

## 2024-10-09 LAB
OHS QRS DURATION: 72 MS
OHS QTC CALCULATION: 385 MS

## 2024-12-03 ENCOUNTER — LAB VISIT (OUTPATIENT)
Dept: LAB | Facility: HOSPITAL | Age: 89
End: 2024-12-03
Attending: FAMILY MEDICINE
Payer: MEDICARE

## 2024-12-03 DIAGNOSIS — E55.9 VITAMIN D DEFICIENCY: ICD-10-CM

## 2024-12-03 DIAGNOSIS — E83.52 HYPERCALCEMIA: ICD-10-CM

## 2024-12-03 DIAGNOSIS — I10 HTN (HYPERTENSION), BENIGN: ICD-10-CM

## 2024-12-03 LAB
25(OH)D3+25(OH)D2 SERPL-MCNC: 27 NG/ML (ref 30–96)
ALBUMIN SERPL BCP-MCNC: 3.8 G/DL (ref 3.5–5.2)
ALP SERPL-CCNC: 57 U/L (ref 40–150)
ALT SERPL W/O P-5'-P-CCNC: 10 U/L (ref 10–44)
ANION GAP SERPL CALC-SCNC: 7 MMOL/L (ref 8–16)
AST SERPL-CCNC: 15 U/L (ref 10–40)
BILIRUB SERPL-MCNC: 0.5 MG/DL (ref 0.1–1)
BUN SERPL-MCNC: 24 MG/DL (ref 10–30)
CA-I BLDV-SCNC: 1.43 MMOL/L (ref 1.06–1.42)
CALCIUM SERPL-MCNC: 10.7 MG/DL (ref 8.7–10.5)
CHLORIDE SERPL-SCNC: 109 MMOL/L (ref 95–110)
CO2 SERPL-SCNC: 24 MMOL/L (ref 23–29)
CREAT SERPL-MCNC: 1.1 MG/DL (ref 0.5–1.4)
EST. GFR  (NO RACE VARIABLE): 47.4 ML/MIN/1.73 M^2
GLUCOSE SERPL-MCNC: 107 MG/DL (ref 70–110)
POTASSIUM SERPL-SCNC: 4.3 MMOL/L (ref 3.5–5.1)
PROT SERPL-MCNC: 6.6 G/DL (ref 6–8.4)
PTH-INTACT SERPL-MCNC: 179.2 PG/ML (ref 9–77)
SODIUM SERPL-SCNC: 140 MMOL/L (ref 136–145)

## 2024-12-03 PROCEDURE — 80053 COMPREHEN METABOLIC PANEL: CPT | Performed by: FAMILY MEDICINE

## 2024-12-03 PROCEDURE — 83970 ASSAY OF PARATHORMONE: CPT | Performed by: FAMILY MEDICINE

## 2024-12-03 PROCEDURE — 82330 ASSAY OF CALCIUM: CPT | Performed by: FAMILY MEDICINE

## 2024-12-03 PROCEDURE — 36415 COLL VENOUS BLD VENIPUNCTURE: CPT | Performed by: FAMILY MEDICINE

## 2024-12-03 PROCEDURE — 82306 VITAMIN D 25 HYDROXY: CPT | Performed by: FAMILY MEDICINE

## 2025-06-17 ENCOUNTER — OFFICE VISIT (OUTPATIENT)
Dept: FAMILY MEDICINE | Facility: CLINIC | Age: OVER 89
End: 2025-06-17
Payer: COMMERCIAL

## 2025-06-17 VITALS
DIASTOLIC BLOOD PRESSURE: 72 MMHG | RESPIRATION RATE: 16 BRPM | BODY MASS INDEX: 30.04 KG/M2 | HEART RATE: 57 BPM | SYSTOLIC BLOOD PRESSURE: 118 MMHG | OXYGEN SATURATION: 97 % | HEIGHT: 64 IN

## 2025-06-17 DIAGNOSIS — E55.9 VITAMIN D DEFICIENCY: ICD-10-CM

## 2025-06-17 DIAGNOSIS — I35.8 AORTIC HEART MURMUR: ICD-10-CM

## 2025-06-17 DIAGNOSIS — N39.3 STRESS INCONTINENCE OF URINE: ICD-10-CM

## 2025-06-17 DIAGNOSIS — N18.31 STAGE 3A CHRONIC KIDNEY DISEASE: ICD-10-CM

## 2025-06-17 DIAGNOSIS — Z12.83 SCREENING FOR SKIN CANCER: ICD-10-CM

## 2025-06-17 DIAGNOSIS — R54 FRAIL ELDERLY: ICD-10-CM

## 2025-06-17 DIAGNOSIS — F09 COGNITIVE DYSFUNCTION: Primary | ICD-10-CM

## 2025-06-17 DIAGNOSIS — I10 HTN (HYPERTENSION), BENIGN: ICD-10-CM

## 2025-06-17 DIAGNOSIS — R26.89 POOR BALANCE: ICD-10-CM

## 2025-06-17 DIAGNOSIS — I25.118 CORONARY ARTERY DISEASE OF NATIVE ARTERY OF NATIVE HEART WITH STABLE ANGINA PECTORIS: ICD-10-CM

## 2025-06-17 PROCEDURE — 99214 OFFICE O/P EST MOD 30 MIN: CPT | Mod: S$GLB,,, | Performed by: STUDENT IN AN ORGANIZED HEALTH CARE EDUCATION/TRAINING PROGRAM

## 2025-06-17 PROCEDURE — 99999 PR PBB SHADOW E&M-EST. PATIENT-LVL IV: CPT | Mod: PBBFAC,,, | Performed by: STUDENT IN AN ORGANIZED HEALTH CARE EDUCATION/TRAINING PROGRAM

## 2025-06-17 RX ORDER — ERGOCALCIFEROL 1.25 MG/1
50000 CAPSULE ORAL
Qty: 12 CAPSULE | Refills: 1 | Status: SHIPPED | OUTPATIENT
Start: 2025-06-17

## 2025-06-17 NOTE — PROGRESS NOTES
Subjective:       Patient ID: Yisel العلي is a 89 y.o. female.    Chief Complaint: Follow-up    History of Present Illness    CHIEF COMPLAINT:  Ms. العلي presents today for fatigue.    FATIGUE AND SLEEP:  She reports persistent fatigue requiring up to two naps daily despite waking between 8:00-9:30 AM. She wakes up every two hours at night for bathroom visits but falls back asleep immediately after returning to bed.    URINARY FREQUENCY:  She reports urinary frequency every 2 hours during the day. She drinks two cups of coffee in the morning, occasionally reducing to one cup when going out to minimize urinary frequency.    MOBILITY AND FALL HISTORY:  She uses walker consistently for ambulation in all settings including home, garage, and when traveling. She has history of one fall resulting in head injury that required staples.    DIZZINESS:  She receives physical therapy from [facility] for management of intermittent dizziness with reported effectiveness.    CARDIOLOGY:  She recently attended cardiology appointment at [facility].       Review of Systems   Constitutional:  Positive for fatigue. Negative for activity change and appetite change.   Respiratory:  Negative for shortness of breath.    Cardiovascular:  Negative for chest pain.   Gastrointestinal:  Negative for abdominal pain.   Genitourinary:  Negative for dysuria.   Musculoskeletal:  Positive for arthralgias and gait problem.   Integumentary:  Negative for rash.   Neurological:  Positive for weakness.   Psychiatric/Behavioral:  Negative for sleep disturbance.       Problem List[1]  Yisel has a current medication list which includes the following prescription(s): colchicine, lisinopril, allopurinol, and ergocalciferol.        Health Maintenance Due   Topic Date Due    Aspirin/Antiplatelet Therapy  Never done    Shingles Vaccine (1 of 2) Never done    COVID-19 Vaccine (5 - 2024-25 season) 09/01/2024    Hemoglobin A1c (Prediabetes)  05/03/2025         Objective:      Physical Exam  Constitutional:       General: She is not in acute distress.     Appearance: Normal appearance. She is not ill-appearing.   Eyes:      Conjunctiva/sclera: Conjunctivae normal.   Cardiovascular:      Rate and Rhythm: Normal rate and regular rhythm.      Heart sounds: Normal heart sounds. No murmur heard.  Pulmonary:      Effort: Pulmonary effort is normal. No respiratory distress.      Breath sounds: Normal breath sounds. No wheezing or rales.   Musculoskeletal:      Right lower leg: No edema.      Left lower leg: No edema.   Lymphadenopathy:      Cervical: No cervical adenopathy.   Skin:     General: Skin is warm and dry.   Neurological:      Mental Status: She is alert. Mental status is at baseline.      Gait: Gait abnormal (walking with a walker).   Psychiatric:         Mood and Affect: Mood normal.         Behavior: Behavior normal.         Thought Content: Thought content normal.         Judgment: Judgment normal.             Assessment:       Assessment & Plan    1. Assessed reported fatigue and frequent napping, considering potential impact on daily functioning.  2. Evaluated heart murmur, noting it as common and age-related condition caused by blood flow through slightly leaky heart valves.  3. Considered frequent urination, both during day and night, as potential contributor to fatigue.           Plan:       1. Cognitive dysfunction  Assessment & Plan:  Stable. Easily confused by medical procedures and when she got her labwork      2. Coronary artery disease of native artery of native heart with stable angina pectoris  Assessment & Plan:  No chest pain.  She would like assistance in getting set up with cardiology again      3. HTN (hypertension), benign  Assessment & Plan:  Repeat and follow  BP Readings from Last 3 Encounters:   06/17/25 (!) 146/72   10/08/24 (!) 140/62   09/04/24 128/70     Hypertension Medications              lisinopriL (PRINIVIL,ZESTRIL) 40 MG tablet  Take 1 tablet (40 mg total) by mouth once daily.                4. Aortic heart murmur  Assessment & Plan:  Stable and unchanged      5. Poor balance, onset 2014  Assessment & Plan:  She notes worsening and would like additional therapy    Orders:  -     Ambulatory Referral/Consult to Physical Therapy; Future; Expected date: 06/24/2025    6. Frail elderly  -     Ambulatory Referral/Consult to Physical Therapy; Future; Expected date: 06/24/2025    7. Stage 3a chronic kidney disease  Assessment & Plan:  BMP  Lab Results   Component Value Date     12/03/2024    K 4.3 12/03/2024     12/03/2024    CO2 24 12/03/2024    BUN 24 12/03/2024    CREATININE 1.1 12/03/2024    CALCIUM 10.7 (H) 12/03/2024    ANIONGAP 7 (L) 12/03/2024    EGFRNORACEVR 47.4 (A) 12/03/2024     On aci.        8. Vitamin D deficiency  -     Vitamin D; Future; Expected date: 06/17/2025  -     ergocalciferol (ERGOCALCIFEROL) 50,000 unit Cap; Take 1 capsule (50,000 Units total) by mouth every 7 days.  Dispense: 12 capsule; Refill: 1    9. Screening for skin cancer  -     Ambulatory referral/consult to Dermatology; Future; Expected date: 06/24/2025    10. Stress incontinence of urine  Assessment & Plan:  Wears diapers but reports she wakes every two hours to urinate  She is not drinking much caffiene.  She is drinking water.  No dysuria.            This note was generated with the assistance of ambient listening technology. Verbal consent was obtained by the patient and accompanying visitor(s) for the recording of patient appointment to facilitate this note. I attest to having reviewed and edited the generated note for accuracy, though some syntax or spelling errors may persist. Please contact the author of this note for any clarification.                    [1]   Patient Active Problem List  Diagnosis    Coronary artery disease of native artery of native heart with stable angina pectoris    Elevated fasting glucose    Hypercalcemia    Mixed  hyperlipidemia, baseline LDL not available    HTN (hypertension), benign    Rhinitis    Tear of retina without detachment - Right Eye    EPIPHORA    Dry eyes    Pseudophakia - Both Eyes    Vitamin D deficiency    Anemia    Difficulty walking    Lower extremity pain    Back pain    Nephrolithiasis    Hypertensive heart disease without heart failure    Anxiety    Stress    Hyponatremia    Class 1 obesity due to excess calories with serious comorbidity and body mass index (BMI) of 32.0 to 32.9 in adult    Memory difficulties    Poor balance, onset 2014    Status post coronary artery stent placement    BMI 33.0-33.9,adult    Abdominal obesity    Sedentary lifestyle    Chronic bilateral low back pain with left-sided sciatica    Excessive daytime sleepiness, refuse sleep study    Hyperlipidemia    Stage 3a chronic kidney disease    Osteoarthritis of multiple joints    Mixed conductive and sensorineural hearing loss of both ears    Atypical chest pain, onset 2007    History of left-sided carotid endarterectomy    Dizziness, onset 1985    Bruit of right carotid artery    Gouty tophi    Statin intolerance    Generalized body aches, onset 2010    Cognitive dysfunction    Personal history of noncompliance with medical treatment, presenting hazards to health    Prerenal azotemia    Bradycardia    Chronic pain syndrome    Neuropathy    Burning sensation of lower extremity    Frail elderly    Idiopathic peripheral neuropathy    Aortic heart murmur    Impaired functional mobility, balance, gait, and endurance    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

## 2025-06-17 NOTE — ASSESSMENT & PLAN NOTE
Wears diapers but reports she wakes every two hours to urinate  She is not drinking much caffiene.  She is drinking water.  No dysuria.

## 2025-06-17 NOTE — ASSESSMENT & PLAN NOTE
BMP  Lab Results   Component Value Date     12/03/2024    K 4.3 12/03/2024     12/03/2024    CO2 24 12/03/2024    BUN 24 12/03/2024    CREATININE 1.1 12/03/2024    CALCIUM 10.7 (H) 12/03/2024    ANIONGAP 7 (L) 12/03/2024    EGFRNORACEVR 47.4 (A) 12/03/2024     On aci.

## 2025-06-17 NOTE — ASSESSMENT & PLAN NOTE
Repeat and follow  BP Readings from Last 3 Encounters:   06/17/25 (!) 146/72   10/08/24 (!) 140/62   09/04/24 128/70     Hypertension Medications              lisinopriL (PRINIVIL,ZESTRIL) 40 MG tablet Take 1 tablet (40 mg total) by mouth once daily.

## 2025-07-01 ENCOUNTER — OFFICE VISIT (OUTPATIENT)
Dept: CARDIOLOGY | Facility: CLINIC | Age: OVER 89
End: 2025-07-01
Payer: COMMERCIAL

## 2025-07-01 VITALS
DIASTOLIC BLOOD PRESSURE: 70 MMHG | HEART RATE: 50 BPM | OXYGEN SATURATION: 96 % | SYSTOLIC BLOOD PRESSURE: 136 MMHG | BODY MASS INDEX: 29.88 KG/M2 | WEIGHT: 175 LBS | HEIGHT: 64 IN

## 2025-07-01 DIAGNOSIS — Z78.9 STATIN INTOLERANCE: ICD-10-CM

## 2025-07-01 DIAGNOSIS — Z74.09 IMPAIRED FUNCTIONAL MOBILITY, BALANCE, GAIT, AND ENDURANCE: ICD-10-CM

## 2025-07-01 DIAGNOSIS — R00.1 BRADYCARDIA: ICD-10-CM

## 2025-07-01 DIAGNOSIS — R07.89 ATYPICAL CHEST PAIN: ICD-10-CM

## 2025-07-01 DIAGNOSIS — I25.118 CORONARY ARTERY DISEASE OF NATIVE ARTERY OF NATIVE HEART WITH STABLE ANGINA PECTORIS: ICD-10-CM

## 2025-07-01 DIAGNOSIS — F09 COGNITIVE DYSFUNCTION: Primary | ICD-10-CM

## 2025-07-01 DIAGNOSIS — G47.19 EXCESSIVE DAYTIME SLEEPINESS: ICD-10-CM

## 2025-07-01 DIAGNOSIS — E65 ABDOMINAL OBESITY: ICD-10-CM

## 2025-07-01 DIAGNOSIS — Z91.89 SEDENTARY LIFESTYLE: ICD-10-CM

## 2025-07-01 DIAGNOSIS — Z95.5 STATUS POST CORONARY ARTERY STENT PLACEMENT: ICD-10-CM

## 2025-07-01 DIAGNOSIS — R54 FRAIL ELDERLY: ICD-10-CM

## 2025-07-01 DIAGNOSIS — R42 DIZZINESS: ICD-10-CM

## 2025-07-01 DIAGNOSIS — H91.90 HOH (HARD OF HEARING): ICD-10-CM

## 2025-07-01 DIAGNOSIS — E78.2 MIXED HYPERLIPIDEMIA: ICD-10-CM

## 2025-07-01 DIAGNOSIS — N18.31 STAGE 3A CHRONIC KIDNEY DISEASE: ICD-10-CM

## 2025-07-01 DIAGNOSIS — I11.9 HYPERTENSIVE HEART DISEASE WITHOUT HEART FAILURE: ICD-10-CM

## 2025-07-01 PROBLEM — F43.9 STRESS: Status: RESOLVED | Noted: 2017-10-25 | Resolved: 2025-07-01

## 2025-07-01 PROCEDURE — 99999 PR PBB SHADOW E&M-EST. PATIENT-LVL III: CPT | Mod: PBBFAC,,, | Performed by: INTERNAL MEDICINE

## 2025-07-01 PROCEDURE — 99214 OFFICE O/P EST MOD 30 MIN: CPT | Mod: S$GLB,,, | Performed by: INTERNAL MEDICINE

## 2025-07-01 RX ORDER — ASPIRIN 325 MG
1250 TABLET, DELAYED RELEASE (ENTERIC COATED) ORAL
COMMUNITY
Start: 2025-04-16

## 2025-07-01 RX ORDER — MUPIROCIN 20 MG/G
OINTMENT TOPICAL 3 TIMES DAILY
COMMUNITY
Start: 2025-01-31

## 2025-07-01 NOTE — PROGRESS NOTES
Subjective:    Patient ID:  Yisel العلي is a 89 y.o. female who presents for evaluation of Follow-up (6 month) and Dizziness    for atypical CP and chronic dizziness, refuse statin due to muscle pain, semi-annual review.  Came on own  PCP: Rosie Harrison MD   Gyn: Nikolai Simeon MD in [facility] cardiologist: Dr. Celeste, last seen in 3/2018  Pain: Madi Call MD  Physical M&R: Alix Rivera, DO  Lives alone, no pet, worry about tripping with poor balance for 5 years. Have moved to a big house in [facility], able to walk inside with walker   Daughter, Vandana, RN in [facility], do not have POA   Son, David, in [place],  do have POA, not medical  Retire teacher, [employer], play competitive bridge once a week    SDOH: Difficult historian with memory difficulties. [place], Here alone, daughter have to work, still drives only locally  Health literacy: Medium  Vaccinations: Completed COVID, had infection, mild    Activities: ADL's only, no regular exercise, limited by chronic dizziness but no falling, used to swim daily until COVID started.  Nicotine: Never  Alcohol: 1-2 beer /month, max 1 per day, now once or twice a month.  Illicit drugs: Denies, on Rx Valium but use only once in 6 months.  Cardiac concerns: still with atypical CP  Home BP: Yes - it's OK, don't go over 140  Medication compliance: No, on only lisinopril nightly  Diet: regular  Caffeine: 2 cups /day  Labs: 11/08/2018 & 04/12/2019: No Troponin and TSH; .0 not on Rx, own preference; A1C 5.6%; Sodium 135L; K+ 4.5; GFR 58L; Glucose 99; WBC 6.9; Hemoglobin 13.2; High Sensitivity 0.30  No TSH, Troponin, BNP:  .2H not on Rx;  A1C 5.6;  Sodium 135L;  K+ 4.4;  Glucose 136H;  GFR 58.5l;  WBC 5.34;  Hemoglobin 13.6;    Lab Results   Component Value Date    TSH 1.400 05/03/2024        Lab Results   Component Value Date    LABA1C 6.1 (H) 01/23/2015    HGBA1C 5.4 05/03/2024       Lab Results   Component  Value Date    WBC 6.92 03/21/2024    HGB 13.9 03/21/2024    HCT 40.3 03/21/2024    MCV 91 03/21/2024     03/21/2024       CMP  Sodium   Date Value Ref Range Status   12/03/2024 140 136 - 145 mmol/L Final     Potassium   Date Value Ref Range Status   12/03/2024 4.3 3.5 - 5.1 mmol/L Final     Chloride   Date Value Ref Range Status   12/03/2024 109 95 - 110 mmol/L Final     CO2   Date Value Ref Range Status   12/03/2024 24 23 - 29 mmol/L Final     Glucose   Date Value Ref Range Status   12/03/2024 107 70 - 110 mg/dL Final     BUN   Date Value Ref Range Status   12/03/2024 24 10 - 30 mg/dL Final     Creatinine   Date Value Ref Range Status   12/03/2024 1.1 0.5 - 1.4 mg/dL Final     Calcium   Date Value Ref Range Status   12/03/2024 10.7 (H) 8.7 - 10.5 mg/dL Final     Total Protein   Date Value Ref Range Status   12/03/2024 6.6 6.0 - 8.4 g/dL Final     Albumin   Date Value Ref Range Status   12/03/2024 3.8 3.5 - 5.2 g/dL Final     Total Bilirubin   Date Value Ref Range Status   12/03/2024 0.5 0.1 - 1.0 mg/dL Final     Comment:     For infants and newborns, interpretation of results should be based  on gestational age, weight and in agreement with clinical  observations.    Premature Infant recommended reference ranges:  Up to 24 hours.............<8.0 mg/dL  Up to 48 hours............<12.0 mg/dL  3-5 days..................<15.0 mg/dL  6-29 days.................<15.0 mg/dL       Alkaline Phosphatase   Date Value Ref Range Status   12/03/2024 57 40 - 150 U/L Final     AST   Date Value Ref Range Status   12/03/2024 15 10 - 40 U/L Final     ALT   Date Value Ref Range Status   12/03/2024 10 10 - 44 U/L Final     Anion Gap   Date Value Ref Range Status   12/03/2024 7 (L) 8 - 16 mmol/L Final     eGFR if    Date Value Ref Range Status   04/08/2022 >60.0 >60 mL/min/1.73 m^2 Final     eGFR if non    Date Value Ref Range Status   04/08/2022 >60.0 >60 mL/min/1.73 m^2 Final     Comment:      "Calculation used to obtain the estimated glomerular filtration  rate (eGFR) is the CKD-EPI equation.        @labrcntip(troponini)@  No results found for: "BNP"}   Lab Results   Component Value Date    CHOL 197 02/01/2023    CHOL 228 (H) 04/08/2022    CHOL 233 (H) 10/06/2020     Lab Results   Component Value Date    HDL 54 02/01/2023    HDL 53 04/08/2022    HDL 52 10/06/2020     Lab Results   Component Value Date    LDLCALC 119.4 02/01/2023    LDLCALC 132.4 04/08/2022    LDLCALC 148.0 10/06/2020     Lab Results   Component Value Date    TRIG 118 02/01/2023    TRIG 213 (H) 04/08/2022    TRIG 165 (H) 10/06/2020     Lab Results   Component Value Date    CHOLHDL 27.4 02/01/2023    CHOLHDL 23.2 04/08/2022    CHOLHDL 22.3 10/06/2020      Last Carotid US: 10/2019  Last Echo: 4/2022  Last stress test: 11/08/2018  Cardiovascular angiogram: Can't remember   ECG: 10/2024 SB, 52, otherwise normal  Fundoscopic exam: yearly, No retinopathy noted at present    In 11/2018:  Follow-up  Associated symptoms include chest pain, congestion, coughing, joint swelling, neck pain, numbness and vertigo. Pertinent negatives include no diaphoresis, fever, headaches, myalgias, nausea, vomiting or weakness.   Hyperlipidemia  Associated symptoms include chest pain. Pertinent negatives include no focal weakness, myalgias or shortness of breath.   Hypertension  Associated symptoms include chest pain, malaise/fatigue and neck pain. Pertinent negatives include no headaches, orthopnea, palpitations, PND or shortness of breath.   Coronary Artery Disease  Symptoms include chest pain and dizziness. Pertinent negatives include no leg swelling, muscle weakness, palpitations, shortness of breath or weight gain. Risk factors include hyperlipidemia.     Dr. Celeste's note - "89 y.o. female who presents for Coronary Artery Disease (Labs); Hypertension; and Hyperlipidemia   DISCUSSED LABS AND GOALS, , NOT TAKING MEDS, , CR 1.06, NO CP OR OCC / SOB, " NOT ACTIVE.   Recall CAD with single stent placed maybe 15+ year ago. Do not recall any cardiac testing. ECG in sinus bradycardia with right axis deviation. Had smoked socially during college. 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 No recent repeat lipid panel. Report compliance with medications with restart of Pravastatin since visit in 3/2018. Not active now except for card playing, stopped walking in the poor for the last 3 weeks due to the water temperature. Was doing about an hour a day. Recall being on Ranexa for a number of years, only take one a day, do not see much benefit. Lot of worries about the not able to walk due to OA with CLBP with left sciatica, also concern of poor balance and falling. Willing to consider going to the gym.    In 9/2019, return for routine follow up. Still some concern about atypical CP for the past 12 years with some chest wall tenderness with hand pressure, also some recurrent dizziness also over the past 25 years. Worry about Carotid stenosis, post left side CEA.    DSE 11/2018 Left ventricle shows mild concentric hypertrophy.  Normal central venous pressure (3 mm Hg).  The estimated PA systolic pressure is 13.11 mm Hg  Left ventricle ejection fraction is increased (hyperdynamic) at 73%  Normal LV diastolic function.  RV systolic function is normal.  There were no arrhythmias during stress.  The EKG portion of this study is negative for myocardial ischemia.  The patient reported no symptoms during the stress test.  No Echo evidence for ischemia    In 3/2020, here for a rushed appointment have appointment [facility]. No change in cardiac status, more active with walking in the pool    In 10/2020, here alone for 6-months review. Difficult problem with clearly cognitive impairment. Some SOB but rare.    In 4/2021, returned by her self for follow up. Significant cognitive impairment as before. Been off oxybutyrin for over 2 months due to concern of cognitive  problem. No heart worries, continue with atypical precordial CP.     In 10/2021, return for 6-months review, still alone. No heart problem. Still did no started Zetia. Able to play bridge regularly and more active in the swimming pool. Would like to see doctor for OA.     In 4/2022, return for review, still lots of pains, now under the care of Madi Call MD. No heart issues.    In 11/2022, return with concern of possible TIA. In the car and could not get the word out. Lasted about 5 minutes, daughter, RN checked VS and felt due to dry mouth but friend is concern for TIA.     Echo 4/2022 - The left ventricle is normal in size with concentric remodeling and normal systolic function.  The estimated ejection fraction is 64%.  Indeterminate left ventricular diastolic function.  The left ventricular global longitudinal strain is -14%. Reduced.  Normal right ventricular size with normal right ventricular systolic function.  Normal central venous pressure (3 mmHg).  The estimated PA systolic pressure is 12 mmHg.  Small pericardial effusion.    Carotid US 10/2019 - No ultrasound evidence to suggest a hemodynamically significant carotid bifurcation stenosis.    Antegrade flow within the vertebral arteries.     In 10/2023, returned for annual review. Did not proceed with testing due to cost of >$400. Still report CP and chronic dizziness with chronic weakness but remain very sedentary.     In 10/2024, return for annual review alone. Daughter willing to get help at home. Difficult to find. Will give info on [facility].    HPI comments: in 7/2025, return for review. Progressive dizziness without falling. Using walkers.     Review of Systems   Constitutional: Positive for malaise/fatigue. Negative for diaphoresis, fever, night sweats and weight gain.        Refused weighing   HENT:  Positive for congestion and hearing loss. Negative for nosebleeds and tinnitus.    Eyes:  Positive for discharge and redness.  Negative for visual disturbance.   Cardiovascular:  Positive for chest pain. Negative for claudication, cyanosis, irregular heartbeat, leg swelling, near-syncope, orthopnea, palpitations and paroxysmal nocturnal dyspnea. Dyspnea on exertion: do not do much.  Respiratory:  Positive for cough, sleep disturbances due to breathing and sputum production. Negative for shortness of breath, snoring and wheezing.         [place] score 11 today a 16 today, awaken frequently for nocturia x 3. Refuse sleep study. Takes up to 2 naps a day.   Endocrine: Positive for cold intolerance and polyuria. Negative for polydipsia.   Hematologic/Lymphatic: Does not bruise/bleed easily.   Skin:  Positive for itching. Negative for color change, flushing, nail changes, poor wound healing and suspicious lesions.   Musculoskeletal:  Positive for arthritis, back pain, gout, joint swelling, muscle cramps, neck pain and stiffness. Negative for falls, joint pain, muscle weakness and myalgias.   Gastrointestinal:  Positive for constipation. Negative for diarrhea, heartburn, hematemesis, hematochezia, melena, nausea and vomiting.   Genitourinary:  Positive for bladder incontinence, nocturia and urgency. Negative for dysuria and hematuria.   Neurological:  Positive for disturbances in coordination, excessive daytime sleepiness, dizziness, light-headedness, loss of balance, numbness, paresthesias and vertigo. Negative for focal weakness, headaches and weakness.   Psychiatric/Behavioral:  Positive for memory loss. Negative for depression and substance abuse. The patient does not have insomnia and is not nervous/anxious.      Answers submitted by the patient for this visit:  Review of Systems Questionnaire (Submitted on 7/1/2025)  activity change: No  unexpected weight change: Yes  rhinorrhea: No  trouble swallowing: No  chest tightness: No  blood in stool: No  difficulty urinating: No  menstrual problem: No  arthralgias: Yes  confusion: Yes  dysphoric  "mood: No     Objective:    Physical Exam  Constitutional:       Appearance: She is well-developed.      Comments: RA O2 96%  Orthostatic BP: sitting 1285/65, standing 136/62   HENT:      Head: Normocephalic.   Eyes:      Conjunctiva/sclera: Conjunctivae normal.      Pupils: Pupils are equal, round, and reactive to light.   Neck:      Thyroid: No thyromegaly.      Vascular: No JVD.      Comments: Circumference 14"  Cardiovascular:      Rate and Rhythm: Bradycardia present. Rhythm irregular.      Pulses: Intact distal pulses.           Carotid pulses are 2+ on the right side with bruit and 2+ on the left side with bruit.       Radial pulses are 2+ on the right side and 2+ on the left side.        Dorsalis pedis pulses are 1+ on the right side and 1+ on the left side.        Posterior tibial pulses are 1+ on the right side and 1+ on the left side.      Heart sounds: Heart sounds are distant. Murmur heard.      Harsh midsystolic murmur is present with a grade of 2/6 at the upper right sternal border radiating to the neck.      No friction rub. No gallop.      Comments: Bilateral superficial varicose vein.  Pulmonary:      Effort: Pulmonary effort is normal.      Breath sounds: Normal breath sounds. No rales.   Chest:      Chest wall: No tenderness.   Abdominal:      General: Bowel sounds are normal.      Palpations: Abdomen is soft.      Tenderness: There is no abdominal tenderness.      Comments: Waist 46" down to 43", hip 45"   Musculoskeletal:         General: Normal range of motion.      Cervical back: Normal range of motion and neck supple.   Lymphadenopathy:      Cervical: No cervical adenopathy.   Skin:     General: Skin is warm and dry.      Findings: No rash.      Comments: Sensitive to light touch of both LE.   Neurological:      Mental Status: She is alert and oriented to person, place, and time.      Comments: Loss of feelings in both hands.           Assessment:       1. Cognitive dysfunction    2. " Sedentary lifestyle    3. Frail elderly    4. Excessive daytime sleepiness, refuse sleep study    5. Abdominal obesity    6. Impaired functional mobility, balance, gait, and endurance    7. Atypical chest pain, onset 2007    8. Bradycardia    9. Coronary artery disease of native artery of native heart with stable angina pectoris    10. Dizziness, onset 1985    11. [place] (hard of hearing)    12. Hypertensive heart disease without heart failure    13. Mixed hyperlipidemia, baseline LDL not available    14. Stage 3a chronic kidney disease    15. Statin intolerance    16. Status post coronary artery stent placement           Plan:       Yisel was seen today for follow-up and dizziness.    Diagnoses and all orders for this visit:    Cognitive dysfunction    Sedentary lifestyle    Frail elderly    Excessive daytime sleepiness, refuse sleep study    Abdominal obesity    Impaired functional mobility, balance, gait, and endurance    Atypical chest pain, onset 2007    Bradycardia    Coronary artery disease of native artery of native heart with stable angina pectoris    Dizziness, onset 1985    [place] (hard of hearing)    Hypertensive heart disease without heart failure    Mixed hyperlipidemia, baseline LDL not available    Stage 3a chronic kidney disease    Statin intolerance    Status post coronary artery stent placement        - All medical issues reviewed, decline any changes.   - [facility] in [place], [place] - [phone]  - Warning signs of MI and stroke given, would chew 2-4 low-dose ASA (81 mg) if symptom last more than 5 minutes and call 911.  - Need to remain well hydrated but avoid drinking within 4 hours of bedtime. Recommend at least 90 oz of fluid daily per IOM (institute of Medicine) suggestion.   - CV status and all medications reviewed, patient acknowledge good understanding.  - Instruction for Mediterranean diet and heart healthy exercise given.  - Recommend healthy living: moderate alcohol, healthy  diet and regular exercise aiming for fitness, restorative sleep and weight control  - Discussed healthy alcohol daily limit of 0.5 oz of pure alcohol in any 24 hours (roughly one 12-oz beers, 5 oz of wine (8%-12% alcohol), or 0.75 oz (half a shot) of liquor (80 proof)), can not save up.   - Encourage activities as much as tolerated. Any activity is better than none!  - Check home blood pressure, 2 days weekly, do 2 readings within 5 minutes in AM and PM, keep log for review.  - Weigh twice weekly, try to lose 1-2 lbs per week  - Highly recommend 30-60 minutes of exercise daily, can have Sunday off, with 2-3 sessions of muscle strengthening weekly. A  would be very helpful.  - Highly recommend Yoga, Macho-Chi and or meditation  - Recommend at least biannual cardiovascular evaluation in view of her significant risk factors. Patient's preference  - Should have family member attend every doctor visit if at all possible. Bring all active medication in a bag for review.        Patient Active Problem List   Diagnosis    Coronary artery disease of native artery of native heart with stable angina pectoris    Elevated fasting glucose    Hypercalcemia    Mixed hyperlipidemia, baseline LDL not available    HTN (hypertension), benign    Rhinitis    Tear of retina without detachment - Right Eye    EPIPHORA    Dry eyes    Pseudophakia - Both Eyes    Vitamin D deficiency    Anemia    Difficulty walking    Lower extremity pain    Back pain    Nephrolithiasis    Hypertensive heart disease without heart failure    Anxiety    Hyponatremia    Class 1 obesity due to excess calories with serious comorbidity and body mass index (BMI) of 32.0 to 32.9 in adult    Memory difficulties    Poor balance, onset 2014    Status post coronary artery stent placement    BMI 33.0-33.9,adult    Abdominal obesity    Sedentary lifestyle    Chronic bilateral low back pain with left-sided sciatica    Excessive daytime sleepiness, refuse sleep  study    Hyperlipidemia    Stage 3a chronic kidney disease    Osteoarthritis of multiple joints    Mixed conductive and sensorineural hearing loss of both ears    Atypical chest pain, onset 2007    History of left-sided carotid endarterectomy    Dizziness, onset 1985    Bruit of right carotid artery    Gouty tophi    Statin intolerance    Generalized body aches, onset 2010    Cognitive dysfunction    Personal history of noncompliance with medical treatment, presenting hazards to health    Prerenal azotemia    Bradycardia    Chronic pain syndrome    Neuropathy    Burning sensation of lower extremity    Frail elderly    Idiopathic peripheral neuropathy    Aortic heart murmur    Impaired functional mobility, balance, gait, and endurance    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 Greater than 50% of the time was spent in counseling and coordination of care. The above assessment and plan have been discussed at length. Referring provider's note reviewed. Labs and procedure over the last 6 months reviewed. Problem List updated. Asked to bring in all active medications / pills bottles with next visit. Will send note to referring / PCP.

## 2025-07-08 ENCOUNTER — CLINICAL SUPPORT (OUTPATIENT)
Dept: REHABILITATION | Facility: HOSPITAL | Age: OVER 89
End: 2025-07-08
Attending: STUDENT IN AN ORGANIZED HEALTH CARE EDUCATION/TRAINING PROGRAM
Payer: COMMERCIAL

## 2025-07-08 DIAGNOSIS — Z74.09 IMPAIRED FUNCTIONAL MOBILITY, BALANCE, GAIT, AND ENDURANCE: Primary | ICD-10-CM

## 2025-07-08 DIAGNOSIS — R26.89 POOR BALANCE: ICD-10-CM

## 2025-07-08 DIAGNOSIS — R54 FRAIL ELDERLY: ICD-10-CM

## 2025-07-08 PROCEDURE — 97530 THERAPEUTIC ACTIVITIES: CPT | Mod: PN

## 2025-07-08 PROCEDURE — 97161 PT EVAL LOW COMPLEX 20 MIN: CPT | Mod: PN

## 2025-07-08 PROCEDURE — 97140 MANUAL THERAPY 1/> REGIONS: CPT | Mod: PN

## 2025-07-10 NOTE — PROGRESS NOTES
Outpatient Rehab    Physical Therapy Evaluation    Patient Name: Yisel العلي  MRN: [id number]  YOB: 1933  Encounter Date: 7/8/2025    Therapy Diagnosis:   Encounter Diagnoses   Name Primary?    Poor balance, onset 2014     Frail elderly     Impaired functional mobility, balance, gait, and endurance Yes     Physician: Amy Edge MD    Physician Orders: Eval and Treat  Medical Diagnosis: Poor balance  Frail elderly  Surgical Diagnosis: Not applicable for this Episode   Surgical Date: Not applicable for this Episode  Days Since Last Surgery: Not applicable for this Episode    Visit # / Visits Authorized:  1 / 1  Insurance Authorization Period: 6/17/2025 to 12/31/2025  Date of Evaluation: 7/8/2025  Plan of Care Certification: 7/8/2025 to 10/8/2025     Time In: 1500   Time Out: 1600  Total Time (in minutes): 60   Total Billable Time (in minutes): 60    Intake Outcome Measure for FOTO Survey    Therapist reviewed FOTO scores for Yisel العلي on 7/8/2025.   FOTO report - see Media section or FOTO account episode details.     Intake Score: 32%    Precautions:       Subjective   History of Present Illness  Yisel is a 89 y.o. female who reports to physical therapy with a chief concern of My dizziness has been consistently bad for the last month; It affects me when I'm sitting, and going from sitting to standing too;.     The patient reports a medical diagnosis of R26.89 (ICD-10-CM) - Poor balance  R54 (ICD-10-CM) - Frail elderly. The patient has experienced this issue since 06/17/25.   Diagnostic tests related to this condition: None.        Dominant Hand: Right  History of Present Condition/Illness: Yisel stated that she asked to come back to therapy because we have helped her in the past.  She reports that she has become very sedentary, sleeps a lot more than she used too - usually takes naps 3x/day in the bed in addition to sleeping at night.  Has nocturnal dysuria - up every 2 hours to  "urinate, but stated that she usually goes right back to sleep.  She still gets out of the house to play bridge 1-2x/week, Tries to walk in the house, but knows she doesn't do enough; Has forgotten the exercises she was supposed to be performing, and wants another copy.  Also remembers that I gave her a soft cervical collar last time, but can't remember who to put in on and what it was for. Yisel stated that her dizziness has increased at times - had a bad month; but reports not falls.  She is now having the dizziness when sitting, standing, not necessarily with movement.  She is managing the slight dizziness she gets when turning in bed.  Yisel describes the dizziness as "swimming" in her head. Yisel does have history of Meniere disease, and hearing loss.  Also has cervical disc disease/spondylosis and stenosis - felt this was contributing to dizziness when patient was seen in clinic a year ago.  I feel dizzy all of the time.  Turning to the left in bed makes me really dizzy, I just wait until is passes. Stated that she just takes her time. Able to get in/out of the car and manage her RW by herself; Has history of BLE neuropathy; chronic cervical/lumbar spondylosis with severe DDD.       Activities of Daily Living  Social history was obtained from Patient.    General Prior Level of Function Comments: Sedentary, walks with RW all of the time; Balance deficits,  General Current Level of Function Comments: Sedentary, sleeping more during the day; legs feel weak; dizziness, but remains indep with her ADL's  Patient Responsibilities: Community mobility, Driving, Personal ADL, Meal prep    Previously independent with activities of daily living? Yes     Currently independent with activities of daily living? Yes     Daughter is living with her at this time - having her house worked on.  Patient reports her daughter helps her with most things - just set-up, meals sometimes; patient reports she is able to stand and prepare a " sandwich; stands to shower - does not have grab bars in shower, or seat.     Previously independent with instrumental activities of daily living? No  Activities previously needing assistance include: Care of others, Financial management, Grocery/shopping, Home establishment and management, and Safety and emergency maintenance.   Currently independent with instrumental activities of daily living? No  Activities currently needing assistance include: Care of others, Financial management, Grocery/shopping, Safety and emergency maintenance, Home establishment and management, and Meal prep.            Pain  No Pain Reported: Yes       Location: patient does have some knee pain with standing too long, but just mentioned in passing.  Clinical Progression (since onset): Stable         Living Arrangements  Living Situation  Housing: Home independently  Living Arrangements: Alone, Other (Comment)  Other Living Arrangements Comment: daughter currently living with her - temporary  Support Systems: Children    Home Setup  Type of Structure: House  Home Access: Level entry  Number of Levels in Home: One level          Past Medical History/Physical Systems Review:   Yisel العلي  has a past medical history of Anticoagulant long-term use, Arthritis, CAD (coronary artery disease), Cancer, Cataract, CKD (chronic kidney disease), stage III, HEARING LOSS, Hematoma complicating a procedure, [place] (hard of hearing), Hyperlipidemia, Hypertension, Meniere disease, Pneumonia, Retinal detachment, Vertigo, and Wears glasses.    Yisel العلي  has a past surgical history that includes Coronary stent placement; carotid surgery; Hysterectomy; Cataract extraction; Retinal detachment surgery; Partial nephrectomy (1013); Knee surgery (Right); and Epidural steroid injection (N/A, 5/4/2022).    Yisel has a current medication list which includes the following prescription(s): allopurinol, cholecalciferol (vitamin d3), colchicine, ergocalciferol,  "lisinopril, and mupirocin.    Review of patient's allergies indicates:   Allergen Reactions    Codeine Other (See Comments)     dosent remember reaction;maybe nausea        Objective   Posture                 Forward head, rounded shoulders, stands with wide NOLBERTO;     Range of Motion:   UEs     WFL  Les      WFL      Upper Extremity Strength  RUE     WFL  LUE     WFL   functional      Lower Extremity Strength  RLE     Grossly tested 3+/5 at hips; 4-/5 at knees and 3+/5 at ankles   LLE      Grossly tested 3+/5 at hips; 4-/5 at knees and 3+/5 at ankles      Functional mobility:     Gait: Patient ambulating with 3-wheeled RW AD with no assistance for 150 feet: Wide NOLBERTO; toes out, slow pace with use of her RW; choppy steps but stable on tile surfaces and out in parking lot - asphalt surface; able to negotiate small incline ramp - just slows her speed; Cannot vary her walking speed - feels very uncomfortable to increase speed.   Turns: extra steps on turn around     Transfers:               Sit to stand:     X 5 in  24.70 secs - stabilization of back of legs on mat with standing noted               TU sec with use of RW                Squat pivot:     Mod I              Stand pivot:     Mod I              Supine to sit:   CGA today      Stairs:  Not tested; Did have Yisel step up and down on 4" step - able to do so with bilateral handrail assist.      Vestibular: did check for BPPV - negative in ant/post and lateral canals: patient does have c/o dizziness with left head turn, but does not have any nystagmus with testing.  This is unchanged from exam a year ago.      Sensation: impaired to light touch Bilateral lower legs from ankles into feet.      Treatment:  Manual Therapy  MT 1: Patient in supine with wedge under head/thoracic area for support - patient unable to lie flat; Sub-occipital inhibition to address forward head, cervical dizziness as result of stenosis; Lift OA flexion/extension; lateral gliding to " "improve rotation. Patient with immediate relief of her symptoms.  Therapeutic Activity  TA 1: Seated: cervical retraction - 5" hold x 10  TA 2: Seated Postural ex for scapular retraction/shoulder ER with yellow tband x 10  TA 3: Ball squeezes for hip adduction in sitting x 20  TA 4: Seated clams with red tband x 20  TA 5: Seated LAQ's x 20  TA 6: Seated marching x 20  TA 7: Seated: bilateral UE flexion - hands clapsed x 10  TA 8: Seated bicep curls/tricep extension x 10  TA 9: Sti to stand x 5 - 2 sets.    Time Entry(in minutes):  PT Evaluation (Low) Time Entry: 30  Manual Therapy Time Entry: 15  Therapeutic Activity Time Entry: 15    Assessment & Plan   Assessment  Yisel presents with a condition of Low complexity.   Presentation of Symptoms: Stable  Will Comorbidities Impact Care: No       Functional Limitations: Activity tolerance, Ambulating on uneven surfaces, Completing self-care activities, Decreased ambulation distance/endurance, Fine motor coordination, Functional mobility, Gait limitations, Increased risk of fall, Maintaining balance, Reaching, Standing tolerance, Visual impairments  Impairments: Abnormal gait, Activity intolerance, Impaired balance, Impaired cognition, Impaired physical strength, Lack of appropriate home exercise program    Patient Goal for Therapy (PT): Reduce dizziness and improve balance and strength  Prognosis: Good  Assessment Details: Yisel is a 89 y.o. female referred to outpatient Physical Therapy with a medical diagnosis of unsteady gait while walking. Patient presents with c/o dizziness, impaired functional mobility, balance, gait and endurance.  Reports that she has become even more sedentary than when she was here a year ago.  She is now having "dizziness" with sitting, standing - all at random times. Still has issues when rolling over in bed, but has this under control and it is not bad.  Has history of fib, BP problems, Cervical stenosis that all contribute to her symptoms.  " "  Will benefit from Skilled physical therapy intervention to address her deficits noted above.        Plan  From a physical therapy perspective, the patient would benefit from: Skilled Rehab Services    Planned therapy interventions include: Therapeutic exercise, Therapeutic activities, Neuromuscular re-education, Manual therapy, and Gait training.            Visit Frequency: 1 times Per Week for 6 Weeks.       This plan was discussed with Patient.   Discussion participants: Agreed Upon Plan of Care             The patient's spiritual, cultural, and educational needs were considered, and the patient is agreeable to the plan of care and goals.     Education  Education was done with Patient. The patient's learning style includes Demonstration, Listening, and Pictures/video. The patient Demonstrates understanding and Verbalizes understanding.         Education given on HEP to be performed on daily basis; Educated patient on how to wear cervical soft collar to support neck to reduce her dizziness complaints. Emphasized need to keep appointments in order to offer patient the opportunity to improve since she is only coming 1x/week        Goals:   Active       LTG's        Jethro to perform all transfers without LOB       Start:  07/08/25    Expected End:  09/05/25            Able to ambulate with use of RW at community required distances without symptoms.        Start:  07/08/25    Expected End:  09/05/25            Improvement in standing tolerance to > 5 mins        Start:  07/08/25    Expected End:  09/05/25            Reduction in "dizziness" noted on a weekly basis        Start:  07/08/25    Expected End:  09/05/25            Independent with HEP for continued improvement in mobility        Start:  07/08/25    Expected End:  09/05/25               STG's        Demonstrate improvement in recent symptoms to progress toward long term goals        Start:  07/08/25    Expected End:  08/08/25            Improve postural " deficits to reduce symptoms        Start:  07/08/25    Expected End:  08/08/25            Compliant with initial exercise program        Start:  07/08/25    Expected End:  08/08/25                Sandee Mujica PT

## 2025-07-29 ENCOUNTER — CLINICAL SUPPORT (OUTPATIENT)
Dept: REHABILITATION | Facility: HOSPITAL | Age: OVER 89
End: 2025-07-29
Payer: COMMERCIAL

## 2025-07-29 DIAGNOSIS — Z74.09 IMPAIRED FUNCTIONAL MOBILITY, BALANCE, GAIT, AND ENDURANCE: Primary | ICD-10-CM

## 2025-07-29 PROCEDURE — 97140 MANUAL THERAPY 1/> REGIONS: CPT | Mod: PN

## 2025-07-29 PROCEDURE — 97530 THERAPEUTIC ACTIVITIES: CPT | Mod: PN

## 2025-07-30 NOTE — PROGRESS NOTES
"  Outpatient Rehab    Physical Therapy Visit    Patient Name: Yisel العلي  MRN: [id number]  YOB: 1933  Encounter Date: 7/29/2025    Therapy Diagnosis:   Encounter Diagnosis   Name Primary?    Impaired functional mobility, balance, gait, and endurance Yes     Physician: Amy Edge MD    Physician Orders: Eval and Treat  Medical Diagnosis: Poor balance  Frail elderly  Surgical Diagnosis: Not applicable for this Episode   Surgical Date: Not applicable for this Episode  Days Since Last Surgery: Not applicable for this Episode    Visit # / Visits Authorized:  1 / 12  Insurance Authorization Period: 7/17/2025 to 12/31/2025  Date of Evaluation: 7/8/2025  Plan of Care Certification: 7/8/2025 to 10/8/2025      PT/PTA: PT   Number of PTA visits since last PT visit:0  Time In: 1300   Time Out: 1350  Total Time (in minutes): 50   Total Billable Time (in minutes): 45 - reduced tolerance     FOTO:  Intake Score (%): 32  Survey Score 2 (%): Not applicable for this Episode  Survey Score 3 (%): Not applicable for this Episode    Precautions:         Subjective   I just wasn't up to coming last week, I have just been sleeping alot during the day - seems like every two hours I fall asleep; "Why am I falling asleep so much?".  Asked patient several questions about activity - stated that she has lots too do, but just keeps falling asleep during the day then sleeps at night - getting up every couple of hours to use the bathroom.  Continues to c/o dizziness - limits her standing tolerance.  Did not perform any of the HEP exercises given on evaluation..  Pain reported as 2/10. neck and back a little sore    Objective            Treatment:  Manual Therapy  MT 1: Patient in supine with wedge under head/thoracic area for support - patient unable to lie flat; Sub-occipital inhibition to address forward head, cervical dizziness as result of stenosis; Lift OA flexion/extension; lateral gliding to improve rotation. " "Patient with immediate relief of her symptoms.  Therapeutic Activity  TA 1: Seated: cervical retraction - 5" hold x 10  TA 2: Seated Postural ex for scapular retraction/shoulder ER with yellow tband x 10  TA 3: Ball squeezes for hip adduction in sitting x 20  TA 4: Seated clams with red tband x 20  TA 5: Seated LAQ's x 20  TA 6: Seated marching x 20  TA 7: Seated: bilateral UE flexion - hands clapsed x 10  TA 8: Seated bicep curls/tricep extension x 10  TA 9: Sti to stand x 5 - 2 sets.    Time Entry(in minutes):  Manual Therapy Time Entry: 10  Therapeutic Activity Time Entry: 35    Assessment & Plan   Assessment: Patient with dizziness with left head rotation in supine with HOB elevated; less dizziness with right head rotation.  Seated independent with balance; dizziness (briefly) upon standing; Patient had ace wrap around her left knee - stated she was trying to protect an "sore" from opening up.  Looked at area - possible inset bite that has become infects - large, has head on it - obvious pus underneath.  Advised patient to contact PCP for an appointment - needs to be sure it doesn't get infected.  Applied bandaid to cover.    Evaluation/Treatment Tolerance: Patient tolerated treatment well    The patient will continue to benefit from skilled outpatient physical therapy in order to address the deficits listed in the problem list on the initial evaluation, provide patient and family education, and maximize the patients level of independence in the home and community environments.     The patient's spiritual, cultural, and educational needs were considered, and the patient is agreeable to the plan of care and goals.           Plan: Continue with treatment 1x/week; reviewed all HEP - patient stated that she still has copy at home.    Goals:   Active       LTG's        Jethro to perform all transfers without LOB       Start:  07/08/25    Expected End:  09/05/25            Able to ambulate with use of RW at community " "required distances without symptoms.        Start:  07/08/25    Expected End:  09/05/25            Improvement in standing tolerance to > 5 mins        Start:  07/08/25    Expected End:  09/05/25            Reduction in "dizziness" noted on a weekly basis        Start:  07/08/25    Expected End:  09/05/25            Independent with HEP for continued improvement in mobility        Start:  07/08/25    Expected End:  09/05/25               STG's        Demonstrate improvement in recent symptoms to progress toward long term goals  (Ongoing)       Start:  07/08/25    Expected End:  08/08/25            Improve postural deficits to reduce symptoms  (Ongoing)       Start:  07/08/25    Expected End:  08/08/25            Compliant with initial exercise program  (Ongoing)       Start:  07/08/25    Expected End:  08/08/25                Sandee Mujica PT    "

## 2025-08-20 ENCOUNTER — OFFICE VISIT (OUTPATIENT)
Dept: FAMILY MEDICINE | Facility: CLINIC | Age: OVER 89
End: 2025-08-20
Payer: COMMERCIAL

## 2025-08-20 VITALS
RESPIRATION RATE: 16 BRPM | HEART RATE: 58 BPM | DIASTOLIC BLOOD PRESSURE: 62 MMHG | WEIGHT: 178 LBS | HEIGHT: 64 IN | OXYGEN SATURATION: 98 % | SYSTOLIC BLOOD PRESSURE: 110 MMHG | BODY MASS INDEX: 30.39 KG/M2

## 2025-08-20 DIAGNOSIS — R26.89 POOR BALANCE: ICD-10-CM

## 2025-08-20 DIAGNOSIS — L08.9 INFECTED SKIN LESION: Primary | ICD-10-CM

## 2025-08-20 DIAGNOSIS — R54 FRAIL ELDERLY: ICD-10-CM

## 2025-08-20 DIAGNOSIS — F09 COGNITIVE DYSFUNCTION: ICD-10-CM

## 2025-08-20 DIAGNOSIS — L98.9 SKIN LESION OF LEFT LOWER EXTREMITY: ICD-10-CM

## 2025-08-20 PROCEDURE — 99213 OFFICE O/P EST LOW 20 MIN: CPT | Mod: S$GLB,,,

## 2025-08-20 PROCEDURE — 99999 PR PBB SHADOW E&M-EST. PATIENT-LVL IV: CPT | Mod: PBBFAC,,,

## 2025-08-20 RX ORDER — MUPIROCIN 20 MG/G
OINTMENT TOPICAL 3 TIMES DAILY
Qty: 1 G | Refills: 0 | Status: SHIPPED | OUTPATIENT
Start: 2025-08-20

## 2025-08-20 RX ORDER — DOXYCYCLINE 100 MG/1
100 CAPSULE ORAL EVERY 12 HOURS
Qty: 20 CAPSULE | Refills: 0 | Status: SHIPPED | OUTPATIENT
Start: 2025-08-20

## 2025-08-29 ENCOUNTER — TELEPHONE (OUTPATIENT)
Dept: FAMILY MEDICINE | Facility: CLINIC | Age: OVER 89
End: 2025-08-29
Payer: COMMERCIAL

## (undated) DEVICE — GLOVE SURG ULTRA TOUCH 7.5

## (undated) DEVICE — NDL HYPODERMIC BLUNT 18G 1.5IN

## (undated) DEVICE — STRAP OR TABLE 5IN X 72IN

## (undated) DEVICE — KIT NERVE BLOCK PREP BAPTIST

## (undated) DEVICE — NDL TUOHY EPIDRL PNK 18GX3.5IN

## (undated) DEVICE — PAD PREPS ALCOHOL 2-PLY LARGE

## (undated) DEVICE — SYR LUER-LOCK STERILE 3ML

## (undated) DEVICE — NDL 10CC 20GAX1 COMBO

## (undated) DEVICE — MARKER SKIN STND TIP BLUE BARR

## (undated) DEVICE — APPLICATOR CHLORAPREP CLR 10.5

## (undated) DEVICE — SYR GLASS 5CC LUER LOK